# Patient Record
Sex: FEMALE | Race: WHITE | NOT HISPANIC OR LATINO | Employment: FULL TIME | URBAN - METROPOLITAN AREA
[De-identification: names, ages, dates, MRNs, and addresses within clinical notes are randomized per-mention and may not be internally consistent; named-entity substitution may affect disease eponyms.]

---

## 2017-01-13 ENCOUNTER — APPOINTMENT (OUTPATIENT)
Dept: LAB | Facility: HOSPITAL | Age: 52
End: 2017-01-13

## 2017-01-13 ENCOUNTER — CONSULT (OUTPATIENT)
Dept: BARIATRICS/WEIGHT MGMT | Facility: CLINIC | Age: 52
End: 2017-01-13

## 2017-01-13 VITALS
WEIGHT: 263 LBS | SYSTOLIC BLOOD PRESSURE: 132 MMHG | HEIGHT: 65 IN | DIASTOLIC BLOOD PRESSURE: 83 MMHG | TEMPERATURE: 98.3 F | HEART RATE: 69 BPM | RESPIRATION RATE: 16 BRPM | BODY MASS INDEX: 43.82 KG/M2

## 2017-01-13 DIAGNOSIS — E66.01 OBESITY, CLASS III, BMI 40-49.9 (MORBID OBESITY) (HCC): Primary | ICD-10-CM

## 2017-01-13 DIAGNOSIS — G47.33 OSA (OBSTRUCTIVE SLEEP APNEA): ICD-10-CM

## 2017-01-13 DIAGNOSIS — M25.50 MULTIPLE JOINT PAIN: ICD-10-CM

## 2017-01-13 DIAGNOSIS — K21.9 GASTROESOPHAGEAL REFLUX DISEASE, ESOPHAGITIS PRESENCE NOT SPECIFIED: ICD-10-CM

## 2017-01-13 DIAGNOSIS — E78.5 HYPERLIPIDEMIA, UNSPECIFIED HYPERLIPIDEMIA TYPE: ICD-10-CM

## 2017-01-13 DIAGNOSIS — I10 ESSENTIAL HYPERTENSION: ICD-10-CM

## 2017-01-13 DIAGNOSIS — R53.82 CHRONIC FATIGUE: ICD-10-CM

## 2017-01-13 DIAGNOSIS — R60.9 EDEMA, UNSPECIFIED TYPE: ICD-10-CM

## 2017-01-13 DIAGNOSIS — E78.5 DYSLIPIDEMIA: ICD-10-CM

## 2017-01-13 PROBLEM — E66.813 OBESITY, CLASS III, BMI 40-49.9 (MORBID OBESITY): Status: ACTIVE | Noted: 2017-01-13

## 2017-01-13 PROBLEM — M54.9 CHRONIC BACK PAIN: Status: ACTIVE | Noted: 2017-01-13

## 2017-01-13 PROBLEM — J45.909 ASTHMA: Status: ACTIVE | Noted: 2017-01-13

## 2017-01-13 PROBLEM — G89.29 CHRONIC BACK PAIN: Status: ACTIVE | Noted: 2017-01-13

## 2017-01-13 PROBLEM — K58.9 IBS (IRRITABLE BOWEL SYNDROME): Status: ACTIVE | Noted: 2017-01-13

## 2017-01-13 PROBLEM — I50.9 CONGESTIVE HEART FAILURE: Status: ACTIVE | Noted: 2017-01-13

## 2017-01-13 PROBLEM — F32.A DEPRESSION: Status: ACTIVE | Noted: 2017-01-13

## 2017-01-13 PROBLEM — N28.9 RENAL INSUFFICIENCY: Status: ACTIVE | Noted: 2017-01-13

## 2017-01-13 LAB
ALBUMIN SERPL-MCNC: 4.1 G/DL (ref 3.5–5.2)
ALBUMIN/GLOB SERPL: 1.1 G/DL
ALP SERPL-CCNC: 139 U/L (ref 39–117)
ALT SERPL W P-5'-P-CCNC: 27 U/L (ref 1–33)
ANION GAP SERPL CALCULATED.3IONS-SCNC: 13.9 MMOL/L
AST SERPL-CCNC: 20 U/L (ref 1–32)
BASOPHILS # BLD AUTO: 0.03 10*3/MM3 (ref 0–0.2)
BASOPHILS NFR BLD AUTO: 0.3 % (ref 0–1.5)
BILIRUB SERPL-MCNC: 0.4 MG/DL (ref 0.1–1.2)
BUN BLD-MCNC: 12 MG/DL (ref 6–20)
BUN/CREAT SERPL: 10.7 (ref 7–25)
CALCIUM SPEC-SCNC: 9.6 MG/DL (ref 8.6–10.5)
CHLORIDE SERPL-SCNC: 101 MMOL/L (ref 98–107)
CHOLEST SERPL-MCNC: 146 MG/DL (ref 0–200)
CO2 SERPL-SCNC: 28.1 MMOL/L (ref 22–29)
CREAT BLD-MCNC: 1.12 MG/DL (ref 0.57–1)
DEPRECATED RDW RBC AUTO: 50.4 FL (ref 37–54)
EOSINOPHIL # BLD AUTO: 0.3 10*3/MM3 (ref 0–0.7)
EOSINOPHIL NFR BLD AUTO: 3.5 % (ref 0.3–6.2)
ERYTHROCYTE [DISTWIDTH] IN BLOOD BY AUTOMATED COUNT: 16 % (ref 11.7–13)
GFR SERPL CREATININE-BSD FRML MDRD: 51 ML/MIN/1.73
GLOBULIN UR ELPH-MCNC: 3.9 GM/DL
GLUCOSE BLD-MCNC: 114 MG/DL (ref 65–99)
HBA1C MFR BLD: 5.72 % (ref 4.8–5.6)
HCT VFR BLD AUTO: 41.8 % (ref 35.6–45.5)
HDLC SERPL-MCNC: 40 MG/DL (ref 40–60)
HGB BLD-MCNC: 12.9 G/DL (ref 11.9–15.5)
IMM GRANULOCYTES # BLD: 0 10*3/MM3 (ref 0–0.03)
IMM GRANULOCYTES NFR BLD: 0 % (ref 0–0.5)
LDLC SERPL CALC-MCNC: 65 MG/DL (ref 0–100)
LDLC/HDLC SERPL: 1.64 {RATIO}
LYMPHOCYTES # BLD AUTO: 2.37 10*3/MM3 (ref 0.9–4.8)
LYMPHOCYTES NFR BLD AUTO: 27.6 % (ref 19.6–45.3)
MCH RBC QN AUTO: 26.4 PG (ref 26.9–32)
MCHC RBC AUTO-ENTMCNC: 30.9 G/DL (ref 32.4–36.3)
MCV RBC AUTO: 85.7 FL (ref 80.5–98.2)
MONOCYTES # BLD AUTO: 0.37 10*3/MM3 (ref 0.2–1.2)
MONOCYTES NFR BLD AUTO: 4.3 % (ref 5–12)
NEUTROPHILS # BLD AUTO: 5.53 10*3/MM3 (ref 1.9–8.1)
NEUTROPHILS NFR BLD AUTO: 64.3 % (ref 42.7–76)
PLATELET # BLD AUTO: 247 10*3/MM3 (ref 140–500)
PMV BLD AUTO: 10 FL (ref 6–12)
POTASSIUM BLD-SCNC: 3.7 MMOL/L (ref 3.5–5.2)
PROT SERPL-MCNC: 8 G/DL (ref 6–8.5)
RBC # BLD AUTO: 4.88 10*6/MM3 (ref 3.9–5.2)
SODIUM BLD-SCNC: 143 MMOL/L (ref 136–145)
TRIGL SERPL-MCNC: 203 MG/DL (ref 0–150)
TSH SERPL DL<=0.05 MIU/L-ACNC: 2.14 MIU/ML (ref 0.27–4.2)
VLDLC SERPL-MCNC: 40.6 MG/DL (ref 5–40)
WBC NRBC COR # BLD: 8.6 10*3/MM3 (ref 4.5–10.7)

## 2017-01-13 PROCEDURE — 99205 OFFICE O/P NEW HI 60 MIN: CPT | Performed by: NURSE PRACTITIONER

## 2017-01-13 PROCEDURE — 80061 LIPID PANEL: CPT | Performed by: NURSE PRACTITIONER

## 2017-01-13 PROCEDURE — 85025 COMPLETE CBC W/AUTO DIFF WBC: CPT | Performed by: NURSE PRACTITIONER

## 2017-01-13 PROCEDURE — 80053 COMPREHEN METABOLIC PANEL: CPT | Performed by: NURSE PRACTITIONER

## 2017-01-13 PROCEDURE — 84443 ASSAY THYROID STIM HORMONE: CPT | Performed by: NURSE PRACTITIONER

## 2017-01-13 PROCEDURE — 83036 HEMOGLOBIN GLYCOSYLATED A1C: CPT | Performed by: NURSE PRACTITIONER

## 2017-01-13 PROCEDURE — 36415 COLL VENOUS BLD VENIPUNCTURE: CPT | Performed by: NURSE PRACTITIONER

## 2017-01-13 RX ORDER — GABAPENTIN 300 MG/1
300 CAPSULE ORAL 2 TIMES DAILY
COMMUNITY
End: 2019-07-01

## 2017-01-13 NOTE — MR AVS SNAPSHOT
Dangdra Hunt   2017 1:05 PM   Consult    Dept Phone:  [phone]   Encounter #:  [id number]    Provider:  ROSHNI Hanson   Department:  [facility] BARIATRIC SURGERY                Your Full Care Plan              Your Updated Medication List          This list is accurate as of: 17  1:50 PM.  Always use your most recent med list.                albuterol (2.5 MG/3ML) 0.083% nebulizer solution   Commonly known as:  PROVENTIL       atorvastatin 20 MG tablet   Commonly known as:  LIPITOR       estradiol 0.025 MG/24HR patch   Commonly known as:  CLIMARA       FLUoxetine 20 MG capsule   Commonly known as:  PROzac       fluticasone-salmeterol 500-50 MCG/DOSE DISKUS   Commonly known as:  ADVAIR       furosemide 40 MG tablet   Commonly known as:  LASIX       loratadine 10 MG tablet   Commonly known as:  CLARITIN       metoprolol tartrate 25 MG tablet   Commonly known as:  LOPRESSOR       montelukast 10 MG tablet   Commonly known as:  SINGULAIR       omeprazole 40 MG capsule   Commonly known as:  priLOSEC       valsartan 80 MG tablet   Commonly known as:  DIOVAN               Instructions     None    Patient Instructions History      Upcoming Appointments     Visit Type Date Time Department    CONSULT 2017  1:05 PM [facility]    LAB 2017  8:20 AM  [facility]      Fashionchick Signup     [facility] Fashionchick allows you to send messages to your doctor, view your test results, renew your prescriptions, schedule appointments, and more. To sign up, go to [employer] and click on the Sign Up Now link in the New User? box. Enter your Fashionchick Activation Code exactly as it appears below along with the last four digits of your Social Security Number and your Date of Birth () to complete the sign-up process. If you do not sign up before the expiration date, you must request a new code.    Fashionchick Activation Code: [email or web address]  Expires:  "1/27/2017  1:50 PM    If you have questions, you can email [email or web address] or call [phone] to talk to our MyChart staff. Remember, Advanced Patient Carehart is NOT to be used for urgent needs. For medical emergencies, dial 911.               Other Info from Your Visit           Allergies     No Known Allergies      Reason for Visit     Obesity Weight gain; unable to maintain weigh tloss      Vital Signs     Blood Pressure Pulse Temperature Respirations Height Weight    132/83 69 98.3 °F (36.8 °C) (Oral) 16 64.5\" (163.8 cm) 263 lb (119 kg)    Body Mass Index Smoking Status                44.45 kg/m2 Former Smoker            "

## 2017-01-13 NOTE — PATIENT INSTRUCTIONS
Recommended patient be sure to get at least 70 grams of protein per day by eating small, frequent meals all with high lean protein choices. Be sure to limit/cut back on daily carbohydrate intake. Discussed with the patient the recommended amount of water per day to intake. Reviewed vitamin requirements. Be sure to do routine exercise including both cardio and strength training.

## 2017-01-13 NOTE — PROGRESS NOTES
[facility] BARIATRIC SURGERY  [address]  [facility] [address]  [address]  [facility] [address]  Dept: [phone]  1/13/2017      Dang Hunt.  [id number]  [id number]  1965  female      Chief Complaint of weight gain; unable to maintain weight loss    History of Present Illness:   Dang is a 51 y.o. female who presents today for evaluation, education and consultation regarding weight loss surgery. The patient is interested in the sleeve gastrectomy.      Diet History:Dang has been overweight for at least 20 years, has been 35 pounds or more overweight for at least 20 years, has been 100 pounds or more overweight for 10 or more years and started dieting at age 31.  The most weight Dang lost was 30 pounds on fasting and maintained the weight loss for 6 months. Dang describes her eating habits as snacker. Dang Hunt has tried Atkins, Weight Watchers, Fasting, reduced calorie and prescription medications among others with success of losing up to 30 pounds, but in each instance regained the weight.     See dietician documentation for complete history.    Bariatric Surgery Evaluation: The patient is being seen for an initial visit for bariatric surgery evaluation.     Bariatric Co-morbidities:  sleep apnea, hypertension, dyslipidemia, back pain, GERD, asthma, edema and depression    Patient Active Problem List   Diagnosis   • Degeneration of intervertebral disc of lumbar region   • Chronic back pain   • Obesity, Class III, BMI 40-49.9 (morbid obesity)   • MANI (obstructive sleep apnea)   • GERD (gastroesophageal reflux disease)   • Hyperlipidemia   • Essential hypertension   • Chronic fatigue   • Edema   • Asthma   • IBS (irritable bowel syndrome)   • Renal insufficiency   • Multiple joint pain   • Depression   • Congestive heart failure       Past Medical History   Diagnosis Date   • Acute pancreatitis    • Colitis    • Depression     • Diarrhea    • Dysphagia    • Edema    • Fatigue    • GERD (gastroesophageal reflux disease)    • Heartburn    • HTN (hypertension)    • Hyperlipidemia    • Multiple joint pain    • MANI (obstructive sleep apnea)    • RLS (restless legs syndrome)        Past Surgical History   Procedure Laterality Date   • Hysterectomy     • Colonoscopy     • Back surgery     • Lung biopsy     • Laparoscopic cholecystectomy     • Oophorectomy     • Breast biopsy     • Neck surgery     • Bladder suspension         No Known Allergies      Current Outpatient Prescriptions:   •  albuterol (PROVENTIL) (2.5 MG/3ML) 0.083% nebulizer solution, Take 2.5 mg by nebulization Every 4 (Four) Hours As Needed for wheezing., Disp: , Rfl:   •  atorvastatin (LIPITOR) 20 MG tablet, Take 20 mg by mouth Daily., Disp: , Rfl:   •  estradiol (CLIMARA) 0.025 MG/24HR patch, Place 1 patch on the skin 1 (One) Time Per Week., Disp: , Rfl:   •  FLUoxetine (PROzac) 20 MG capsule, Take 20 mg by mouth Daily., Disp: , Rfl:   •  fluticasone-salmeterol (ADVAIR) 500-50 MCG/DOSE DISKUS, Inhale 2 (Two) Times a Day., Disp: , Rfl:   •  furosemide (LASIX) 40 MG tablet, Take 40 mg by mouth 2 (Two) Times a Day., Disp: , Rfl:   •  gabapentin (NEURONTIN) 300 MG capsule, Take 300 mg by mouth 2 (Two) Times a Day., Disp: , Rfl:   •  loratadine (CLARITIN) 10 MG tablet, Take 10 mg by mouth Daily., Disp: , Rfl:   •  metoprolol tartrate (LOPRESSOR) 25 MG tablet, Take 25 mg by mouth 2 (Two) Times a Day., Disp: , Rfl:   •  montelukast (SINGULAIR) 10 MG tablet, Take 10 mg by mouth Every Night., Disp: , Rfl:   •  Omega-3 Fatty Acids (FISH OIL BURP-LESS PO), Take 1 tablet by mouth Daily., Disp: , Rfl:   •  omeprazole (priLOSEC) 40 MG capsule, Take 40 mg by mouth Daily., Disp: , Rfl:   •  valsartan (DIOVAN) 80 MG tablet, Take 80 mg by mouth Daily., Disp: , Rfl:     Social History     Social History   • Marital status:      Spouse name: N/A   • Number of children: 2   • Years of  education: N/A     Occupational History   • Not on file.     Social History Main Topics   • Smoking status: Former Smoker   • Smokeless tobacco: Not on file   • Alcohol use No   • Drug use: No   • Sexual activity: Defer     Other Topics Concern   • Not on file     Social History Narrative       Family History   Problem Relation Age of Onset   • Obesity Mother    • Hypertension Mother    • Obesity Sister    • Hypertension Sister    • Obesity Maternal Grandmother    • Hypertension Maternal Grandmother    • Stroke Maternal Grandmother    • Heart attack Maternal Grandmother          Review of Systems:  Review of Systems   All other systems reviewed and are negative.      Physical Exam:  Vital Signs:  Weight: 263 lb (119 kg)   Body mass index is 44.45 kg/(m^2).  Temp: 98.3 °F (36.8 °C)   Heart Rate: 69   BP: 132/83     Physical Exam   Constitutional: She is oriented to person, place, and time. She appears well-developed and well-nourished.   HENT:   Head: Normocephalic and atraumatic.   Eyes: EOM are normal.   Cardiovascular: Normal rate, regular rhythm and normal heart sounds.    Pulmonary/Chest: Effort normal and breath sounds normal.   Abdominal: Soft. Bowel sounds are normal. She exhibits no distension. There is no tenderness.   Musculoskeletal: Normal range of motion.   Neurological: She is alert and oriented to person, place, and time.   Skin: Skin is warm and dry.   Psychiatric: She has a normal mood and affect. Her behavior is normal. Judgment and thought content normal.   Vitals reviewed.         Assessment:         Dang Hunt is a 51 y.o. year old female with medically complicated severe obesity. Weight: 263 lb (119 kg), Body mass index is 44.45 kg/(m^2). and weight related problems including sleep apnea, hypertension, dyslipidemia, back pain, GERD, asthma, edema and depression.    I explained in detail the procedures that we are performing.  All of those procedures can be performed laparoscopically but there  is a chance to convert to open if any technical challenges or complications do occur.  Bariatric surgery is not cosmetic surgery but rather a tool to help a patient make a life-long commitment lifestyle changes including diet, exercise, behavior changes, and taking supplemental vitamins and minerals.    Due to the patient's BMI and co-morbidities they are at a high risk for surgery and will obtain the following:  The patient has been advised that a letter of medical support and a history and physical must be obtained from her primary care physician. A psychological evaluation will be arranged for this patient. CBC, CMP, FLP, TSH and HgbA1C will be drawn. Dang Hunt will obtain a pre-operative CXR and EKG. Dang Andersonel will obtain clearance from her cardiologist and pulmonologist prior to surgery.     Dang Hunt will be set up for a pre-operative diagnostic esophagogastroduodenoscopy with biopsy for evaluation. The risks and benefits of the procedure were discussed with the patient in detail and all questions were answered.  Possibility of perforation, bleeding, aspiration, anoxic brain injury, respiratory and/or cardiac arrest and death were discussed.   She received handouts regarding, all questions were answered and informed consent was obtained.     The risks, benefits, alternatives, and potential complications of all of the procedures were explained in detail including, but not limited to death, anesthesia and medication adverse effect/DVT, pulmonary embolism, trocar site/incisional hernia, wound infection, abdominal infection, bleeding, failure to lose weight or gain weight and change in body image, metabolic complications with calcium, thiamine, vitamin B12, folate, iron, and anemia.    The patient was advised to start a high protein, low fat and low carbohydrate diet. The patient was given individualized information by our dietician along with general group information and handouts.     If the patient had  the Basal Metabolic Rate test performed in our office today then I reviewed the results with them including changes to help increase metabolism and a recommended daily caloric intake range- see scanned results.    The patient was given information regarding the ANI educational video. ANI is an internet based educational video which explains the surgical procedure and answers basic questions regarding the procedure. The patient was provided with instructions and a password to watch the video.    The patient was encouraged to start routine exercise including but not limited to 150 minutes per week. The patient received a resistance band along with a handout of exercises.     The consultation plan was reviewed with the patient.    The patient understands the surgical procedures and the different surgical options that are available.  She understands the lifestyle changes that would be required after surgery and has agreed to participate in a pre-operative and postoperative weight management program.  She also expressed understanding of possible risks, had several questions answered and desires to proceed.    I think she is a good candidate for this surgery, and is interested in a sleeve gastrectomy.    Plan:    Patient will have evaluations and follow up with bariatric dieticians and a psychologist before undergoing a multidisciplinary review of her candidacy.  We also discussed the weight loss requirement and rationale, and other program requirements.      Radha Douglas, APRN  1/13/2017

## 2017-01-13 NOTE — PROGRESS NOTES
"Bariatric Nutrition Counseling Interview    Patient Name:  Dang Hunt  YOB: 1965  Age:  51 y.o.  Sex:  female  MRN: [id number]  Date:  01/13/17    Procedure Considering:  Sleeve    Last Documented Height:    Ht Readings from Last 1 Encounters:   01/13/17 64.5\" (163.8 cm)     Last Documented Weight:   Wt Readings from Last 1 Encounters:   01/13/17 263 lb (119 kg)      Body mass index is 44.45 kg/(m^2).    Highest Weight:  263 lbs.  Goal Weight: 160 lbs.    History:  Past Medical History   Diagnosis Date   • Depression    • Diarrhea    • Dysphagia    • Edema    • Fatigue    • GERD (gastroesophageal reflux disease)    • Heartburn    • HTN (hypertension)    • Hyperlipidemia    • Multiple joint pain    • MANI (obstructive sleep apnea)    • Pancreatic disease    • RLS (restless legs syndrome)      Past Surgical History   Procedure Laterality Date   • Cholecystectomy     • Hysterectomy     • Colonoscopy     • Back surgery     • Lung biopsy       Family History   Problem Relation Age of Onset   • Obesity Mother    • Hypertension Mother    • Obesity Sister    • Hypertension Sister    • Obesity Maternal Grandmother    • Hypertension Maternal Grandmother    • Stroke Maternal Grandmother    • Heart attack Maternal Grandmother      Social History     Social History   • Marital status:      Spouse name: N/A   • Number of children: N/A   • Years of education: N/A     Social History Main Topics   • Smoking status: Former Smoker   • Smokeless tobacco: Not on file   • Alcohol use No   • Drug use: Not on file   • Sexual activity: Not on file     Other Topics Concern   • Not on file     Social History Narrative   • No narrative on file     Additional Health Issues to Consider:  CHF,COPD,HTN,Migraines,Joint pain    Weight History:  Dang has struggled with weight over the past twenty years. She attributes this to a sedentary lifestyle and poor eating habits. She complains of multiple health issues that have " impacted her weight gain.            Previous Weight Loss Efforts:  Weight Watchers, The Roland diet, Over the counter weight loss aids  Most Successful Weight Loss Effort:  Weight loss efforts have not been helpful in reaching long term weight loss goals    Eating Habits: Eat large portions, snacking  Eat three meals on most days?  Yes  Worst eating habit?  Snacker    How often do you eat fast food? weekly    Do you exercise regularly? (at least 3 times each week)  No    Occupation:  Adult Foster care worker,some  Physical activity required on a regular basis    Personal Goal After Procedure:  To reduce medications and move without Shortness of breath. Dang would like to walk for one one mile regularly.   Personal Support:  Sister, also considering weight loss surgery.       Assessment: We reviewed program requirements for weight loss following surgery.  We discussed personal habits and lifestyle behaviors that may influence weight loss efforts. Program , including a reduced calorie diet,were provided, discussed and recommended as the regimen to follow for pre and post diet planning. Dang demonstrated a good comprehension of diet requirements as well as a commitment to work on personal challenges.  She will make a good candidate for weight loss surgery    Electronically signed by:  Harriett Le RD  01/13/17 1:12 PM

## 2017-03-07 ENCOUNTER — OUTSIDE FACILITY SERVICE (OUTPATIENT)
Dept: BARIATRICS/WEIGHT MGMT | Facility: CLINIC | Age: 52
End: 2017-03-07

## 2017-03-07 PROCEDURE — 43239 EGD BIOPSY SINGLE/MULTIPLE: CPT | Performed by: SURGERY

## 2017-03-08 ENCOUNTER — LAB REQUISITION (OUTPATIENT)
Dept: LAB | Facility: HOSPITAL | Age: 52
End: 2017-03-08

## 2017-03-08 DIAGNOSIS — K21.9 GASTRO-ESOPHAGEAL REFLUX DISEASE WITHOUT ESOPHAGITIS: ICD-10-CM

## 2017-03-08 PROCEDURE — 87081 CULTURE SCREEN ONLY: CPT | Performed by: SURGERY

## 2017-03-23 LAB — UREASE TISS QL: NEGATIVE

## 2017-04-30 ENCOUNTER — PREP FOR SURGERY (OUTPATIENT)
Dept: BARIATRICS/WEIGHT MGMT | Facility: CLINIC | Age: 52
End: 2017-04-30

## 2017-04-30 DIAGNOSIS — E66.01 OBESITY, CLASS III, BMI 40-49.9 (MORBID OBESITY) (HCC): Primary | ICD-10-CM

## 2017-04-30 RX ORDER — SODIUM CHLORIDE 0.9 % (FLUSH) 0.9 %
1-10 SYRINGE (ML) INJECTION AS NEEDED
Status: CANCELLED | OUTPATIENT
Start: 2017-04-30

## 2017-04-30 RX ORDER — CHLORHEXIDINE GLUCONATE 0.12 MG/ML
15 RINSE ORAL SEE ADMIN INSTRUCTIONS
Status: CANCELLED | OUTPATIENT
Start: 2017-04-30

## 2017-04-30 RX ORDER — CEFAZOLIN SODIUM IN 0.9 % NACL 3 G/100 ML
3 INTRAVENOUS SOLUTION, PIGGYBACK (ML) INTRAVENOUS ONCE
Status: CANCELLED | OUTPATIENT
Start: 2017-04-30 | End: 2017-04-30

## 2017-04-30 RX ORDER — PANTOPRAZOLE SODIUM 40 MG/10ML
40 INJECTION, POWDER, LYOPHILIZED, FOR SOLUTION INTRAVENOUS ONCE
Status: CANCELLED | OUTPATIENT
Start: 2017-04-30 | End: 2017-04-30

## 2017-04-30 RX ORDER — SODIUM CHLORIDE, SODIUM LACTATE, POTASSIUM CHLORIDE, CALCIUM CHLORIDE 600; 310; 30; 20 MG/100ML; MG/100ML; MG/100ML; MG/100ML
100 INJECTION, SOLUTION INTRAVENOUS CONTINUOUS
Status: CANCELLED | OUTPATIENT
Start: 2017-04-30

## 2017-04-30 RX ORDER — SCOLOPAMINE TRANSDERMAL SYSTEM 1 MG/1
1 PATCH, EXTENDED RELEASE TRANSDERMAL ONCE
Status: CANCELLED | OUTPATIENT
Start: 2017-04-30 | End: 2017-04-30

## 2017-05-04 ENCOUNTER — CONSULT (OUTPATIENT)
Dept: BARIATRICS/WEIGHT MGMT | Facility: CLINIC | Age: 52
End: 2017-05-04

## 2017-05-04 VITALS
SYSTOLIC BLOOD PRESSURE: 105 MMHG | HEART RATE: 68 BPM | HEIGHT: 65 IN | TEMPERATURE: 98.2 F | WEIGHT: 268.5 LBS | DIASTOLIC BLOOD PRESSURE: 73 MMHG | RESPIRATION RATE: 16 BRPM | BODY MASS INDEX: 44.73 KG/M2

## 2017-05-04 DIAGNOSIS — M25.50 MULTIPLE JOINT PAIN: ICD-10-CM

## 2017-05-04 DIAGNOSIS — E66.01 OBESITY, CLASS III, BMI 40-49.9 (MORBID OBESITY) (HCC): Primary | ICD-10-CM

## 2017-05-04 DIAGNOSIS — G89.29 CHRONIC LOW BACK PAIN, UNSPECIFIED BACK PAIN LATERALITY, WITH SCIATICA PRESENCE UNSPECIFIED: ICD-10-CM

## 2017-05-04 DIAGNOSIS — I50.30 DIASTOLIC CONGESTIVE HEART FAILURE, UNSPECIFIED CONGESTIVE HEART FAILURE CHRONICITY: ICD-10-CM

## 2017-05-04 DIAGNOSIS — K21.9 GASTROESOPHAGEAL REFLUX DISEASE WITHOUT ESOPHAGITIS: ICD-10-CM

## 2017-05-04 DIAGNOSIS — M51.36 DEGENERATION OF INTERVERTEBRAL DISC OF LUMBAR REGION: ICD-10-CM

## 2017-05-04 DIAGNOSIS — I10 ESSENTIAL HYPERTENSION: ICD-10-CM

## 2017-05-04 DIAGNOSIS — R53.82 CHRONIC FATIGUE: ICD-10-CM

## 2017-05-04 DIAGNOSIS — E78.5 HYPERLIPIDEMIA, UNSPECIFIED HYPERLIPIDEMIA TYPE: ICD-10-CM

## 2017-05-04 DIAGNOSIS — J45.20 MILD INTERMITTENT ASTHMA WITHOUT COMPLICATION: ICD-10-CM

## 2017-05-04 DIAGNOSIS — M54.5 CHRONIC LOW BACK PAIN, UNSPECIFIED BACK PAIN LATERALITY, WITH SCIATICA PRESENCE UNSPECIFIED: ICD-10-CM

## 2017-05-04 DIAGNOSIS — N28.9 RENAL INSUFFICIENCY: ICD-10-CM

## 2017-05-04 DIAGNOSIS — G47.33 OSA (OBSTRUCTIVE SLEEP APNEA): ICD-10-CM

## 2017-05-04 PROCEDURE — 99215 OFFICE O/P EST HI 40 MIN: CPT | Performed by: SURGERY

## 2017-05-09 ENCOUNTER — APPOINTMENT (OUTPATIENT)
Dept: PREADMISSION TESTING | Facility: HOSPITAL | Age: 52
End: 2017-05-09

## 2017-05-09 VITALS
TEMPERATURE: 97.4 F | HEART RATE: 76 BPM | SYSTOLIC BLOOD PRESSURE: 105 MMHG | DIASTOLIC BLOOD PRESSURE: 72 MMHG | HEIGHT: 65 IN | RESPIRATION RATE: 20 BRPM | OXYGEN SATURATION: 94 %

## 2017-05-09 DIAGNOSIS — E66.01 OBESITY, CLASS III, BMI 40-49.9 (MORBID OBESITY) (HCC): ICD-10-CM

## 2017-05-09 LAB
ALBUMIN SERPL-MCNC: 4 G/DL (ref 3.5–5.2)
ALBUMIN/GLOB SERPL: 1.1 G/DL
ALP SERPL-CCNC: 111 U/L (ref 39–117)
ALT SERPL W P-5'-P-CCNC: 27 U/L (ref 1–33)
ANION GAP SERPL CALCULATED.3IONS-SCNC: 13.4 MMOL/L
AST SERPL-CCNC: 22 U/L (ref 1–32)
BILIRUB SERPL-MCNC: 0.4 MG/DL (ref 0.1–1.2)
BUN BLD-MCNC: 31 MG/DL (ref 6–20)
BUN/CREAT SERPL: 32.6 (ref 7–25)
CALCIUM SPEC-SCNC: 9.8 MG/DL (ref 8.6–10.5)
CHLORIDE SERPL-SCNC: 100 MMOL/L (ref 98–107)
CO2 SERPL-SCNC: 25.6 MMOL/L (ref 22–29)
CREAT BLD-MCNC: 0.95 MG/DL (ref 0.57–1)
DEPRECATED RDW RBC AUTO: 49.8 FL (ref 37–54)
ERYTHROCYTE [DISTWIDTH] IN BLOOD BY AUTOMATED COUNT: 16 % (ref 11.7–13)
GFR SERPL CREATININE-BSD FRML MDRD: 62 ML/MIN/1.73
GLOBULIN UR ELPH-MCNC: 3.8 GM/DL
GLUCOSE BLD-MCNC: 82 MG/DL (ref 65–99)
HCT VFR BLD AUTO: 41.7 % (ref 35.6–45.5)
HGB BLD-MCNC: 13.1 G/DL (ref 11.9–15.5)
MCH RBC QN AUTO: 27 PG (ref 26.9–32)
MCHC RBC AUTO-ENTMCNC: 31.4 G/DL (ref 32.4–36.3)
MCV RBC AUTO: 85.8 FL (ref 80.5–98.2)
PLATELET # BLD AUTO: 248 10*3/MM3 (ref 140–500)
PMV BLD AUTO: 10.1 FL (ref 6–12)
POTASSIUM BLD-SCNC: 4.2 MMOL/L (ref 3.5–5.2)
PROT SERPL-MCNC: 7.8 G/DL (ref 6–8.5)
RBC # BLD AUTO: 4.86 10*6/MM3 (ref 3.9–5.2)
SODIUM BLD-SCNC: 139 MMOL/L (ref 136–145)
WBC NRBC COR # BLD: 8.73 10*3/MM3 (ref 4.5–10.7)

## 2017-05-09 PROCEDURE — 36415 COLL VENOUS BLD VENIPUNCTURE: CPT

## 2017-05-09 PROCEDURE — 80053 COMPREHEN METABOLIC PANEL: CPT | Performed by: SURGERY

## 2017-05-09 PROCEDURE — 85027 COMPLETE CBC AUTOMATED: CPT | Performed by: SURGERY

## 2017-05-15 ENCOUNTER — HOSPITAL ENCOUNTER (INPATIENT)
Facility: HOSPITAL | Age: 52
LOS: 1 days | Discharge: HOME | End: 2017-05-16
Attending: SURGERY | Admitting: SURGERY

## 2017-05-15 ENCOUNTER — ANESTHESIA EVENT (OUTPATIENT)
Dept: PERIOP | Facility: HOSPITAL | Age: 52
End: 2017-05-15

## 2017-05-15 ENCOUNTER — ANESTHESIA (OUTPATIENT)
Dept: PERIOP | Facility: HOSPITAL | Age: 52
End: 2017-05-15

## 2017-05-15 DIAGNOSIS — E66.01 OBESITY, CLASS III, BMI 40-49.9 (MORBID OBESITY) (HCC): ICD-10-CM

## 2017-05-15 PROCEDURE — 25010000003 CEFAZOLIN IN DEXTROSE 2-4 GM/100ML-% SOLUTION: Performed by: SURGERY

## 2017-05-15 PROCEDURE — 25010000002 ONDANSETRON PER 1 MG: Performed by: NURSE ANESTHETIST, CERTIFIED REGISTERED

## 2017-05-15 PROCEDURE — 94799 UNLISTED PULMONARY SVC/PX: CPT

## 2017-05-15 PROCEDURE — 25010000002 KETOROLAC TROMETHAMINE PER 15 MG: Performed by: SURGERY

## 2017-05-15 PROCEDURE — 25010000002 SUCCINYLCHOLINE PER 20 MG: Performed by: NURSE ANESTHETIST, CERTIFIED REGISTERED

## 2017-05-15 PROCEDURE — 43775 LAP SLEEVE GASTRECTOMY: CPT | Performed by: SURGERY

## 2017-05-15 PROCEDURE — 25010000002 METOCLOPRAMIDE PER 10 MG: Performed by: SURGERY

## 2017-05-15 PROCEDURE — 25010000002 FENTANYL CITRATE (PF) 100 MCG/2ML SOLUTION: Performed by: NURSE ANESTHETIST, CERTIFIED REGISTERED

## 2017-05-15 PROCEDURE — 0DB64Z3 EXCISION OF STOMACH, PERCUTANEOUS ENDOSCOPIC APPROACH, VERTICAL: ICD-10-PCS | Performed by: SURGERY

## 2017-05-15 PROCEDURE — 25010000002 ENOXAPARIN PER 10 MG: Performed by: SURGERY

## 2017-05-15 PROCEDURE — 25010000002 NEOSTIGMINE 10 MG/10ML SOLUTION: Performed by: NURSE ANESTHETIST, CERTIFIED REGISTERED

## 2017-05-15 PROCEDURE — 43775 LAP SLEEVE GASTRECTOMY: CPT | Performed by: NURSE PRACTITIONER

## 2017-05-15 PROCEDURE — 25010000002 KETOROLAC TROMETHAMINE PER 15 MG: Performed by: NURSE ANESTHETIST, CERTIFIED REGISTERED

## 2017-05-15 PROCEDURE — 25010000002 HYDROMORPHONE PER 4 MG: Performed by: SURGERY

## 2017-05-15 PROCEDURE — 25010000002 MIDAZOLAM PER 1 MG: Performed by: ANESTHESIOLOGY

## 2017-05-15 PROCEDURE — 25010000002 HYDROMORPHONE PER 4 MG: Performed by: NURSE ANESTHETIST, CERTIFIED REGISTERED

## 2017-05-15 PROCEDURE — 25010000002 DEXAMETHASONE PER 1 MG: Performed by: NURSE ANESTHETIST, CERTIFIED REGISTERED

## 2017-05-15 PROCEDURE — 25010000002 PROPOFOL 10 MG/ML EMULSION: Performed by: NURSE ANESTHETIST, CERTIFIED REGISTERED

## 2017-05-15 PROCEDURE — C1763 CONN TISS, NON-HUMAN: HCPCS | Performed by: SURGERY

## 2017-05-15 PROCEDURE — 94640 AIRWAY INHALATION TREATMENT: CPT

## 2017-05-15 DEVICE — SEALANT FIBRIN TISSEEL FZ 4ML: Type: IMPLANTABLE DEVICE | Site: STOMACH | Status: FUNCTIONAL

## 2017-05-15 DEVICE — PERI-STRIPS DRY WITH VERITAS COLLAGEN MATRIX (PSD-V) IS PREPARED FROM DEHYDRATED BOVINE PERICARDIUM PROCURED FROM CATTLE UNDER 30 MONTHS OF AGE IN THE UNITED STATES. ONE (1) TUBE OF PSD GEL (GEL) IS PROVIDED FOR EVERY TWO (2) POUCHES OF PSD-V. THE GEL IS USED TO CREATE A TEMPORARY BOND BETWEEN THE PSD-V BUTTRESS AND THE SURGICAL STAPLER JAWS UNTIL THE STAPLER IS POSITIONED AND FIRED.
Type: IMPLANTABLE DEVICE | Site: STOMACH | Status: FUNCTIONAL
Brand: PERI-STRIPS DRY WITH VERITAS COLLAGEN MATRIX

## 2017-05-15 RX ORDER — LABETALOL HYDROCHLORIDE 5 MG/ML
10 INJECTION, SOLUTION INTRAVENOUS
Status: DISCONTINUED | OUTPATIENT
Start: 2017-05-15 | End: 2017-05-16 | Stop reason: HOSPADM

## 2017-05-15 RX ORDER — FENTANYL CITRATE 50 UG/ML
INJECTION, SOLUTION INTRAMUSCULAR; INTRAVENOUS
Status: DISPENSED
Start: 2017-05-15 | End: 2017-05-16

## 2017-05-15 RX ORDER — KETOROLAC TROMETHAMINE 30 MG/ML
30 INJECTION, SOLUTION INTRAMUSCULAR; INTRAVENOUS 4 TIMES DAILY
Status: COMPLETED | OUTPATIENT
Start: 2017-05-15 | End: 2017-05-16

## 2017-05-15 RX ORDER — PANTOPRAZOLE SODIUM 40 MG/10ML
40 INJECTION, POWDER, LYOPHILIZED, FOR SOLUTION INTRAVENOUS
Status: DISCONTINUED | OUTPATIENT
Start: 2017-05-16 | End: 2017-05-16 | Stop reason: HOSPADM

## 2017-05-15 RX ORDER — NEOSTIGMINE METHYLSULFATE 1 MG/ML
INJECTION, SOLUTION INTRAVENOUS AS NEEDED
Status: DISCONTINUED | OUTPATIENT
Start: 2017-05-15 | End: 2017-05-15 | Stop reason: SURG

## 2017-05-15 RX ORDER — FENTANYL CITRATE 50 UG/ML
50 INJECTION, SOLUTION INTRAMUSCULAR; INTRAVENOUS
Status: DISCONTINUED | OUTPATIENT
Start: 2017-05-15 | End: 2017-05-15 | Stop reason: HOSPADM

## 2017-05-15 RX ORDER — PROMETHAZINE HYDROCHLORIDE 25 MG/ML
12.5 INJECTION, SOLUTION INTRAMUSCULAR; INTRAVENOUS ONCE AS NEEDED
Status: DISCONTINUED | OUTPATIENT
Start: 2017-05-15 | End: 2017-05-15 | Stop reason: HOSPADM

## 2017-05-15 RX ORDER — DEXAMETHASONE SODIUM PHOSPHATE 10 MG/ML
INJECTION INTRAMUSCULAR; INTRAVENOUS AS NEEDED
Status: DISCONTINUED | OUTPATIENT
Start: 2017-05-15 | End: 2017-05-15 | Stop reason: SURG

## 2017-05-15 RX ORDER — ONDANSETRON 2 MG/ML
INJECTION INTRAMUSCULAR; INTRAVENOUS
Status: DISPENSED
Start: 2017-05-15 | End: 2017-05-16

## 2017-05-15 RX ORDER — SODIUM CHLORIDE 9 MG/ML
INJECTION, SOLUTION INTRAVENOUS AS NEEDED
Status: DISCONTINUED | OUTPATIENT
Start: 2017-05-15 | End: 2017-05-15 | Stop reason: HOSPADM

## 2017-05-15 RX ORDER — MIDAZOLAM HYDROCHLORIDE 1 MG/ML
2 INJECTION INTRAMUSCULAR; INTRAVENOUS
Status: DISCONTINUED | OUTPATIENT
Start: 2017-05-15 | End: 2017-05-15 | Stop reason: HOSPADM

## 2017-05-15 RX ORDER — SODIUM CHLORIDE 0.9 % (FLUSH) 0.9 %
1-10 SYRINGE (ML) INJECTION AS NEEDED
Status: DISCONTINUED | OUTPATIENT
Start: 2017-05-15 | End: 2017-05-15 | Stop reason: HOSPADM

## 2017-05-15 RX ORDER — ACETAMINOPHEN 160 MG/5ML
650 SOLUTION ORAL EVERY 4 HOURS PRN
Status: DISCONTINUED | OUTPATIENT
Start: 2017-05-15 | End: 2017-05-16 | Stop reason: HOSPADM

## 2017-05-15 RX ORDER — PROMETHAZINE HYDROCHLORIDE 25 MG/1
25 SUPPOSITORY RECTAL ONCE AS NEEDED
Status: DISCONTINUED | OUTPATIENT
Start: 2017-05-15 | End: 2017-05-15 | Stop reason: HOSPADM

## 2017-05-15 RX ORDER — LABETALOL HYDROCHLORIDE 5 MG/ML
5 INJECTION, SOLUTION INTRAVENOUS
Status: DISCONTINUED | OUTPATIENT
Start: 2017-05-15 | End: 2017-05-15 | Stop reason: HOSPADM

## 2017-05-15 RX ORDER — CEFAZOLIN SODIUM IN 0.9 % NACL 3 G/100 ML
3 INTRAVENOUS SOLUTION, PIGGYBACK (ML) INTRAVENOUS ONCE
Status: COMPLETED | OUTPATIENT
Start: 2017-05-15 | End: 2017-05-15

## 2017-05-15 RX ORDER — BUPIVACAINE HYDROCHLORIDE AND EPINEPHRINE 5; 5 MG/ML; UG/ML
INJECTION, SOLUTION PERINEURAL AS NEEDED
Status: DISCONTINUED | OUTPATIENT
Start: 2017-05-15 | End: 2017-05-15 | Stop reason: HOSPADM

## 2017-05-15 RX ORDER — ALBUTEROL SULFATE 2.5 MG/3ML
2.5 SOLUTION RESPIRATORY (INHALATION)
Status: DISCONTINUED | OUTPATIENT
Start: 2017-05-15 | End: 2017-05-16 | Stop reason: HOSPADM

## 2017-05-15 RX ORDER — MORPHINE SULFATE 2 MG/ML
2 INJECTION, SOLUTION INTRAMUSCULAR; INTRAVENOUS
Status: DISCONTINUED | OUTPATIENT
Start: 2017-05-15 | End: 2017-05-16 | Stop reason: HOSPADM

## 2017-05-15 RX ORDER — ACETAMINOPHEN 10 MG/ML
INJECTION, SOLUTION INTRAVENOUS AS NEEDED
Status: DISCONTINUED | OUTPATIENT
Start: 2017-05-15 | End: 2017-05-15 | Stop reason: SURG

## 2017-05-15 RX ORDER — ONDANSETRON 2 MG/ML
INJECTION INTRAMUSCULAR; INTRAVENOUS AS NEEDED
Status: DISCONTINUED | OUTPATIENT
Start: 2017-05-15 | End: 2017-05-15 | Stop reason: SURG

## 2017-05-15 RX ORDER — MAGNESIUM HYDROXIDE 1200 MG/15ML
LIQUID ORAL AS NEEDED
Status: DISCONTINUED | OUTPATIENT
Start: 2017-05-15 | End: 2017-05-15 | Stop reason: HOSPADM

## 2017-05-15 RX ORDER — PANTOPRAZOLE SODIUM 40 MG/10ML
40 INJECTION, POWDER, LYOPHILIZED, FOR SOLUTION INTRAVENOUS ONCE
Status: COMPLETED | OUTPATIENT
Start: 2017-05-15 | End: 2017-05-15

## 2017-05-15 RX ORDER — FENTANYL CITRATE 50 UG/ML
INJECTION, SOLUTION INTRAMUSCULAR; INTRAVENOUS AS NEEDED
Status: DISCONTINUED | OUTPATIENT
Start: 2017-05-15 | End: 2017-05-15 | Stop reason: SURG

## 2017-05-15 RX ORDER — NALOXONE HCL 0.4 MG/ML
0.2 VIAL (ML) INJECTION AS NEEDED
Status: DISCONTINUED | OUTPATIENT
Start: 2017-05-15 | End: 2017-05-15 | Stop reason: HOSPADM

## 2017-05-15 RX ORDER — ONDANSETRON 2 MG/ML
4 INJECTION INTRAMUSCULAR; INTRAVENOUS EVERY 6 HOURS PRN
Status: DISCONTINUED | OUTPATIENT
Start: 2017-05-15 | End: 2017-05-16 | Stop reason: HOSPADM

## 2017-05-15 RX ORDER — OXYCODONE AND ACETAMINOPHEN 7.5; 325 MG/1; MG/1
1 TABLET ORAL ONCE AS NEEDED
Status: DISCONTINUED | OUTPATIENT
Start: 2017-05-15 | End: 2017-05-15 | Stop reason: HOSPADM

## 2017-05-15 RX ORDER — CYANOCOBALAMIN 1000 UG/ML
1000 INJECTION, SOLUTION INTRAMUSCULAR; SUBCUTANEOUS ONCE
Status: COMPLETED | OUTPATIENT
Start: 2017-05-16 | End: 2017-05-16

## 2017-05-15 RX ORDER — ONDANSETRON 2 MG/ML
4 INJECTION INTRAMUSCULAR; INTRAVENOUS ONCE AS NEEDED
Status: COMPLETED | OUTPATIENT
Start: 2017-05-15 | End: 2017-05-15

## 2017-05-15 RX ORDER — PROMETHAZINE HYDROCHLORIDE 25 MG/ML
12.5 INJECTION, SOLUTION INTRAMUSCULAR; INTRAVENOUS EVERY 4 HOURS PRN
Status: DISCONTINUED | OUTPATIENT
Start: 2017-05-15 | End: 2017-05-16 | Stop reason: HOSPADM

## 2017-05-15 RX ORDER — CHLORHEXIDINE GLUCONATE 0.12 MG/ML
15 RINSE ORAL SEE ADMIN INSTRUCTIONS
Status: COMPLETED | OUTPATIENT
Start: 2017-05-15 | End: 2017-05-15

## 2017-05-15 RX ORDER — NALOXONE HCL 0.4 MG/ML
0.1 VIAL (ML) INJECTION
Status: DISCONTINUED | OUTPATIENT
Start: 2017-05-15 | End: 2017-05-16 | Stop reason: HOSPADM

## 2017-05-15 RX ORDER — LORAZEPAM 2 MG/ML
1 INJECTION INTRAMUSCULAR EVERY 12 HOURS PRN
Status: DISCONTINUED | OUTPATIENT
Start: 2017-05-15 | End: 2017-05-16 | Stop reason: HOSPADM

## 2017-05-15 RX ORDER — NALOXONE HCL 0.4 MG/ML
0.4 VIAL (ML) INJECTION
Status: DISCONTINUED | OUTPATIENT
Start: 2017-05-15 | End: 2017-05-16 | Stop reason: HOSPADM

## 2017-05-15 RX ORDER — ONDANSETRON 4 MG/1
4 TABLET, ORALLY DISINTEGRATING ORAL EVERY 6 HOURS PRN
Status: DISCONTINUED | OUTPATIENT
Start: 2017-05-15 | End: 2017-05-16 | Stop reason: HOSPADM

## 2017-05-15 RX ORDER — HYDROMORPHONE HYDROCHLORIDE 1 MG/ML
0.5 INJECTION, SOLUTION INTRAMUSCULAR; INTRAVENOUS; SUBCUTANEOUS
Status: DISCONTINUED | OUTPATIENT
Start: 2017-05-15 | End: 2017-05-16 | Stop reason: HOSPADM

## 2017-05-15 RX ORDER — METOCLOPRAMIDE HYDROCHLORIDE 5 MG/ML
10 INJECTION INTRAMUSCULAR; INTRAVENOUS EVERY 6 HOURS
Status: DISCONTINUED | OUTPATIENT
Start: 2017-05-15 | End: 2017-05-16 | Stop reason: HOSPADM

## 2017-05-15 RX ORDER — GLYCOPYRROLATE 0.2 MG/ML
INJECTION INTRAMUSCULAR; INTRAVENOUS AS NEEDED
Status: DISCONTINUED | OUTPATIENT
Start: 2017-05-15 | End: 2017-05-15 | Stop reason: SURG

## 2017-05-15 RX ORDER — HYDRALAZINE HYDROCHLORIDE 20 MG/ML
5 INJECTION INTRAMUSCULAR; INTRAVENOUS
Status: DISCONTINUED | OUTPATIENT
Start: 2017-05-15 | End: 2017-05-15 | Stop reason: HOSPADM

## 2017-05-15 RX ORDER — FLUMAZENIL 0.1 MG/ML
0.2 INJECTION INTRAVENOUS AS NEEDED
Status: DISCONTINUED | OUTPATIENT
Start: 2017-05-15 | End: 2017-05-15 | Stop reason: HOSPADM

## 2017-05-15 RX ORDER — HYDROMORPHONE HYDROCHLORIDE 2 MG/1
2 TABLET ORAL EVERY 4 HOURS PRN
Status: DISCONTINUED | OUTPATIENT
Start: 2017-05-16 | End: 2017-05-16 | Stop reason: HOSPADM

## 2017-05-15 RX ORDER — HYDROMORPHONE HYDROCHLORIDE 1 MG/ML
0.5 INJECTION, SOLUTION INTRAMUSCULAR; INTRAVENOUS; SUBCUTANEOUS
Status: DISCONTINUED | OUTPATIENT
Start: 2017-05-15 | End: 2017-05-15 | Stop reason: HOSPADM

## 2017-05-15 RX ORDER — SODIUM CHLORIDE, SODIUM LACTATE, POTASSIUM CHLORIDE, CALCIUM CHLORIDE 600; 310; 30; 20 MG/100ML; MG/100ML; MG/100ML; MG/100ML
9 INJECTION, SOLUTION INTRAVENOUS CONTINUOUS
Status: DISCONTINUED | OUTPATIENT
Start: 2017-05-15 | End: 2017-05-15 | Stop reason: HOSPADM

## 2017-05-15 RX ORDER — SUCCINYLCHOLINE CHLORIDE 20 MG/ML
INJECTION INTRAMUSCULAR; INTRAVENOUS AS NEEDED
Status: DISCONTINUED | OUTPATIENT
Start: 2017-05-15 | End: 2017-05-15 | Stop reason: SURG

## 2017-05-15 RX ORDER — SCOLOPAMINE TRANSDERMAL SYSTEM 1 MG/1
1 PATCH, EXTENDED RELEASE TRANSDERMAL ONCE
Status: DISCONTINUED | OUTPATIENT
Start: 2017-05-15 | End: 2017-05-15

## 2017-05-15 RX ORDER — MIDAZOLAM HYDROCHLORIDE 1 MG/ML
1 INJECTION INTRAMUSCULAR; INTRAVENOUS
Status: DISCONTINUED | OUTPATIENT
Start: 2017-05-15 | End: 2017-05-15 | Stop reason: HOSPADM

## 2017-05-15 RX ORDER — PROMETHAZINE HYDROCHLORIDE 25 MG/1
12.5 TABLET ORAL ONCE AS NEEDED
Status: DISCONTINUED | OUTPATIENT
Start: 2017-05-15 | End: 2017-05-15 | Stop reason: HOSPADM

## 2017-05-15 RX ORDER — HYDROCODONE BITARTRATE AND ACETAMINOPHEN 7.5; 325 MG/1; MG/1
1 TABLET ORAL ONCE AS NEEDED
Status: DISCONTINUED | OUTPATIENT
Start: 2017-05-15 | End: 2017-05-15 | Stop reason: HOSPADM

## 2017-05-15 RX ORDER — SODIUM CHLORIDE, SODIUM LACTATE, POTASSIUM CHLORIDE, CALCIUM CHLORIDE 600; 310; 30; 20 MG/100ML; MG/100ML; MG/100ML; MG/100ML
100 INJECTION, SOLUTION INTRAVENOUS CONTINUOUS
Status: DISCONTINUED | OUTPATIENT
Start: 2017-05-15 | End: 2017-05-15 | Stop reason: HOSPADM

## 2017-05-15 RX ORDER — SODIUM CHLORIDE, SODIUM LACTATE, POTASSIUM CHLORIDE, CALCIUM CHLORIDE 600; 310; 30; 20 MG/100ML; MG/100ML; MG/100ML; MG/100ML
150 INJECTION, SOLUTION INTRAVENOUS CONTINUOUS
Status: DISCONTINUED | OUTPATIENT
Start: 2017-05-15 | End: 2017-05-16 | Stop reason: HOSPADM

## 2017-05-15 RX ORDER — ROCURONIUM BROMIDE 10 MG/ML
INJECTION, SOLUTION INTRAVENOUS AS NEEDED
Status: DISCONTINUED | OUTPATIENT
Start: 2017-05-15 | End: 2017-05-15 | Stop reason: SURG

## 2017-05-15 RX ORDER — FAMOTIDINE 10 MG/ML
20 INJECTION, SOLUTION INTRAVENOUS ONCE
Status: COMPLETED | OUTPATIENT
Start: 2017-05-15 | End: 2017-05-15

## 2017-05-15 RX ORDER — DIPHENHYDRAMINE HYDROCHLORIDE 50 MG/ML
25 INJECTION INTRAMUSCULAR; INTRAVENOUS EVERY 4 HOURS PRN
Status: DISCONTINUED | OUTPATIENT
Start: 2017-05-15 | End: 2017-05-16 | Stop reason: HOSPADM

## 2017-05-15 RX ORDER — PROMETHAZINE HYDROCHLORIDE 25 MG/ML
12.5 INJECTION, SOLUTION INTRAMUSCULAR; INTRAVENOUS EVERY 6 HOURS PRN
Status: DISCONTINUED | OUTPATIENT
Start: 2017-05-15 | End: 2017-05-16 | Stop reason: HOSPADM

## 2017-05-15 RX ORDER — PROPOFOL 10 MG/ML
VIAL (ML) INTRAVENOUS AS NEEDED
Status: DISCONTINUED | OUTPATIENT
Start: 2017-05-15 | End: 2017-05-15 | Stop reason: SURG

## 2017-05-15 RX ORDER — KETOROLAC TROMETHAMINE 30 MG/ML
INJECTION, SOLUTION INTRAMUSCULAR; INTRAVENOUS AS NEEDED
Status: DISCONTINUED | OUTPATIENT
Start: 2017-05-15 | End: 2017-05-15 | Stop reason: SURG

## 2017-05-15 RX ORDER — PROMETHAZINE HYDROCHLORIDE 25 MG/1
25 TABLET ORAL ONCE AS NEEDED
Status: DISCONTINUED | OUTPATIENT
Start: 2017-05-15 | End: 2017-05-15 | Stop reason: HOSPADM

## 2017-05-15 RX ORDER — CEFAZOLIN SODIUM 2 G/100ML
2 INJECTION, SOLUTION INTRAVENOUS EVERY 8 HOURS
Status: COMPLETED | OUTPATIENT
Start: 2017-05-15 | End: 2017-05-16

## 2017-05-15 RX ORDER — CLONIDINE HYDROCHLORIDE 0.1 MG/1
0.1 TABLET ORAL EVERY 6 HOURS PRN
Status: DISCONTINUED | OUTPATIENT
Start: 2017-05-15 | End: 2017-05-16 | Stop reason: HOSPADM

## 2017-05-15 RX ORDER — NITROGLYCERIN 0.4 MG/1
0.4 TABLET SUBLINGUAL
Status: DISCONTINUED | OUTPATIENT
Start: 2017-05-15 | End: 2017-05-16 | Stop reason: HOSPADM

## 2017-05-15 RX ORDER — ONDANSETRON 4 MG/1
4 TABLET, FILM COATED ORAL EVERY 6 HOURS PRN
Status: DISCONTINUED | OUTPATIENT
Start: 2017-05-15 | End: 2017-05-16 | Stop reason: HOSPADM

## 2017-05-15 RX ORDER — DIPHENHYDRAMINE HYDROCHLORIDE 50 MG/ML
12.5 INJECTION INTRAMUSCULAR; INTRAVENOUS
Status: DISCONTINUED | OUTPATIENT
Start: 2017-05-15 | End: 2017-05-15 | Stop reason: HOSPADM

## 2017-05-15 RX ADMIN — DEXAMETHASONE SODIUM PHOSPHATE 8 MG: 10 INJECTION INTRAMUSCULAR; INTRAVENOUS at 12:40

## 2017-05-15 RX ADMIN — METOCLOPRAMIDE 10 MG: 5 INJECTION, SOLUTION INTRAMUSCULAR; INTRAVENOUS at 14:10

## 2017-05-15 RX ADMIN — SODIUM CHLORIDE, POTASSIUM CHLORIDE, SODIUM LACTATE AND CALCIUM CHLORIDE 150 ML/HR: 600; 310; 30; 20 INJECTION, SOLUTION INTRAVENOUS at 15:44

## 2017-05-15 RX ADMIN — CHLORHEXIDINE GLUCONATE 15 ML: 1.2 RINSE ORAL at 09:07

## 2017-05-15 RX ADMIN — SODIUM CHLORIDE, POTASSIUM CHLORIDE, SODIUM LACTATE AND CALCIUM CHLORIDE 9 ML/HR: 600; 310; 30; 20 INJECTION, SOLUTION INTRAVENOUS at 11:24

## 2017-05-15 RX ADMIN — ENOXAPARIN SODIUM 40 MG: 40 INJECTION SUBCUTANEOUS at 12:10

## 2017-05-15 RX ADMIN — CHLORHEXIDINE GLUCONATE 15 ML: 1.2 RINSE ORAL at 11:21

## 2017-05-15 RX ADMIN — FENTANYL CITRATE 50 MCG: 50 INJECTION INTRAMUSCULAR; INTRAVENOUS at 14:05

## 2017-05-15 RX ADMIN — FENTANYL CITRATE 50 MCG: 50 INJECTION INTRAMUSCULAR; INTRAVENOUS at 13:52

## 2017-05-15 RX ADMIN — SODIUM CHLORIDE, POTASSIUM CHLORIDE, SODIUM LACTATE AND CALCIUM CHLORIDE 150 ML/HR: 600; 310; 30; 20 INJECTION, SOLUTION INTRAVENOUS at 18:37

## 2017-05-15 RX ADMIN — METOCLOPRAMIDE 10 MG: 5 INJECTION, SOLUTION INTRAMUSCULAR; INTRAVENOUS at 20:45

## 2017-05-15 RX ADMIN — ROCURONIUM BROMIDE 5 MG: 10 INJECTION INTRAVENOUS at 12:23

## 2017-05-15 RX ADMIN — NEOSTIGMINE METHYLSULFATE 5 MG: 1 INJECTION INTRAVENOUS at 13:11

## 2017-05-15 RX ADMIN — HYDROMORPHONE HYDROCHLORIDE 0.5 MG: 1 INJECTION, SOLUTION INTRAMUSCULAR; INTRAVENOUS; SUBCUTANEOUS at 14:13

## 2017-05-15 RX ADMIN — KETOROLAC TROMETHAMINE 30 MG: 30 INJECTION, SOLUTION INTRAMUSCULAR at 20:45

## 2017-05-15 RX ADMIN — ONDANSETRON 4 MG: 2 INJECTION INTRAMUSCULAR; INTRAVENOUS at 13:52

## 2017-05-15 RX ADMIN — HYDROMORPHONE HYDROCHLORIDE 0.5 MG: 1 INJECTION, SOLUTION INTRAMUSCULAR; INTRAVENOUS; SUBCUTANEOUS at 14:23

## 2017-05-15 RX ADMIN — PROPOFOL 200 MG: 10 INJECTION, EMULSION INTRAVENOUS at 12:23

## 2017-05-15 RX ADMIN — CEFAZOLIN 3 G: 1 INJECTION, POWDER, FOR SOLUTION INTRAMUSCULAR; INTRAVENOUS; PARENTERAL at 12:18

## 2017-05-15 RX ADMIN — FENTANYL CITRATE 100 MCG: 50 INJECTION INTRAMUSCULAR; INTRAVENOUS at 12:23

## 2017-05-15 RX ADMIN — FENTANYL CITRATE 50 MCG: 50 INJECTION INTRAMUSCULAR; INTRAVENOUS at 14:24

## 2017-05-15 RX ADMIN — SUCCINYLCHOLINE CHLORIDE 160 MG: 20 INJECTION, SOLUTION INTRAMUSCULAR; INTRAVENOUS; PARENTERAL at 12:23

## 2017-05-15 RX ADMIN — HYOSCYAMINE SULFATE 125 MCG: 0.12 TABLET ORAL at 20:45

## 2017-05-15 RX ADMIN — PANTOPRAZOLE SODIUM 40 MG: 40 INJECTION, POWDER, FOR SOLUTION INTRAVENOUS at 09:07

## 2017-05-15 RX ADMIN — ONDANSETRON 4 MG: 2 INJECTION INTRAMUSCULAR; INTRAVENOUS at 12:40

## 2017-05-15 RX ADMIN — SUGAMMADEX 200 MG: 100 INJECTION, SOLUTION INTRAVENOUS at 13:15

## 2017-05-15 RX ADMIN — HYOSCYAMINE SULFATE 125 MCG: 0.12 TABLET ORAL at 18:51

## 2017-05-15 RX ADMIN — FENTANYL CITRATE 50 MCG: 50 INJECTION INTRAMUSCULAR; INTRAVENOUS at 14:56

## 2017-05-15 RX ADMIN — ACETAMINOPHEN 1000 MG: 10 INJECTION, SOLUTION INTRAVENOUS at 12:40

## 2017-05-15 RX ADMIN — SODIUM CHLORIDE, POTASSIUM CHLORIDE, SODIUM LACTATE AND CALCIUM CHLORIDE 500 ML: 600; 310; 30; 20 INJECTION, SOLUTION INTRAVENOUS at 09:07

## 2017-05-15 RX ADMIN — ALBUTEROL SULFATE 2.5 MG: 2.5 SOLUTION RESPIRATORY (INHALATION) at 19:00

## 2017-05-15 RX ADMIN — SCOPOLAMINE 1 PATCH: 1 PATCH, EXTENDED RELEASE TRANSDERMAL at 09:07

## 2017-05-15 RX ADMIN — KETOROLAC TROMETHAMINE 30 MG: 30 INJECTION, SOLUTION INTRAMUSCULAR; INTRAVENOUS at 12:40

## 2017-05-15 RX ADMIN — SODIUM CHLORIDE, POTASSIUM CHLORIDE, SODIUM LACTATE AND CALCIUM CHLORIDE: 600; 310; 30; 20 INJECTION, SOLUTION INTRAVENOUS at 13:05

## 2017-05-15 RX ADMIN — ROCURONIUM BROMIDE 45 MG: 10 INJECTION INTRAVENOUS at 12:32

## 2017-05-15 RX ADMIN — KETOROLAC TROMETHAMINE 30 MG: 30 INJECTION, SOLUTION INTRAMUSCULAR at 16:44

## 2017-05-15 RX ADMIN — HYDROMORPHONE HYDROCHLORIDE 0.5 MG: 1 INJECTION, SOLUTION INTRAMUSCULAR; INTRAVENOUS; SUBCUTANEOUS at 19:38

## 2017-05-15 RX ADMIN — MIDAZOLAM 2 MG: 1 INJECTION INTRAMUSCULAR; INTRAVENOUS at 11:22

## 2017-05-15 RX ADMIN — CEFAZOLIN SODIUM 2 G: 2 INJECTION, SOLUTION INTRAVENOUS at 20:45

## 2017-05-15 RX ADMIN — FAMOTIDINE 20 MG: 10 INJECTION, SOLUTION INTRAVENOUS at 11:22

## 2017-05-15 RX ADMIN — GLYCOPYRROLATE 0.8 MG: 0.2 INJECTION INTRAMUSCULAR; INTRAVENOUS at 13:11

## 2017-05-16 VITALS
WEIGHT: 279.2 LBS | SYSTOLIC BLOOD PRESSURE: 121 MMHG | HEIGHT: 65 IN | RESPIRATION RATE: 16 BRPM | HEART RATE: 81 BPM | DIASTOLIC BLOOD PRESSURE: 81 MMHG | OXYGEN SATURATION: 95 % | BODY MASS INDEX: 46.52 KG/M2 | TEMPERATURE: 98.1 F

## 2017-05-16 LAB
ALBUMIN SERPL-MCNC: 3.6 G/DL (ref 3.5–5.2)
ALBUMIN/GLOB SERPL: 1 G/DL
ALP SERPL-CCNC: 118 U/L (ref 39–117)
ALT SERPL W P-5'-P-CCNC: 24 U/L (ref 1–33)
ANION GAP SERPL CALCULATED.3IONS-SCNC: 13.6 MMOL/L
AST SERPL-CCNC: 29 U/L (ref 1–32)
BASOPHILS # BLD AUTO: 0.03 10*3/MM3 (ref 0–0.2)
BASOPHILS NFR BLD AUTO: 0.3 % (ref 0–1.5)
BILIRUB SERPL-MCNC: 0.2 MG/DL (ref 0.1–1.2)
BUN BLD-MCNC: 21 MG/DL (ref 6–20)
BUN/CREAT SERPL: 21.4 (ref 7–25)
CALCIUM SPEC-SCNC: 9 MG/DL (ref 8.6–10.5)
CHLORIDE SERPL-SCNC: 102 MMOL/L (ref 98–107)
CO2 SERPL-SCNC: 23.4 MMOL/L (ref 22–29)
CREAT BLD-MCNC: 0.98 MG/DL (ref 0.57–1)
DEPRECATED RDW RBC AUTO: 50.3 FL (ref 37–54)
EOSINOPHIL # BLD AUTO: 0.01 10*3/MM3 (ref 0–0.7)
EOSINOPHIL NFR BLD AUTO: 0.1 % (ref 0.3–6.2)
ERYTHROCYTE [DISTWIDTH] IN BLOOD BY AUTOMATED COUNT: 15.7 % (ref 11.7–13)
GFR SERPL CREATININE-BSD FRML MDRD: 60 ML/MIN/1.73
GLOBULIN UR ELPH-MCNC: 3.5 GM/DL
GLUCOSE BLD-MCNC: 122 MG/DL (ref 65–99)
HCT VFR BLD AUTO: 37.8 % (ref 35.6–45.5)
HGB BLD-MCNC: 11.9 G/DL (ref 11.9–15.5)
IMM GRANULOCYTES # BLD: 0.02 10*3/MM3 (ref 0–0.03)
IMM GRANULOCYTES NFR BLD: 0.2 % (ref 0–0.5)
LYMPHOCYTES # BLD AUTO: 1.43 10*3/MM3 (ref 0.9–4.8)
LYMPHOCYTES NFR BLD AUTO: 12 % (ref 19.6–45.3)
MAGNESIUM SERPL-MCNC: 2.5 MG/DL (ref 1.6–2.6)
MCH RBC QN AUTO: 27.4 PG (ref 26.9–32)
MCHC RBC AUTO-ENTMCNC: 31.5 G/DL (ref 32.4–36.3)
MCV RBC AUTO: 86.9 FL (ref 80.5–98.2)
MONOCYTES # BLD AUTO: 0.59 10*3/MM3 (ref 0.2–1.2)
MONOCYTES NFR BLD AUTO: 5 % (ref 5–12)
NEUTROPHILS # BLD AUTO: 9.8 10*3/MM3 (ref 1.9–8.1)
NEUTROPHILS NFR BLD AUTO: 82.4 % (ref 42.7–76)
PHOSPHATE SERPL-MCNC: 3.7 MG/DL (ref 2.5–4.5)
PLATELET # BLD AUTO: 220 10*3/MM3 (ref 140–500)
PMV BLD AUTO: 10.5 FL (ref 6–12)
POTASSIUM BLD-SCNC: 4.4 MMOL/L (ref 3.5–5.2)
PROT SERPL-MCNC: 7.1 G/DL (ref 6–8.5)
RBC # BLD AUTO: 4.35 10*6/MM3 (ref 3.9–5.2)
SODIUM BLD-SCNC: 139 MMOL/L (ref 136–145)
WBC NRBC COR # BLD: 11.88 10*3/MM3 (ref 4.5–10.7)

## 2017-05-16 PROCEDURE — 80053 COMPREHEN METABOLIC PANEL: CPT | Performed by: SURGERY

## 2017-05-16 PROCEDURE — 25010000002 KETOROLAC TROMETHAMINE PER 15 MG: Performed by: SURGERY

## 2017-05-16 PROCEDURE — 94799 UNLISTED PULMONARY SVC/PX: CPT

## 2017-05-16 PROCEDURE — 25010000002 THIAMINE PER 100 MG: Performed by: SURGERY

## 2017-05-16 PROCEDURE — 85025 COMPLETE CBC W/AUTO DIFF WBC: CPT | Performed by: SURGERY

## 2017-05-16 PROCEDURE — 25010000003 CEFAZOLIN IN DEXTROSE 2-4 GM/100ML-% SOLUTION: Performed by: SURGERY

## 2017-05-16 PROCEDURE — 84100 ASSAY OF PHOSPHORUS: CPT | Performed by: SURGERY

## 2017-05-16 PROCEDURE — 25010000002 HYDROMORPHONE PER 4 MG: Performed by: SURGERY

## 2017-05-16 PROCEDURE — 83735 ASSAY OF MAGNESIUM: CPT | Performed by: SURGERY

## 2017-05-16 PROCEDURE — 25010000002 PYRIDOXINE PER 100 MG: Performed by: SURGERY

## 2017-05-16 PROCEDURE — 25010000002 METOCLOPRAMIDE PER 10 MG: Performed by: SURGERY

## 2017-05-16 PROCEDURE — 25010000002 ENOXAPARIN PER 10 MG: Performed by: SURGERY

## 2017-05-16 PROCEDURE — 25010000002 CYANOCOBALAMIN PER 1000 MCG: Performed by: SURGERY

## 2017-05-16 RX ADMIN — HYOSCYAMINE SULFATE 125 MCG: 0.12 TABLET ORAL at 08:30

## 2017-05-16 RX ADMIN — ENOXAPARIN SODIUM 40 MG: 40 INJECTION SUBCUTANEOUS at 08:29

## 2017-05-16 RX ADMIN — KETOROLAC TROMETHAMINE 30 MG: 30 INJECTION, SOLUTION INTRAMUSCULAR at 12:01

## 2017-05-16 RX ADMIN — ALBUTEROL SULFATE 2.5 MG: 2.5 SOLUTION RESPIRATORY (INHALATION) at 12:07

## 2017-05-16 RX ADMIN — CEFAZOLIN SODIUM 2 G: 2 INJECTION, SOLUTION INTRAVENOUS at 03:54

## 2017-05-16 RX ADMIN — HYDROCODONE BITARTRATE AND ACETAMINOPHEN 15 ML: 7.5; 325 SOLUTION ORAL at 08:30

## 2017-05-16 RX ADMIN — FOLIC ACID 100 ML/HR: 5 INJECTION, SOLUTION INTRAMUSCULAR; INTRAVENOUS; SUBCUTANEOUS at 08:29

## 2017-05-16 RX ADMIN — HYOSCYAMINE SULFATE 125 MCG: 0.12 TABLET ORAL at 12:02

## 2017-05-16 RX ADMIN — METOPROLOL TARTRATE 25 MG: 25 TABLET ORAL at 08:30

## 2017-05-16 RX ADMIN — METOCLOPRAMIDE 10 MG: 5 INJECTION, SOLUTION INTRAMUSCULAR; INTRAVENOUS at 08:35

## 2017-05-16 RX ADMIN — SODIUM CHLORIDE, POTASSIUM CHLORIDE, SODIUM LACTATE AND CALCIUM CHLORIDE 150 ML/HR: 600; 310; 30; 20 INJECTION, SOLUTION INTRAVENOUS at 02:00

## 2017-05-16 RX ADMIN — HYDROMORPHONE HYDROCHLORIDE 0.5 MG: 1 INJECTION, SOLUTION INTRAMUSCULAR; INTRAVENOUS; SUBCUTANEOUS at 00:05

## 2017-05-16 RX ADMIN — HYDROMORPHONE HYDROCHLORIDE 0.5 MG: 1 INJECTION, SOLUTION INTRAMUSCULAR; INTRAVENOUS; SUBCUTANEOUS at 03:48

## 2017-05-16 RX ADMIN — KETOROLAC TROMETHAMINE 30 MG: 30 INJECTION, SOLUTION INTRAMUSCULAR at 08:30

## 2017-05-16 RX ADMIN — ALBUTEROL SULFATE 2.5 MG: 2.5 SOLUTION RESPIRATORY (INHALATION) at 07:21

## 2017-05-16 RX ADMIN — PANTOPRAZOLE SODIUM 40 MG: 40 INJECTION, POWDER, FOR SOLUTION INTRAVENOUS at 06:17

## 2017-05-16 RX ADMIN — CYANOCOBALAMIN 1000 MCG: 1000 INJECTION, SOLUTION INTRAMUSCULAR at 08:29

## 2017-05-18 ENCOUNTER — OFFICE VISIT (OUTPATIENT)
Dept: BARIATRICS/WEIGHT MGMT | Facility: CLINIC | Age: 52
End: 2017-05-18

## 2017-05-18 ENCOUNTER — DOCUMENTATION (OUTPATIENT)
Dept: BARIATRICS/WEIGHT MGMT | Facility: CLINIC | Age: 52
End: 2017-05-18

## 2017-05-18 VITALS
WEIGHT: 263 LBS | SYSTOLIC BLOOD PRESSURE: 134 MMHG | BODY MASS INDEX: 43.82 KG/M2 | HEART RATE: 76 BPM | DIASTOLIC BLOOD PRESSURE: 83 MMHG | TEMPERATURE: 98.9 F | RESPIRATION RATE: 16 BRPM | HEIGHT: 65 IN

## 2017-05-18 DIAGNOSIS — I50.30 DIASTOLIC CONGESTIVE HEART FAILURE, UNSPECIFIED CONGESTIVE HEART FAILURE CHRONICITY: ICD-10-CM

## 2017-05-18 DIAGNOSIS — R53.82 CHRONIC FATIGUE: ICD-10-CM

## 2017-05-18 DIAGNOSIS — E78.5 HYPERLIPIDEMIA, UNSPECIFIED HYPERLIPIDEMIA TYPE: ICD-10-CM

## 2017-05-18 DIAGNOSIS — I10 ESSENTIAL HYPERTENSION: ICD-10-CM

## 2017-05-18 DIAGNOSIS — G47.33 OSA (OBSTRUCTIVE SLEEP APNEA): ICD-10-CM

## 2017-05-18 DIAGNOSIS — E66.01 OBESITY, CLASS III, BMI 40-49.9 (MORBID OBESITY) (HCC): Primary | ICD-10-CM

## 2017-05-18 DIAGNOSIS — K21.9 GASTROESOPHAGEAL REFLUX DISEASE WITHOUT ESOPHAGITIS: ICD-10-CM

## 2017-05-18 PROCEDURE — 99024 POSTOP FOLLOW-UP VISIT: CPT | Performed by: NURSE PRACTITIONER

## 2017-06-19 ENCOUNTER — OFFICE VISIT (OUTPATIENT)
Dept: BARIATRICS/WEIGHT MGMT | Facility: CLINIC | Age: 52
End: 2017-06-19

## 2017-06-19 VITALS
RESPIRATION RATE: 16 BRPM | WEIGHT: 248 LBS | TEMPERATURE: 98.6 F | HEIGHT: 65 IN | DIASTOLIC BLOOD PRESSURE: 84 MMHG | HEART RATE: 75 BPM | SYSTOLIC BLOOD PRESSURE: 154 MMHG | BODY MASS INDEX: 41.32 KG/M2

## 2017-06-19 DIAGNOSIS — I10 ESSENTIAL HYPERTENSION: ICD-10-CM

## 2017-06-19 DIAGNOSIS — E78.5 HYPERLIPIDEMIA, UNSPECIFIED HYPERLIPIDEMIA TYPE: ICD-10-CM

## 2017-06-19 DIAGNOSIS — R53.82 CHRONIC FATIGUE: ICD-10-CM

## 2017-06-19 DIAGNOSIS — E66.01 OBESITY, CLASS III, BMI 40-49.9 (MORBID OBESITY) (HCC): Primary | ICD-10-CM

## 2017-06-19 DIAGNOSIS — G47.33 OSA (OBSTRUCTIVE SLEEP APNEA): ICD-10-CM

## 2017-06-19 PROCEDURE — 99024 POSTOP FOLLOW-UP VISIT: CPT | Performed by: NURSE PRACTITIONER

## 2017-06-19 NOTE — PROGRESS NOTES
[facility] BARIATRIC SURGERY  [address]  [facility] [address] [phone]  [address]  [facility] [address] [phone]  Dept: [phone]  6/19/2017      Rosetta Hunt.  [id number]  [id number]  1965  female      Chief Complaint   Patient presents with   • Follow-up     s/p gastric sleeve c/o constipation       BH Post-Op Bariatric Surgery:    Rosetta Hunt is status post laparopscopic Sleeve procedure, performed on 5/15/17.    HPI:   Today's weight is 248 lb (112 kg) pounds, today's BMI is Body mass index is 41.91 kg/(m^2)., she has a  loss of 15 pounds since the last visit and her weight loss since surgery is 20 pounds. The patient reports a decreased portion size and loss of appetite.      Rosetta Hunt denies nausea, vomiting, dysphagia, abdominal pain or heartburn.     Diet and Exercise: Diet history reviewed and discussed with the patient. Weight loss/gains to date discussed with the patient. The patient states they are eating 50-60 grams of protein per day. She reports eating 2 meals per day, a typical portion size of 1 cup, eating 2 snacks per day, drinking 4 or more 8-oz. glasses of water per day, no carbonated beverage consumption and exercising regularly.     Reports that she has been able to tolerate potatoes, eggs, pizza. Reports that she was able to tolerate a salad yesterday.     Supplements: bariatric advantage MTV with iron    Review of Systems   Constitutional: Negative for fatigue and unexpected weight change.   HENT: Negative.    Eyes: Negative.    Respiratory: Negative.    Cardiovascular: Negative.  Negative for leg swelling.   Gastrointestinal: Negative for abdominal distention, abdominal pain, constipation, diarrhea, nausea and vomiting.   Genitourinary: Negative for difficulty urinating, frequency and urgency.   Musculoskeletal: Negative for back pain.   Skin: Negative.    Psychiatric/Behavioral: Negative.    All  other systems reviewed and are negative.      Patient Active Problem List   Diagnosis   • Degeneration of intervertebral disc of lumbar region   • Chronic back pain   • Obesity, Class III, BMI 40-49.9 (morbid obesity)   • MANI (obstructive sleep apnea)   • GERD (gastroesophageal reflux disease)   • Hyperlipidemia   • Essential hypertension   • Chronic fatigue   • Edema   • Asthma   • IBS (irritable bowel syndrome)   • Renal insufficiency   • Multiple joint pain   • Depression   • Congestive heart failure   • BMI 40.0-44.9, adult       The following portions of the patient's history were reviewed and updated as appropriate: allergies, current medications, past family history, past medical history, past social history, past surgical history and problem list.    Vitals:    06/19/17 1007   BP: 154/84   Pulse: 75   Resp: 16   Temp: 98.6 °F (37 °C)       Physical Exam   Cardiovascular: Normal rate, regular rhythm, normal heart sounds and intact distal pulses.    Pulmonary/Chest: Effort normal and breath sounds normal. No respiratory distress. She has no wheezes.   Abdominal: Soft. Bowel sounds are normal. She exhibits no distension. There is no tenderness.   Skin: Skin is warm, dry and intact. No rash noted.         Assessment:   Post-op, the patient is doing well.     Plan:     Encouraged patient to be sure to get plenty of lean protein per day through small frequent meals all with a protein source.   Activity restrictions: none.   Recommended patient be sure to get at least 70 grams of protein per day by eating small, frequent meals all with high lean protein choices. Be sure to limit/cut back on daily carbohydrate intake. Discussed with the patient the recommended amount of water per day to intake- half of body weight in ounces. Reviewed vitamin requirements. Be sure to do routine exercise, 150 minutes per week minimum, including both cardio and strength training.     Instructions / Recommendations: dietary counseling  recommended, recommended a daily protein intake of  grams, vitamin supplement(s) recommended, recommended exercising at least 150 minutes per week, behavior modifications recommended and instructed to call the office for concerns, questions, or problems.       Patient encouraged to keep advancing diet in accordance with bariatric surgical binder provided by our office as tolerated. Encouraged to continue walking and using IS at home. Recommended using metamucil and starting a probiotic to aid with regulating bowel regimen and adding Mirilax if discomfort or constipation is not relieved in a few days time. Advised to call if experiencing worsening abdominal pain or high grade fever. Recommended taking tylenol or prescribed pain medication to manage incisional pain and intermittently icing incisions as needed for discomfort.   The patient was instructed to follow up in 2months .     The patient was counseled regarding dietary progression, exercise, fluid intake, bowel habits. Total time spent face to face was 25 minutes and more than half the time was spent counseling.

## 2017-08-16 RX ORDER — POTASSIUM CHLORIDE 750 MG/1
10 TABLET, FILM COATED, EXTENDED RELEASE ORAL 2 TIMES DAILY
Qty: 15 TABLET | Refills: 0 | Status: SHIPPED | OUTPATIENT
Start: 2017-08-16 | End: 2022-01-11 | Stop reason: SDUPTHER

## 2017-08-18 ENCOUNTER — TELEPHONE (OUTPATIENT)
Dept: BARIATRICS/WEIGHT MGMT | Facility: CLINIC | Age: 52
End: 2017-08-18

## 2017-08-18 NOTE — TELEPHONE ENCOUNTER
LVM for pt regarding lab results. Pt to take 5000 iu otc vitamin d and a prescription for potassium was called in to pharmacy.

## 2017-09-07 ENCOUNTER — OFFICE VISIT (OUTPATIENT)
Dept: BARIATRICS/WEIGHT MGMT | Facility: CLINIC | Age: 52
End: 2017-09-07

## 2017-09-07 VITALS
RESPIRATION RATE: 16 BRPM | TEMPERATURE: 97.9 F | HEIGHT: 65 IN | WEIGHT: 230 LBS | DIASTOLIC BLOOD PRESSURE: 83 MMHG | HEART RATE: 85 BPM | SYSTOLIC BLOOD PRESSURE: 137 MMHG | BODY MASS INDEX: 38.32 KG/M2

## 2017-09-07 DIAGNOSIS — K21.9 GASTROESOPHAGEAL REFLUX DISEASE WITHOUT ESOPHAGITIS: ICD-10-CM

## 2017-09-07 DIAGNOSIS — E55.9 VITAMIN D DEFICIENCY: ICD-10-CM

## 2017-09-07 DIAGNOSIS — E66.9 OBESITY, CLASS II, BMI 35-39.9: Primary | ICD-10-CM

## 2017-09-07 DIAGNOSIS — I10 ESSENTIAL HYPERTENSION: ICD-10-CM

## 2017-09-07 DIAGNOSIS — R53.82 CHRONIC FATIGUE: ICD-10-CM

## 2017-09-07 DIAGNOSIS — E78.5 HYPERLIPIDEMIA, UNSPECIFIED HYPERLIPIDEMIA TYPE: ICD-10-CM

## 2017-09-07 DIAGNOSIS — G47.33 OSA (OBSTRUCTIVE SLEEP APNEA): ICD-10-CM

## 2017-09-07 PROBLEM — R79.89 ABNORMAL CBC: Status: ACTIVE | Noted: 2017-09-07

## 2017-09-07 PROBLEM — E66.812 OBESITY, CLASS II, BMI 35-39.9: Status: ACTIVE | Noted: 2017-01-13

## 2017-09-07 PROCEDURE — 99213 OFFICE O/P EST LOW 20 MIN: CPT | Performed by: NURSE PRACTITIONER

## 2017-09-07 NOTE — PROGRESS NOTES
[facility] BARIATRIC SURGERY  [address]  [facility] [address] [phone]  [address]  [facility] [address] [phone]  Dept: [phone]  9/7/2017      Rosetta Hunt.  [id number]  [id number]  1965  female      Chief Complaint   Patient presents with   • Follow-up     3 month f/u [facility] Post-Op Bariatric Surgery:   Rosetta Hunt is status post Laparoscopic Sleeve procedure, performed on 5/15/17     HPI:   Today's weight is 230 lb (104 kg) pounds, today's BMI is Body mass index is 38.87 kg/(m^2)., she has a  loss of 18 pounds since the last visit and her weight loss since surgery is 38 pounds. The patient reports a decreased portion size and loss of appetite.      Rosetta Hunt denies nausea, vomiting, dysphagia, abdominal pain or heartburn.     Diet and Exercise: Diet history reviewed and discussed with the patient. Weight loss/gains to date discussed with the patient. The patient states they are eating 40-50 grams of protein per day. She reports eating 2 meals per day, a typical portion size of 1 cup, eating 2 snacks per day, drinking 6 or more 8-oz. glasses of water per day, no carbonated beverage consumption. Denies regular exercise.    Reports that she had a urinary stent and has been in a lot of pain.     Supplements: OTC 1 a day vitamin.     Review of Systems   Constitutional: Positive for appetite change. Negative for fatigue and unexpected weight change.   HENT: Negative.    Eyes: Negative.    Respiratory: Negative.    Cardiovascular: Negative.  Negative for leg swelling.   Gastrointestinal: Negative for abdominal distention, abdominal pain, constipation, diarrhea, nausea and vomiting.   Genitourinary: Negative for difficulty urinating, frequency and urgency.   Musculoskeletal: Negative for back pain.   Skin: Negative.    Psychiatric/Behavioral: Negative.    All other systems reviewed and are negative.      Patient Active  Problem List   Diagnosis   • Degeneration of intervertebral disc of lumbar region   • Chronic back pain   • Obesity, Class II, BMI 35-39.9   • MANI (obstructive sleep apnea)   • GERD (gastroesophageal reflux disease)   • Hyperlipidemia   • Essential hypertension   • Chronic fatigue   • Edema   • Asthma   • IBS (irritable bowel syndrome)   • Renal insufficiency   • Multiple joint pain   • Depression   • Congestive heart failure   • Vitamin D deficiency   • Abnormal CBC       Past Medical History:   Diagnosis Date   • Arthritis    • Asthma    • CHF (congestive heart failure)    • Colitis    • COPD (chronic obstructive pulmonary disease)    • Depression    • Dysphagia    • Edema    • Fatigue    • GERD (gastroesophageal reflux disease)    • History of acute pancreatitis    • History of histoplasmosis    • HTN (hypertension)    • Hyperlipidemia    • Light headed    • Migraines    • Multiple joint pain    • On home oxygen therapy     2 L AT NIGHT   • MANI (obstructive sleep apnea)     WEARS CPAP   • RLS (restless legs syndrome)        The following portions of the patient's history were reviewed and updated as appropriate: allergies, current medications, past family history, past medical history, past social history, past surgical history and problem list.    Vitals:    09/07/17 1143   BP: 137/83   Pulse: 85   Resp: 16   Temp: 97.9 °F (36.6 °C)       Physical Exam   Constitutional: She appears well-developed and well-nourished.   Neck: No thyromegaly present.   Cardiovascular: Normal rate, regular rhythm and normal heart sounds.    Pulmonary/Chest: Effort normal and breath sounds normal. No respiratory distress. She has no wheezes.   Abdominal: Soft. Bowel sounds are normal. She exhibits no distension. There is no tenderness. There is no guarding. No hernia.   Musculoskeletal: She exhibits no edema or tenderness.   Neurological: She is alert.   Skin: Skin is warm and dry. No rash noted. No erythema.   Psychiatric: She has a  normal mood and affect. Her behavior is normal.   Nursing note and vitals reviewed.        Assessment:   Post-op, the patient is doing well.     Encounter Diagnoses   Name Primary?   • Obesity, Class II, BMI 35-39.9 Yes   • MANI (obstructive sleep apnea)    • Hyperlipidemia, unspecified hyperlipidemia type    • Essential hypertension    • Gastroesophageal reflux disease without esophagitis    • Chronic fatigue        Plan:     Encouraged patient to be sure to get plenty of lean protein per day through small frequent meals all with a protein source.   Activity restrictions: none.   Recommended patient be sure to get at least 70 grams of protein per day by eating small, frequent meals all with high lean protein choices. Be sure to limit/cut back on daily carbohydrate intake. Discussed with the patient the recommended amount of water per day to intake- half of body weight in ounces. Reviewed vitamin requirements. Be sure to do routine exercise, 150 minutes per week minimum, including both cardio and strength training.     Patient counseled about the importance of a bariatric vitamin to help correct deficiencies on her 1 month lab work. Follow up lab work written today.     Instructions / Recommendations: dietary counseling recommended, recommended a daily protein intake of  grams, vitamin supplement(s) recommended, recommended exercising at least 150 minutes per week, behavior modifications recommended and instructed to call the office for concerns, questions, or problems.     The patient was instructed to follow up in 3 months .     The patient was counseled regarding protein intake, e. Total time spent face to face was 20 minutes and 15 minutes was spent counseling.

## 2018-01-18 ENCOUNTER — TELEPHONE (OUTPATIENT)
Dept: BARIATRICS/WEIGHT MGMT | Facility: CLINIC | Age: 53
End: 2018-01-18

## 2018-01-18 DIAGNOSIS — E66.9 OBESITY, CLASS II, BMI 35-39.9: ICD-10-CM

## 2018-01-18 DIAGNOSIS — I10 ESSENTIAL HYPERTENSION: ICD-10-CM

## 2018-01-18 DIAGNOSIS — R53.82 CHRONIC FATIGUE: ICD-10-CM

## 2018-01-18 DIAGNOSIS — G47.33 OSA (OBSTRUCTIVE SLEEP APNEA): ICD-10-CM

## 2018-01-18 DIAGNOSIS — K21.9 GASTROESOPHAGEAL REFLUX DISEASE WITHOUT ESOPHAGITIS: ICD-10-CM

## 2018-01-18 DIAGNOSIS — E78.5 HYPERLIPIDEMIA, UNSPECIFIED HYPERLIPIDEMIA TYPE: ICD-10-CM

## 2018-01-18 RX ORDER — POTASSIUM CHLORIDE 20 MEQ/1
20 TABLET, EXTENDED RELEASE ORAL 2 TIMES DAILY
Qty: 15 TABLET | Refills: 0 | Status: ON HOLD | OUTPATIENT
Start: 2018-01-18 | End: 2021-08-06

## 2018-01-19 ENCOUNTER — TELEPHONE (OUTPATIENT)
Dept: BARIATRICS/WEIGHT MGMT | Facility: CLINIC | Age: 53
End: 2018-01-19

## 2018-01-19 DIAGNOSIS — E87.6 HYPOPOTASSEMIA: Primary | ICD-10-CM

## 2018-01-19 NOTE — TELEPHONE ENCOUNTER
Spoke with the patient.  She will begin potassium supplementation and have potassium level redrawn at [facility]

## 2018-01-19 NOTE — TELEPHONE ENCOUNTER
Spoke with the patient.  She will supplement potassium and then have her lab redrawn.  Order sent to [facility].

## 2018-01-19 NOTE — TELEPHONE ENCOUNTER
----- Message from ROSHNI Watson sent at 1/18/2018  3:42 PM EST -----  This patient's potassium was low. I am writing Kdur to be taken twice a day for 5 days then once per day for 5 days, so 10 total days. Then she should have her potassium rechecked.   ----- Message -----     From: Jason Jay MA     Sent: 1/18/2018   3:34 PM       To: ROSHNI Watson

## 2018-08-22 ENCOUNTER — OFFICE VISIT CONVERTED (OUTPATIENT)
Dept: PULMONOLOGY | Facility: CLINIC | Age: 53
End: 2018-08-22
Attending: INTERNAL MEDICINE

## 2019-04-23 ENCOUNTER — HOSPITAL ENCOUNTER (OUTPATIENT)
Dept: MAMMOGRAPHY | Facility: HOSPITAL | Age: 54
Discharge: HOME | End: 2019-04-23
Attending: NURSE PRACTITIONER

## 2019-07-01 ENCOUNTER — OFFICE VISIT (OUTPATIENT)
Dept: BARIATRICS/WEIGHT MGMT | Facility: CLINIC | Age: 54
End: 2019-07-01

## 2019-07-01 VITALS
WEIGHT: 257 LBS | HEIGHT: 64 IN | TEMPERATURE: 97.2 F | BODY MASS INDEX: 43.87 KG/M2 | SYSTOLIC BLOOD PRESSURE: 138 MMHG | HEART RATE: 71 BPM | DIASTOLIC BLOOD PRESSURE: 79 MMHG | RESPIRATION RATE: 18 BRPM

## 2019-07-01 DIAGNOSIS — R63.5 WEIGHT GAIN: ICD-10-CM

## 2019-07-01 DIAGNOSIS — E66.01 OBESITY, CLASS III, BMI 40-49.9 (MORBID OBESITY) (HCC): Primary | ICD-10-CM

## 2019-07-01 DIAGNOSIS — I10 ESSENTIAL HYPERTENSION: ICD-10-CM

## 2019-07-01 DIAGNOSIS — E55.9 VITAMIN D DEFICIENCY: ICD-10-CM

## 2019-07-01 DIAGNOSIS — E78.5 HYPERLIPIDEMIA, UNSPECIFIED HYPERLIPIDEMIA TYPE: ICD-10-CM

## 2019-07-01 DIAGNOSIS — K21.9 GASTROESOPHAGEAL REFLUX DISEASE WITHOUT ESOPHAGITIS: ICD-10-CM

## 2019-07-01 PROCEDURE — 99214 OFFICE O/P EST MOD 30 MIN: CPT | Performed by: NURSE PRACTITIONER

## 2019-07-01 RX ORDER — VENLAFAXINE 75 MG/1
225 TABLET ORAL DAILY
Status: ON HOLD | COMMUNITY
End: 2021-08-07

## 2019-07-01 NOTE — PROGRESS NOTES
"[facility] BARIATRIC SURGERY  [address] [facility] [address]  [facility] [id number] [phone]  [address] [address] [phone]  Dept: [phone]  7/1/2019      Rosetta Hunt.  [id number]  [id number]  1965  female      Chief Complaint   Patient presents with   • Follow-up     sleeve       [facility] Post-Op Bariatric Surgery:   Rosetta Hunt is status post Laparoscopic Sleeve procedure, performed on 5/15/17     HPI:   Today's weight is 117 kg (257 lb) pounds, today's BMI is Body mass index is 43.45 kg/m²., she has a  gain of 27 pounds since the last visit and her weight loss since surgery is 11 pounds. The patient reports a decreased portion size and loss of appetite.      Rosetta Hunt denies GERD, reflux or vomiting.  Patient does report constipation and related abdominal pain intermittent diarrhea and fatigue.  Does also report intermittent nausea.  Patient does report eating sweets drinking sodas as well as drinking liquid with meals as well as craving chocolate.      Diet and Exercise: Diet history reviewed and discussed with the patient. Weight loss/gains to date discussed with the patient. The patient states they are eating 30-60 grams of protein per day. She reports eating 3 meals per day, a typical portion size of 1-3 cup, eating 2 snacks per day, drinking 3 or more 8-oz. glasses of water per day, no carbonated beverage consumption.  Denies regular exercise    Patient reports despite his satisfaction with the results from her surgery.  She reports that because she can only eat small portions at a time and still has good restriction that she snacks all day and feels that she is hungry more often.  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  She reports that she does not like produce or \"diet foods\" which she defines as vegetables.    She gets very little fiber " throughout the day and eats a lot of processed and snacking foods.  She did ask about a revisional surgery for weight loss at this time as well as weight loss medication to help her    Supplements: OTC vitamin D and calcium.     Review of Systems   Constitutional: Positive for appetite change. Negative for fatigue and unexpected weight change.   HENT: Negative.    Eyes: Negative.    Respiratory: Negative.    Cardiovascular: Negative.  Negative for leg swelling.   Gastrointestinal: Positive for abdominal pain (r/t constipation), constipation, diarrhea and nausea. Negative for abdominal distention and vomiting.   Genitourinary: Negative for difficulty urinating, frequency and urgency.   Musculoskeletal: Negative for back pain.   Skin: Negative.    Psychiatric/Behavioral: Negative.    All other systems reviewed and are negative.      Patient Active Problem List   Diagnosis   • Degeneration of intervertebral disc of lumbar region   • Chronic back pain   • Obesity, Class III, BMI 40-49.9 (morbid obesity) (CMS/HCC)   • MANI (obstructive sleep apnea)   • GERD (gastroesophageal reflux disease)   • Hyperlipidemia   • Essential hypertension   • Chronic fatigue   • Edema   • Asthma   • IBS (irritable bowel syndrome)   • Renal insufficiency   • Multiple joint pain   • Depression   • Congestive heart failure (CMS/HCC)   • Vitamin D deficiency   • Abnormal CBC   • Weight gain       Past Medical History:   Diagnosis Date   • Arthritis    • Asthma    • CHF (congestive heart failure) (CMS/HCC)    • Colitis    • COPD (chronic obstructive pulmonary disease) (CMS/HCC)    • Depression    • Dysphagia    • Edema    • Fatigue    • GERD (gastroesophageal reflux disease)    • History of acute pancreatitis    • History of histoplasmosis    • HTN (hypertension)    • Hyperlipidemia    • Light headed    • Migraines    • Multiple joint pain    • On home oxygen therapy     2 L AT NIGHT   • MANI (obstructive sleep apnea)     WEARS CPAP   • RLS  (restless legs syndrome)        The following portions of the patient's history were reviewed and updated as appropriate: allergies, current medications, past family history, past medical history, past social history, past surgical history and problem list.    Vitals:    07/01/19 0944   BP: 138/79   Pulse: 71   Resp: 18   Temp: 97.2 °F (36.2 °C)       Physical Exam   Constitutional: She appears well-developed and well-nourished.   Neck: No thyromegaly present.   Cardiovascular: Normal rate, regular rhythm and normal heart sounds.   Pulmonary/Chest: Effort normal and breath sounds normal. No respiratory distress. She has no wheezes.   Abdominal: Soft. Bowel sounds are normal. She exhibits no distension. There is no tenderness. There is no guarding. No hernia.   Musculoskeletal: She exhibits no edema or tenderness.   Neurological: She is alert.   Skin: Skin is warm and dry. No rash noted. No erythema.   Psychiatric: She has a normal mood and affect. Her behavior is normal.   Nursing note and vitals reviewed.      Assessment:   Post-op, the patient is struggling with weight gain, constipation, pain syndrome, poor food choices related to nutrient density.     Encounter Diagnoses   Name Primary?   • Obesity, Class III, BMI 40-49.9 (morbid obesity) (CMS/HCC) Yes   • Essential hypertension    • Hyperlipidemia, unspecified hyperlipidemia type    • Gastroesophageal reflux disease without esophagitis    • Vitamin D deficiency    • Weight gain        Plan:   We discussed getting back to basics.  Patient will focus on getting her protein in first and getting 60 to 70 g of protein.  We plan to cut out all snacks and just focus on 3 solid meals per day.  We discussed quality food choices getting away from processed foods and simple carbohydrates.  Plan to cook more at home and limit herself on portion sizes to 8 ounces of meat and up to half a cup of produce at mealtime.  We will do a full panel on lab work today to rule out any  "vitamin deficiencies that she may be having.  We discussed how \"slider foods\" or highly processed foods and high fat high carb foods to move more quickly through the stomach and can contribute to diarrhea and loose stools.     She was encouraged to add 2 doses of unsweetened powdered Metamucil to her regimen to help add fiber to her diet and prevent constipation.  He is encouraged to follow-up with her dietitian for more prescriptive diet plan.  We also discussed daily therapeutic fasting.  We did discuss different options for weight loss medication unfortunately she is not a candidate for any of the medications that increase her serotonin levels due to her effexor.  We will cautiously consider starting patient on phentermine at the 15 mg dose at this time due to her blood pressure.  We will get an EKG to check on her cardiac status before prescribing.    We did discuss that what would be considered successful on this medication would be a loss of the summer between 11 and 12 pounds after 3 months of taking the maintenance dose of this medication.  It was reiterated to this patient that weight loss medication will not be enough to lose a large amount of weight by itself.  I stressed to her the importance of cognitive behavioral therapy if she is struggling with snacking eating and feeling resentful towards herself after eating throughout the day.  She does feel that she does not have control over her snacking and eating behaviors at this time and we discussed the possibility of being evaluated for binge eating disorder.    We did discuss the possibility of revision to a bypass.  But I did reiterate to this patient that not all patients lose a large percentage of weight following a revisional procedure and until she is able to regularly get exercise and make dietary choices that are nutritionally dense she will not successfully be able to maintain weight loss    Encouraged patient to be sure to get plenty of lean " protein per day through small frequent meals all with a protein source.   Activity restrictions: none.   Recommended patient be sure to get at least 70 grams of protein per day by eating small, frequent meals all with high lean protein choices. Be sure to limit/cut back on daily carbohydrate intake. Discussed with the patient the recommended amount of water per day to intake- half of body weight in ounces. Reviewed vitamin requirements. Be sure to do routine exercise, 150 minutes per week minimum, including both cardio and strength training.     Instructions / Recommendations: dietary counseling recommended, recommended a daily protein intake of  grams, vitamin supplement(s) recommended, recommended exercising at least 150 minutes per week, behavior modifications recommended and instructed to call the office for concerns, questions, or problems.     The patient was instructed to follow up in 1 month .     Total time spent face to face was 35 minutes and 25 minutes was spent counseling.

## 2019-09-13 ENCOUNTER — OFFICE VISIT CONVERTED (OUTPATIENT)
Dept: PULMONOLOGY | Facility: CLINIC | Age: 54
End: 2019-09-13
Attending: INTERNAL MEDICINE

## 2020-01-15 ENCOUNTER — HOSPITAL ENCOUNTER (OUTPATIENT)
Dept: URGENT CARE | Facility: CLINIC | Age: 55
Discharge: HOME | End: 2020-01-15
Attending: FAMILY MEDICINE

## 2020-01-15 LAB — GLUCOSE BLD-MCNC: 90 MG/DL (ref 65–99)

## 2020-02-11 ENCOUNTER — HOSPITAL ENCOUNTER (OUTPATIENT)
Dept: GENERAL RADIOLOGY | Facility: HOSPITAL | Age: 55
Discharge: HOME | End: 2020-02-11
Attending: NURSE PRACTITIONER

## 2020-09-14 ENCOUNTER — TELEPHONE CONVERTED (OUTPATIENT)
Dept: GASTROENTEROLOGY | Facility: CLINIC | Age: 55
End: 2020-09-14
Attending: NURSE PRACTITIONER

## 2020-10-08 ENCOUNTER — OFFICE VISIT CONVERTED (OUTPATIENT)
Dept: PULMONOLOGY | Facility: CLINIC | Age: 55
End: 2020-10-08
Attending: NURSE PRACTITIONER

## 2020-10-09 ENCOUNTER — HOSPITAL ENCOUNTER (OUTPATIENT)
Dept: GENERAL RADIOLOGY | Facility: HOSPITAL | Age: 55
Discharge: HOME | End: 2020-10-09
Attending: NURSE PRACTITIONER

## 2020-10-09 LAB
CREAT BLD-MCNC: 0.9 MG/DL (ref 0.6–1.4)
GFR SERPLBLD BASED ON 1.73 SQ M-ARVRAT: >60 ML/MIN/{1.73_M2}

## 2020-10-21 ENCOUNTER — HOSPITAL ENCOUNTER (OUTPATIENT)
Dept: PREADMISSION TESTING | Facility: HOSPITAL | Age: 55
Discharge: HOME | End: 2020-10-21
Attending: INTERNAL MEDICINE

## 2020-10-22 LAB — SARS-COV-2 RNA SPEC QL NAA+PROBE: NOT DETECTED

## 2020-10-23 ENCOUNTER — HOSPITAL ENCOUNTER (OUTPATIENT)
Dept: GENERAL RADIOLOGY | Facility: HOSPITAL | Age: 55
Discharge: HOME | End: 2020-10-23
Attending: NURSE PRACTITIONER

## 2020-10-23 LAB
ALBUMIN SERPL-MCNC: 4.1 G/DL (ref 3.5–5)
ALBUMIN/GLOB SERPL: 1.2 {RATIO} (ref 1.4–2.6)
ALP SERPL-CCNC: 156 U/L (ref 53–141)
ALT SERPL-CCNC: 20 U/L (ref 10–40)
AMYLASE SERPL-CCNC: 58 U/L (ref 30–110)
ANION GAP SERPL CALC-SCNC: 18 MMOL/L (ref 8–19)
AST SERPL-CCNC: 20 U/L (ref 15–50)
BASOPHILS # BLD AUTO: 0.1 10*3/UL (ref 0–0.2)
BASOPHILS NFR BLD AUTO: 1 % (ref 0–3)
BILIRUB SERPL-MCNC: 0.22 MG/DL (ref 0.2–1.3)
BUN SERPL-MCNC: 14 MG/DL (ref 5–25)
BUN/CREAT SERPL: 15 {RATIO} (ref 6–20)
CALCIUM SERPL-MCNC: 9.5 MG/DL (ref 8.7–10.4)
CHLORIDE SERPL-SCNC: 103 MMOL/L (ref 99–111)
CONV ABS IMM GRAN: 0.03 10*3/UL (ref 0–0.2)
CONV CO2: 24 MMOL/L (ref 22–32)
CONV IMMATURE GRAN: 0.3 % (ref 0–1.8)
CONV TOTAL PROTEIN: 7.6 G/DL (ref 6.3–8.2)
CREAT UR-MCNC: 0.91 MG/DL (ref 0.5–0.9)
DEPRECATED RDW RBC AUTO: 46.6 FL (ref 36.4–46.3)
EOSINOPHIL # BLD AUTO: 0.32 10*3/UL (ref 0–0.7)
EOSINOPHIL # BLD AUTO: 3.3 % (ref 0–7)
ERYTHROCYTE [DISTWIDTH] IN BLOOD BY AUTOMATED COUNT: 14.5 % (ref 11.7–14.4)
GFR SERPLBLD BASED ON 1.73 SQ M-ARVRAT: >60 ML/MIN/{1.73_M2}
GLOBULIN UR ELPH-MCNC: 3.5 G/DL (ref 2–3.5)
GLUCOSE SERPL-MCNC: 80 MG/DL (ref 65–99)
HCT VFR BLD AUTO: 44.4 % (ref 37–47)
HGB BLD-MCNC: 14 G/DL (ref 12–16)
LIPASE SERPL-CCNC: 54 U/L (ref 5–51)
LYMPHOCYTES # BLD AUTO: 3.11 10*3/UL (ref 1–5)
LYMPHOCYTES NFR BLD AUTO: 32.1 % (ref 20–45)
MCH RBC QN AUTO: 27.7 PG (ref 27–31)
MCHC RBC AUTO-ENTMCNC: 31.5 G/DL (ref 33–37)
MCV RBC AUTO: 87.7 FL (ref 81–99)
MONOCYTES # BLD AUTO: 0.52 10*3/UL (ref 0.2–1.2)
MONOCYTES NFR BLD AUTO: 5.4 % (ref 3–10)
NEUTROPHILS # BLD AUTO: 5.61 10*3/UL (ref 2–8)
NEUTROPHILS NFR BLD AUTO: 57.9 % (ref 30–85)
NRBC CBCN: 0 % (ref 0–0.7)
OSMOLALITY SERPL CALC.SUM OF ELEC: 291 MOSM/KG (ref 273–304)
PLATELET # BLD AUTO: 285 10*3/UL (ref 130–400)
PMV BLD AUTO: 10.4 FL (ref 9.4–12.3)
POTASSIUM SERPL-SCNC: 3.9 MMOL/L (ref 3.5–5.3)
RBC # BLD AUTO: 5.06 10*6/UL (ref 4.2–5.4)
SODIUM SERPL-SCNC: 141 MMOL/L (ref 135–147)
TRIGL SERPL-MCNC: 318 MG/DL (ref 40–150)
WBC # BLD AUTO: 9.69 10*3/UL (ref 4.8–10.8)

## 2020-10-24 LAB — CONV CALCIUM IONIZED: 5.2 MG/DL (ref 4.5–5.6)

## 2020-10-25 LAB
CONV IGG SUBCLASS 2: 569 MG/DL (ref 130–555)
CONV IGG SUBCLASS 3: 48 MG/DL (ref 15–102)
CONV IGG SUBCLASS 4: 45 MG/DL (ref 2–96)
CONV IMMUNOGLOBULIN G (IGG): 1478 MG/DL (ref 586–1602)
IGG SUBCLASS 1: 729 MG/DL (ref 248–810)

## 2020-10-26 ENCOUNTER — HOSPITAL ENCOUNTER (OUTPATIENT)
Dept: GASTROENTEROLOGY | Facility: HOSPITAL | Age: 55
Setting detail: HOSPITAL OUTPATIENT SURGERY
Discharge: HOME | End: 2020-10-26
Attending: INTERNAL MEDICINE

## 2020-10-28 ENCOUNTER — HOSPITAL ENCOUNTER (OUTPATIENT)
Dept: GENERAL RADIOLOGY | Facility: HOSPITAL | Age: 55
Discharge: HOME | End: 2020-10-28
Attending: NURSE PRACTITIONER

## 2020-10-28 LAB — GGT SERPL-CCNC: 37 U/L (ref 7–70)

## 2020-10-29 LAB
CONV HEPATITIS B SURFACE AG W CONFIRMATION RE: NEGATIVE
DEPRECATED MITOCHONDRIA M2 IGG SER-ACNC: <20 UNITS (ref 0–20)
HAV IGM SERPL QL IA: NEGATIVE
HBV CORE IGM SERPL QL IA: NEGATIVE
HCV AB SER DONR QL: 0.1 S/CO RATIO (ref 0–0.9)

## 2021-05-10 NOTE — H&P
History and Physical      Patient Name: Dang Hunt   Patient ID: [id number]   Sex: Female   YOB: 1965    Primary Care Provider: Maria Elena Cannon   Referring Provider: Maria Elena Cannon    Visit Date: September 14, 2020    Provider: ROSHNI Valdivia   Location: [facility] [address]   Location Address: [address] [place] [address]  [id number]   Location Phone: [phone]          History Of Present Illness  TELEHEALTH TELEPHONE VISIT  Dang Hunt is a 55 year old /White female who is presenting for evaluation via telehealth telephone visit. Verbal consent obtained before beginning visit.   Provider spent 12 minutes with the patient during the telehealth visit.   The following staff were present during this visit: AMY Dupree; Gregorio Lopez MA   Past Medical History/ Overview of Patient Symptoms     Pt has not been seen in office before. Dr Esteves did a colonoscopy in 2009 states d/t a recent episode of colitis, scope negative however random bx showed chronic colitis; EGD in 2012 no op note available - reflux esophagitis on path.    Pt states she is having more constipation, more difficult to have BM, + cramping, has seen blood in stool a few times. Stool is thinner. Pt states she hears a lot of noises in stomach. Laxatives OTC help some, states it takes longer than before for them to work.   Pt denies unint wt loss. No issues w HB or indigestion. No vomiting, intermittent nausea.     Pt reports a hx of pancreatitis, no issues since 2012. We could not find any records of this today. No ETOH.    PMH sig for CHF, she has seen Dr Rodriguez states has not needed him recently; sees Dr Solis for asthma, sleeps w 02, +sleep apnea.       Past Medical History  Anxiety; Colitis; Colon polyp; Congestive heart failure; Depression; High blood pressure; High cholesterol; Pancreatitis; Asthma         Past Surgical History  Back surgery; Gallbladder; Hysterectomy; Lung Biopsy(s);  Neck Surgery; Tubal ligation         Medication List  Name Date Started Instructions   Advair Diskus 100-50 mcg/dose inhalation blister with device  inhale 1 puff by inhalation route 2 times per day in the morning and evening approximately 12 hours apart   atorvastatin 40 mg oral tablet  take 1 tablet (40 mg) by oral route once daily   furosemide 40 mg oral tablet  take 1 tablet (40 mg) by oral route once daily   metoprolol tartrate 50 mg oral tablet  take 1 tablet (50 mg) by oral route 2 times per day with meals   montelukast 10 mg oral tablet  take 1 tablet (10 mg) by oral route once daily in the evening   venlafaxine 150 mg oral capsule,extended release 24hr  take 1 capsule (150 mg) by oral route once daily   venlafaxine 75 mg oral capsule,extended release 24hr  take 1 capsule (75 mg) by oral route once daily         Allergy List  NO KNOWN DRUG ALLERGIES         Family Medical History  Family history of colon cancer         Social History  Tobacco (Current every day)             Assessment  · Abdominal pain     789.00/R10.9  · Change in bowel habits     787.99/R19.4  · Blood in stool     578.1/K92.1      Plan  · Orders  o [facility] Pre-Op Covid-19 Screening (19363) - - 09/14/2020  o CT Abdomen and Pelvis with IV Contrast [facility]; suggest Oral Prep (86205) - - 09/14/2020  · Medications  o TriLyte With Flavor Packets 420 gram oral recon soln   SIG: Follow the instructions provided by the ordering providers office   DISP: (1) 4000 ml bottle with 0 refills  Prescribed on 09/14/2020     o Medications have been Reconciled  o Transition of Care or Provider Policy  · Instructions  o Plan Of Care:   o Call the office with any concerns or questions.  o Colonoscopy r/b d/w pt for change in bowel pattern, lower abd pain, anesthesia per IV protocol; pulm clearance DR cabezas            Electronically Signed by: ROSHNI Valdivia -Author on September 14, 2020 04:01:22 PM

## 2021-05-28 VITALS
TEMPERATURE: 98 F | DIASTOLIC BLOOD PRESSURE: 80 MMHG | OXYGEN SATURATION: 93 % | BODY MASS INDEX: 43.59 KG/M2 | SYSTOLIC BLOOD PRESSURE: 104 MMHG | DIASTOLIC BLOOD PRESSURE: 70 MMHG | WEIGHT: 249.19 LBS | HEART RATE: 75 BPM | SYSTOLIC BLOOD PRESSURE: 126 MMHG | TEMPERATURE: 98.2 F | WEIGHT: 255.31 LBS | HEIGHT: 65 IN | HEART RATE: 83 BPM | RESPIRATION RATE: 16 BRPM | BODY MASS INDEX: 41.52 KG/M2 | OXYGEN SATURATION: 98 % | RESPIRATION RATE: 16 BRPM | HEIGHT: 64 IN

## 2021-05-28 VITALS
TEMPERATURE: 98.2 F | HEIGHT: 65 IN | SYSTOLIC BLOOD PRESSURE: 144 MMHG | DIASTOLIC BLOOD PRESSURE: 90 MMHG | RESPIRATION RATE: 14 BRPM | OXYGEN SATURATION: 96 % | WEIGHT: 256 LBS | HEART RATE: 72 BPM | BODY MASS INDEX: 42.65 KG/M2

## 2021-05-28 NOTE — PROGRESS NOTES
Patient: MIKEL RAINES     Acct: [id number]     Report: #[id number]  UNIT #: [id number]     : 1965    Encounter Date:2019  PRIMARY CARE: REBEL ELLIS  ***Signed***  --------------------------------------------------------------------------------------------------------------------  Chief Complaint      Encounter Date      Sep 13, 2019            Primary Care Provider      ROMÁN GILLIAM            Referring Provider      SELF,REFERRED            Patient Complaint      Patient is complaining of      soa follow up            VITALS      Height 5 ft 4 in / 162.56 cm      Weight 255 lbs 5 oz / 115.09396 kg      BSA 2.17 m2      BMI 43.8 kg/m2      Temperature 98.2 F / 36.78 C - Oral      Pulse 83      Respirations 16      Blood Pressure 104/80 Sitting, Right Arm      Pulse Oximetry 93%, room air      Initial Exhaled Nitrous Oxide      Exhaled Nitrous Oxide Results:  32 (17 (16))            HPI      The patient is a 54 year old female with morbid obesity, sleep apnea, diastolic     heart failure and asthma chronic obstructive pulmonary disease overlap syndrome.     She is here for follow up today.             Since her last office visit we tried to stop spiriva and decrease the dose of     advair to 250/50 twice daily but her symptoms are worsening and she had gone up     to 500/50 and is also using spiriva intermittently. She recently was having     worsening, cough sore throat and shortness of breath and has been on prednisone,    cipro and levofloxacin and already had 2 rounds of antibiotics. She is feeling     some better but still has hoarseness of voice and sore throat and shortness of     breath. She has fever or chills, no nausea or vomiting but some chest tightness     and overall does not feel back to normal. She has wheezing intermittently now.     She has not lost any weight since her last office visit. She is trying to eat     healthy but overall she is still having some problems.             ROS      Constitutional:  Complains of: Fatigue; Denies: Fever, Weight gain, Weight loss,    Chills, Insomnia, Other      Respiratory/Breathing:  Complains of: Shortness of air, Wheezing; Denies: Cough,    Hemoptysis, Pleuritic pain, Other      Endocrine:  Denies: Polydipsia, Polyuria, Heat/cold intolerance, Abnorml     menstrual pattern, Diabetes, Other      Eyes:  Denies: Blurred vision, Vision Changes, Other      Ears, nose, mouth, throat:  Complains of: Throat pain, Post Nasal Drip, Nasal     congestion; Denies: Mouth lesions, Thrush, Hoarseness, Allergies/Hay Fever,     Headaches, Recent Head Injury, Nose Bleeding, Neck Stiffness, Thyroid Mass,     Hearing Loss, Ear Fullness, Dry Mouth, Nasal or Sinus Pain, Dry Lips, Nasal     discharge, Other      Cardiovascular:  Denies: Palpitations, Syncope, Claudication, Chest Pain, Wake     up Gasping for air, Leg Swelling, Irregular Heart Rate, Cyanosis, Dyspnea on Ex    ertion, Other      Gastrointestinal:  Denies: Nausea, Constipation, Diarrhea, Abdominal pain,     Vomiting, Difficulty Swallowing, Reflux/Heartburn, Dysphagia, Jaundice,     Bloating, Melena, Bloody stools, Other      Genitourinary:  Denies: Urinary frequency, Incontinence, Hematuria, Urgency,     Nocturia, Dysuria, Testicular problems, Other      Musculoskeletal:  Denies: Joint Pain, Joint Stiffness, Joint Swelling, Myalgias,    Other      Hematologic/lymphatic:  DENIES: Lymphadenopathy, Bruising, Bleeding tendencies,     Other      Neurological:  Denies: Headache, Numbness, Weakness, Seizures, Other      Psychiatric:  Denies: Anxiety, Appropriate Effect, Depression, Other      Sleep:  No: Excessive daytime sleep, Morning Headache?, Snoring, Insomnia?, Stop    breathing at sleep?, Other      Integumentary:  Denies: Rash, Dry skin, Skin Warm to Touch, Other      Immunologic/Allergic:  Complains of: Seasonal allergies; Denies: Latex allergy,     Asthma, Urticaria, Eczema, Other      Immunization  status:  No: Up to date            FAMILY/SOCIAL/MEDICAL HX      Current History      Social History      Patient smoked approximately one pack a day for 25 years      Has no birds in the house      Does not use hot tubs      Is unemployed      Denies illicit drug use      Denies alcohol abuse      Lives with family            Family History      Father with COPD      Mother with chronic bronchitis      Surgical History:  Yes: Bladder Surgery (SCOPE  bladder sling procedure), Bowel     Surgery (COLONOSCOPY), Cholecystectomy (LAP), Orthopedic Surgery (CERVICAL     FUSION 09/08 LUMBAR DISCECTOMY 02/09), Vascular Surgery (CARPAL TUNNEL ), Other     Surgeries; No: AAA Repair, Abdominal Surgery, Angioplasty, Appendectomy, Back     Surgery, Breast Surgery, CABG, Carotid Stenosis, Ear Surgery, Eye Surgery, Head     Surgery, Hernia Surgery, Kidney Surgery, Nose Surgery, Oral Surgery,     Prostatectomy, Rectal Surgery, Spinal Surgery, Testicular Surgery, Throat     Surgery, Valve Replacement      Heart - Family Hx:  Grandparent      Other Family Medical History:  Mother (CHRONIC BRONCHITIS), Father (COPD)      Is Father Still Living?:  Yes      Is Mother Still Living?:  Yes       Family History:  Yes      Social History:  No Tobacco Use, No Alcohol Use, No Recreational Drug use      Smoking status:  Former smoker (1 PPD X 20 Y QUIT 2012)      Anticoagulation Therapy:  No      Antibiotic Prophylaxis:  No      Medical History:  Yes: Arthritis (SPINE), Asthma (INHALER USE), Chronic     Bronchitis/COPD, Congestive Heart Failu, Hemorrhoids/Rectal Prob (ACID REFLUX     ;COLITIS AND PANCREATITIS IN 2009), High Blood Pressure (MED CONTROLS MOST OF     THE TIME), Shortness Of Breath (MEDIASTINIAL LYMPHADENOPATHY, LUNG MASS),     Miscellaneous Medical/oth (HISTOPLASMOSIS); No: Alcoholism, Allergies, Anemia,     Blood Disease, Broken Bones, Cataracts, Chemical Dependency,     Chemotherapy/Cancer, Emphysema, Chronic Liver Disease, Colon  Trouble, Colitis,     Diverticulitis, Deafness or Ringing Ears, Convulsions, Depression, Anxiety,     Bipolar Disorder, Diabetes, Epilepsy, Seizures, Forgetfullness, Glaucoma, Gall     Stones, Gout, Head Injury, Heart Attack, Heart Murmur, Hepatitis, Hiatal Hernia,    High Cholesterol, HIV (Do not ask - volu, Jaundice, Kidney or Bladder Disease,     Kidney Stones, Migrane Headaches, Mitral Valve Prolapse, Night sweats, Phle    bitis, Psychiatric Care, Reflux Disease, Rheumatic Fever, Sexually Transmitted     Dis, Sinus Trouble, Skin Disease/Psoriais/Ecz, Stroke, Thyroid Problem,     Tuberculosis or Pos TB Te      Psychiatric History      none            PREVENTION      Date Influenza Vaccine Given:  Sep 1, 2017      Influenza Vaccine Declined:  No      2 or More Falls Past Year?:  No      Fall Past Year with Injury?:  No      Encouraged to follow-up with:  PCP regarding preventative exams.      Chart initiated by      cash madrigal ma            ALLERGIES/MEDICATIONS      Allergies:        Coded Allergies:             NO KNOWN DRUG ALLERGIES (Verified  Allergy, Unknown, 9/13/19)      Medications    Last Reconciled on 9/13/19 12:33 by GADIEL SOLIS MD      P-Ephed HCl/Fexofenadine HCl 120/60 MG (Allegra-D 12 Hour) 1 Each Tab.er.12h      1 TAB PO BID, #60 TAB.SR.12H 0 Refills         Prov: Gadiel Solis         9/13/19       Furosemide (Furosemide) 40 Mg Tablet      40 MG PO QDAY, #30 TAB 2 Refills         Prov: Gadiel Solis         9/13/19       predniSONE* (Deltasone*) 10 Mg Tablet      10 MG PO ASDIR, #45 TAB 0 Refills         Prov: Gadiel Solis         9/13/19       MDI-Albuterol (Ventolin HFA) 8 Gm Hfa.aer.ad      2 PUFFS INH Q4-6H PRN for DYSPNEA, #1 INH 6 Refills         Prov: Gadiel Solis         9/13/19       MDI-Advair 500/50 (Advair 500/50 Diskus) 1 Each Blst.w.dev      1 PUFF INH RTBID, #1 INH 9 Refills         Prov: Gadiel Solis         9/13/19       Loratadine (Loratadine) 5 Mg/5 Ml Solution      5 MG PO  QDAY, ML         Reported         9/13/19       MDI-Advair 500/50 (Advair 500/50 Diskus) 1 Each Blst.w.dev      1 PUFF INH RTBID, #3 INH 3 Refills         Prov: IrmaGadiel         8/19/19       DULoxetine (Cymbalta) Unknown Strength Capsule.dr [place] QDAY, #30 CAP 0 Refills         Reported         11/27/18       MDI-Albuterol (Ventolin HFA) 8 Gm Hfa.aer.ad      2 PUFFS INH Q4-6H PRN for DYSPNEA, #1 INH 6 Refills         Prov: Gadiel Solis         1/29/18       Cetirizine Hcl (ZyrTEC) 5 Mg Tablet      10 MG PO HS for 30 Days, #90 TAB 3 Refills         Prov: MarloGadiel plunkett         12/5/17       Montelukast Sodium (Singulair*) 10 Mg Tab      10 MG PO HS, #90 TAB 3 Refills         Prov: Gadiel Solis         12/5/17       Tiotropium Bromide (Spiriva Respimat 2.5 mcg/Puff) 4 Gm Mist.inhal      2 PUFFS INH RTQDAY, #3 MDI 3 Refills         Prov: Gadiel Solis         12/5/17       NEB-Albuterol Sulf (Albuterol) 2.5 Mg/3 Ml Vial.neb      2.5 MG INH Q4-6H PRN for SHORTNESS OF BREATH for 30 Days, #120 NEB 3 Refills         Prov: MarloGadiel plunkett         4/10/17       Tiotropium Bromide (Spiriva Respimat 2.5 mcg/Puff) 4 Gm Mist.inhal      2 PUFFS INH RTQDAY, #1 MDI 9 Refills         Prov: Gadiel Solis         3/8/17       Furosemide* (Lasix*) 40 Mg Tablet      40 MG PO BID, #60 TAB 0 Refills         Prov: Gadiel Solis         2/2/17       Atorvastatin Calcium (Lipitor*) 20 Mg Tablet      20 MG PO HS, #30 TAB 0 Refills         Reported         3/18/16      Current Medications      Current Medications Reviewed 9/13/19            EXAM      CONSTITUTIONAL: Pleasant female, obese, in no acute distress, normal conversant.    She has lost some weight.        EYES : Pink conjunctive, no ptosis, PERRL.       ENMT : Nose and ears appear normal, normal dentition, posterior pharyngeal wall     erythema present. Mallampati classification III, some sinus tenderness.       Neck: Nontender, no masses, no thyromegaly, no nodules.      Resp : Bilateral  diminished breath sounds, resonant to percussion bilaterally,     expiratory wheezing heard, no crackles or rhonchi.      CVS  : No carotid bruits, s1s2 nl, RRR, no murmur, rubs or gallop, no peripheral    edema       Chest wall: Normal rise with inspiration, nontender on palpation. Increased     chest girth.        GI   : Abdomen soft, with no masses, no hepatosplenomegaly, no hernias, BS+      MSK  : Normal gait and station, no digital cyanosis or clubbing       Skin : No rashes, ulcerations or lesions, normal turgor and temperature      Neuro: CN II - XII intact, no sensory deficits, DTRs intact and symmetrical, no     motor weakness      Psych: Appropriate affect, A   Vtials      Vitals:             Height 5 ft 4 in / 162.56 cm           Weight 255 lbs 5 oz / 115.62919 kg           BSA 2.17 m2           BMI 43.8 kg/m2           Temperature 98.2 F / 36.78 C - Oral           Pulse 83           Respirations 16           Blood Pressure 104/80 Sitting, Right Arm           Pulse Oximetry 93%, room air            REVIEW      Results Reviewed      PCCS Results Reviewed?:  Yes Prev Lab Results, Yes Prev Radiology Results, Yes     Previous Mecial Records      Radiographic Results                     [facility] Diagnostic Img                PACS RADIOLOGY REPORT            Patient: MIKEL RAINES   Acct: #[id number]   Report: #[id number]            UNIT #: [id number]    DOS: 17 1434      INSURANCE:[facility]   ORDER #:[id number]      LOCATION:[facility]     : 1965            PROVIDERS      ADMITTING:     FAMILY:  NONE,MD         ORDERING:  Gadiel Solis   OTHER:       DICTATING:  Freedom Stoner MD            REQ #:[id number]    CPT CODE:75557   EXAM:CXR2 - CHEST 2V AP PA LAT      REASON FOR EXAM:        REASON FOR VISIT:  ASTHMA/SLEEP APNEA            *******Signed******               PROCEDURE:   CHEST AP/PA AND LATERAL             COMPARISON:   [facility],  CT, CHEST W/ CONTRAST, 3/17/2016, 23:01.     [facility], CR, CHEST PA/AP          INDICATIONS:   WHEEZING, SLEEP APNEA.             FINDINGS:         Postoperative changes lower cervical spine. The heart is prominent in size.     There is no pleural       effusion. There are some degenerative changes of the shoulders. There is density    at the left lung       base that likely represents pericardial fat. The exam is slightly     underpenetrated.             CONCLUSION:         1. No acute disease or significant change compared to previous.              SANDIP LIMON MD             Electronically Signed and Approved By: SANDIP LIMON MD on 3/08/2017 at     15:12                        Until signed, this is an unconfirmed preliminary report that may contain      errors and is subject to change.                                              SCHJO:      D:03/08/17 1512      PFT Results      [id number]  [id number]                                       [facility]                          [employer]                            [place], [place]  [address]               __________________________________________________________________________             Patient Name:                   Attending Physician:      Dang Hunt M.D.             Patient Visit # MR #            Admit Date  Disch Date     Location      [id number]    [id number]      07/27/2016                 [facility] -             Date of Birth      1965      __________________________________________________________________________      0821 - DIAGNOSTIC REPORT             PULMONARY FUNCTION TEST             SPIROMETRY:      Spirometry is normal.      FEV1/FVC is 72%.      FEV1 is 2.29 L, 79% of predicted.      FVC is 3.19 L, 87% of predicted.             BRONCHODILATOR RESPONSE:      There is no significant response to bronchodilator  administration.             LUNG VOLUMES:      Lung volumes show air trapping.      Total lung capacity is 6.09 L, 117% of predicted.      Residual volume is 2.84 L, 154% of predicted.             DIFFUSION:      Diffusion capacity is mildly decreased, 67% of predicted.             FLOW VOLUME LOOP:      Flow volume loop is normal.             CONCLUSION:      Normal spirometry with normal total lung capacity and low diffusion capacity      with air trapping is suggestive of early obstructive airway disease.  Please      correlate clinically.             To be electronically signed in [employer]      [id number] GADIEL SOLIS M.D.             NK:rt      D:  07/31/2016 13:52      T:  08/01/2016 09:37      #[id number]             Until signed, this is an unconfirmed preliminary report that may contain      errors and is subject to change.                   08/09/16 1930  <Electronically signed by Gadiel Solis MD>            Assessment      Notes      New Medications      * Loratadine 5 MG/5 ML SOLUTION: 5 MG PO QDAY      * MDI-Advair 500/50 (Advair 500/50 Diskus) 1 EACH BLST.W.DEV: 1 PUFF INH RTBID       #1      * MDI-Albuterol (Ventolin HFA) 8 GM HFA.AER.AD: 2 PUFFS INH Q4-6H PRN DYSPNEA #1      * predniSONE* (Deltasone*) 10 MG TABLET: 10 MG PO ASDIR #45         Instructions: [id number],30ygc1w,51ttw8l,79jav9r      * Furosemide 40 MG TABLET: 40 MG PO QDAY #30      * P-Ephed HCl/Fexofenadine HCl 120/60 MG (Allegra-D 12 Hour) 1 EACH TAB.ER.12H:       1 TAB PO BID #60      Discontinued Medications      * Ciprofloxacin HCl 500 MG TABLET: 500 MG PO BID #20         Instructions: Take until all gone.      * predniSONE* 20 MG TABLET: 40 MG PO QDAY #10      * Levofloxacin (Levaquin*) 500 MG TABLET: 500 MG PO QDAY 7 Days #7      PLAN:       The patient is a 54 year old female with morbid obesity, sleep apnea, diastolic     heart failure and chronic obstructive pulmonary disease/asthma overlap.             1. Chronic obstructive  pulmonary disease/asthma overlap. Continue advair but     increase dose to 500/50. Her symptoms are worse in the winter and overall her sy    mptoms are not well controlled now. She might need to go back on Spiriva     Respimat. Use albuterol as needed.       2. Obstructive sleep apnea. Continue CPAP at current settings. Use oxygen with     bleed into CPAP machine.       3. Persistent bronchitis and postnasal drip. I will give her a prednisone taper     dose for now for 15 days. I will also give her allegra D to use and I advised     her to hold claritin for now. Continue with Singulair.       4. I have written for Lasix 40 mg once daily for 1 month with 1 refill.       5. I advised her to get flu vaccine when her symptoms are resolved from this     acute event.       6. Follow up with her in 3-4 months, earlier if needed.            Patient Education      Education resources provided:  Yes      Patient Education Provided:  Acute Bronchitis                 Disclaimer: Converted document may not contain table formatting or lab diagrams. Please see [employer] for the authenticated document.

## 2021-05-28 NOTE — PROGRESS NOTES
Patient: MIKEL RAINES     Acct: [id number]     Report: #[id number]  UNIT #: [id number]     : 1965    Encounter Date:2018  PRIMARY CARE: REB[facility] ELL[facility]  ***Signed***  --------------------------------------------------------------------------------------------------------------------  Chief Complaint      Encounter Date      Aug 22, 2018            Primary Care Provider      ROMÁN GILLIAM            Referring Provider      SELF,REFERRED PATIENT            Patient Complaint      Patient is complaining of      f/u copd            VITALS      Height 5 ft 5 in / 165.1 cm      Weight 249 lbs 3 oz / 113.294314 kg      BSA 2.34 m2      BMI 41.5 kg/m2      Temperature 98.0 F / 36.67 C - Oral      Pulse 75      Respirations 16      Blood Pressure 126/70 Sitting, Right Arm      Pulse Oximetry 98%, room air      Exhaled Nitrous Oxide Testin (17 (16))            HPI      The patient is a 52 year old  female with morbid obesity, sleep apnea,     diastolic heart failure and COPD/asthma overlap.  She is here for follow up. She    recently had a UTI two weeks ago and was seen in urgent care.  She was treated     with Bactrim.  She had urine culture sent at that time.  She finished her     Bactrim course a few days ago and continues to have some burning when she pee's,    has chills, has shortness of breath, feels tired all of the time and has lack of    energy. We reviewed the urine culture which shows significant resistance to     Bactrim.  She had bariatric surgery a few months ago and since then she has lost    about 50 pounds.  She continues to use her CPAP and feels like it does help.      She uses nasal mask.  We reviewed the CPAP usage data.  She has been very     compliant with it and uses it for about 7 hours and 50 minutes.  Average     apnea/hypopnea index on it was 4.2 daily with a CPAP pressure of 11 cm of water.     The patient has been on Advair 500/50, but she has been using  it just once a     day.  She also was suppose to be on Spiriva, but has not been using it.  She has    just needed one rescue inhaler over the last month.  She continues to oxygen     with sleep.            ROS      Constitutional:  Complains of: Fatigue; Denies: Fever, Weight gain, Weight loss,    Chills, Insomnia, Other      Respiratory/Breathing:  Complains of: Shortness of air; Denies: Wheezing, Cough,    Hemoptysis, Pleuritic pain, Other      Endocrine:  Denies: Polydipsia, Polyuria, Heat/cold intolerance, Abnorml     menstrual pattern, Diabetes, Other      Eyes:  Denies: Blurred vision, Vision Changes, Other      Ears, nose, mouth, throat:  Denies: Congestion, Dysphagia, Hearing Changes, Nose    Bleeding, Nasal Discharge, Throat pain, Tinnitus, Other      Cardiovascular:  Denies: Chest Pain, Exertional dyspnea, Peripheral Edema,     Palpitations, Syncope, Wake up Gasping for air, Orthopnea, Tachycardia, Other      Gastrointestinal:  Denies: Abdominal pain/cramping, Bloody stools, Constipation,    Diarrhea, Melena, Nausea, Vomiting, Other      Genitourinary:  Denies: Dysuria, Urinary frequency, Incontinence, Hematuria,     Urgency, Other      Musculoskeletal:  Denies: Joint Pain, Joint Stiffness, Joint Swelling, Myalgias,    Other      Hematologic/lymphatic:  DENIES: Lymphadenopathy, Bruising, Bleeding tendencies,     Other      Neurologic:  Denies: Headache, Numbness, Weakness, Seizures, Other      Psychiatric:  Denies: Anxiety, Appropriate Effect, Depression, Other      Sleep:  No: Excessive daytime sleep, Morning Headache?, Snoring, Insomnia?, Stop    breathing at sleep?, Other      Integumentary:  Denies: Rash, Dry skin, Skin Warm to Touch, Other            FAMILY/SOCIAL/MEDICAL HX      Current History      Social History      Patient smoked approximately one pack a day for 25 years      Has no birds in the house      Does not use hot tubs      Is unemployed      Denies illicit drug use      Denies alcohol  abuse      Lives with family            Family History      Father with COPD      Mother with chronic bronchitis      Surgical History:  Yes: Bladder Surgery (SCOPE  bladder sling procedure), Bowel     Surgery (COLONOSCOPY), Cholecystectomy (LAP), Orthopedic Surgery (CERVICAL     FUSION 09/08 LUMBAR DISCECTOMY 02/09), Vascular Surgery (CARPAL TUNNEL ), Other     Surgeries; No: AAA Repair, Abdominal Surgery, Angioplasty, Appendectomy, Back     Surgery, Breast Surgery, CABG, Carotid Stenosis, Ear Surgery, Eye Surgery, Head     Surgery, Hernia Surgery, Kidney Surgery, Nose Surgery, Oral Surgery,     Prostatectomy, Rectal Surgery, Spinal Surgery, Testicular Surgery, Throat     Surgery, Valve Replacement      Heart - Family Hx:  Grandparent      Other Family Medical History:  Mother (CHRONIC BRONCHITIS), Father (COPD)      Is Father Still Living?:  Yes      Is Mother Still Living?:  Yes       Family History:  Yes      Social History:  No Tobacco Use, No Alcohol Use, No Recreational Drug use      Smoking status:  Former smoker (1 PPD X 20 Y QUIT 2012)      Anticoagulation Therapy:  No      Antibiotic Prophylaxis:  No      Medical History:  Yes: Arthritis (SPINE), Asthma (INHALER USE), Chronic     Bronchitis/COPD, Congestive Heart Failu, Hemorrhoids/Rectal Prob (ACID REFLUX     ;COLITIS AND PANCREATITIS IN 2009), High Blood Pressure (MED CONTROLS MOST OF     THE TIME), Shortness Of Breath (MEDIASTINIAL LYMPHADENOPATHY, LUNG MASS),     Miscellaneous Medical/oth (HISTOPLASMOSIS); No: Alcoholism, Allergies, Anemia,     Blood Disease, Broken Bones, Cataracts, Chemical Dependency, Chemot    herapy/Cancer, Emphysema, Chronic Liver Disease, Colon Trouble, Colitis,     Diverticulitis, Deafness or Ringing Ears, Convulsions, Depression, Anxiety,     Bipolar Disorder, Diabetes, Epilepsy, Seizures, Forgetfullness, Glaucoma, Gall     Stones, Gout, Head Injury, Heart Attack, Heart Murmur, Hepatitis, Hiatal Hernia,    High Cholesterol,  HIV (Do not ask - volu, Jaundice, Kidney or Bladder Disease,     Kidney Stones, Migrane Headaches, Mitral Valve Prolapse, Night sweats,     Phlebitis, Psychiatric Care, Reflux Disease, Rheumatic Fever, Sexually     Transmitted Dis, Sinus Trouble, Skin Disease/Psoriais/Ecz, Stroke, Thyroid     Problem, Tuberculosis or Pos TB Te      Psychiatric History      none            PREVENTION      Hx Influenza Vaccination:  Yes      Date Influenza Vaccine Given:  Sep 1, 2017      Influenza Vaccine Declined:  No      2 or More Falls Past Year?:  No      Fall Past Year with Injury?:  No      Hx Pneumococcal Vaccination:  Yes      Encouraged to follow-up with:  PCP regarding preventative exams.      Chart initiated by      cash madrigal/ ma            ALLERGIES/MEDICATIONS      Allergies:        Coded Allergies:             NO KNOWN DRUG ALLERGIES (Verified  Allergy, Unknown, 8/22/18)      Medications    Last Reconciled on 8/22/18 16:59 by PIOTR SOLIS MD MDI-Advair 250/50 (Advair 250/50 Diskus) 1 Each Blst.w.dev      1 PUFF INH RTBID, #1 INH 9 Refills         Prov: Piotr Solis         8/22/18       Ciprofloxacin HCl (Cipro) 500 Mg Tablet      500 MG PO BID, #20 TAB 0 Refills         Prov: Piotr Solis         8/22/18       Phenazopyridine HCl (Pyridium*) 100 Mg Tablet      100 MG PO TID for BURNING, #6 TAB         Prov: Maria De Jesus Abdi wes         8/8/18       Naproxen (Naproxen) 500 Mg Tablet      500 MG PO BID, #20 TAB 0 Refills         Prov: YVONNE SPARROW cfe         6/1/18       MDI-Albuterol (Ventolin HFA*) 8 Gm Hfa.aer.ad      2 PUFFS INH Q4-6H PRN for DYSPNEA, #1 INH 6 Refills         Prov: Piotr Solis         1/29/18       Cetirizine Hcl (ZyrTEC*) 5 Mg Tablet      10 MG PO HS for 30 Days, #90 TAB 3 Refills         Prov: Piotr Solis         12/5/17       Montelukast Sodium (Singulair*) 10 Mg Tab      10 MG PO HS, #90 TAB 3 Refills         Prov: Piotr Solis         12/5/17       Tiotropium Bromide (Spiriva Respimat  2.5 mcg/Puff) 4 Gm Mist.inhal      2 PUFFS INH RTQDAY, #3 MDI 3 Refills         Prov: Gadiel Solis         12/5/17       MDI-Advair 500/50 (Advair 500/50 Diskus) 1 Each Blst.w.dev      1 PUFF INH RTBID, #3 INH 3 Refills         Prov: Gadiel Solis         12/5/17       NEB-Albuterol Sulf (Albuterol) 2.5 Mg/3 Ml Vial.neb      2.5 MG INH Q4-6H PRN for SHORTNESS OF BREATH for 30 Days, #120 NEB 3 Refills         Prov: Gadiel Solis         4/10/17       Tiotropium Bromide (Spiriva Respimat 2.5 mcg/Puff) 4 Gm Mist.inhal      2 PUFFS INH RTQDAY, #1 MDI 9 Refills         Prov: Gadiel Solis         3/8/17       Cetirizine Hcl (ZyrTEC*) 10 Mg Tablet      10 MG PO QDAY, #30 TAB 0 Refills         Reported         3/8/17       Furosemide* (Lasix*) 40 Mg Tablet      40 MG PO BID, #60 TAB 0 Refills         Prov: Gadiel Solis         2/2/17       Montelukast Sodium (Singulair*) 10 Mg Tablet      10 MG PO HS, #30 TAB 0 Refills         Reported         3/18/16       Atorvastatin Calcium (Lipitor*) 20 Mg Tablet      20 MG PO HS, #30 TAB 0 Refills         Reported         3/18/16       FLUoxetine HCl (PROzac) 20 Mg Capsule      20 MG PO QDAY         Reported         3/19/13      Current Medications      Current Medications Reviewed 8/22/18            EXAM      CONSTITUTIONAL: Pleasant female, obese, in no acute distress, normal conversant.    She has lost some weight.        EYES : Pink conjunctive, no ptosis, PERRL.       ENMT : Nose and ears appear normal, normal dentition, posterior pharyngeal wall     erythema present. Mallampati classification III, no sinus tenderness.       Neck: Nontender, no masses, no thyromegaly, no nodules.      Resp : Bilateral diminished breath sounds, resonant to percussion bilaterally,     no wheezing, crackles or rhonchi.      CVS  : No carotid bruits, s1s2 nl, RRR, no murmur, rubs or gallop, no peripheral    edema       Chest wall: Normal rise with inspiration, nontender on palpation. Increased     chest  girth.        GI   : Abdomen soft, with no masses, no hepatosplenomegaly, no hernias, BS+      MSK  : Normal gait and station, no digital cyanosis or clubbing       Skin : No rashes, ulcerations or lesions, normal turgor and temperature      Neuro: CN II - XII intact, no sensory deficits, DTRs intact and symmetrical, no     motor weakness      Psych: Appropriate affect, A   Vitals      Vitals:             Height 5 ft 5 in / 165.1 cm           Weight 249 lbs 3 oz / 113.693919 kg           BSA 2.34 m2           BMI 41.5 kg/m2           Temperature 98.0 F / 36.67 C - Oral           Pulse 75           Respirations 16           Blood Pressure 126/70 Sitting, Right Arm           Pulse Oximetry 98%, room air            REVIEW      Results Reviewed      P[facility]S Results Reviewed?:  Yes Prev Lab Results, Yes Prev Radiology Results, Yes     Previous [facility] Records      Lab Results      The patient's CPAP usage data from 05/24/2018 to 08/21/2018 was reviewed.  On an    average she used it for 7 hours and 45 minutes for total days with a CPAP     pressure of 11 cm of water, apnea/hypopnea index of 4.2 on this.  Her CBC from     01/10/2018 was reviewed which showed a normal CBC and normal renal profile.            Assessment      Notes      New Medications      * Ciprofloxacin HCl (Cipro) 500 MG TABLET: 500 MG PO BID #20      * MDI-Advair 250/50 (Advair 250/50 Diskus) 1 EACH BLST.W.DEV: 1 PUFF INH RTBID       #1      PLAN:       The patient is a 53 year old female with morbid obesity, sleep apnea, diastolic     heart failure and chronic obstructive pulmonary disease/asthma overlap.             1. Chronic obstructive pulmonary disease/asthma. Given that her symptoms are     stable and she is not using Spiriva, I will stop Spiriva now.  I will also     decrease the dose of Advair to 250/50 twice a day.  Continue albuterol as     needed. She was using Advair just once daily. She does not smoke anymore.              2. Obstructive sleep  apnea. Continue CPAP at current settings. Weight loss     counseling was done.  CPAP usage data was reviewed. She has been very compliant     and adherent to it.              3. Since bariatric surgery she has lost 50 pounds, I advised her to continue exe    rcising and trying to lose weight.            4. Chronic hypoxic respiratory failure. Continue oxygen with CPAP.              5. UTI.  Given the resistance of E. Coli to Bactrim, I will start her on Cipro     for 5-10 days.              6. Follow up with her in 4-6 months, earlier if needed.            Patient Education      Education resources provided:  Yes      Patient Education Provided:  Acute Asthma, Acute Bronchitis                 Disclaimer: Converted document may not contain table formatting or lab diagrams. Please see [employer] [facility] for the authenticated document.

## 2021-05-28 NOTE — PROGRESS NOTES
Patient: MIKEL RAINES     Acct: [id number]     Report: #[id number]  UNIT #: [id number]     : 1965    Encounter Date:10/08/2020  PRIMARY CARE: REBEL ELLIS  ***Signed***  --------------------------------------------------------------------------------------------------------------------  Chief Complaint      Encounter Date      Oct 8, 2020            Primary Care Provider      ROMÁN GILLIAM            Patient Complaint      Patient is complaining of      PT here today for F/U, needs surgery clearance for Colonoscopy, COPD            VITALS      Height 5 ft 5 in / 165.1 cm      Weight 256 lbs  / 116.916819 kg      BSA 2.20 m2      BMI 42.6 kg/m2      Temperature 98.2 F / 36.78 C - Temporal      Pulse 72      Respirations 14      Blood Pressure 144/90 Sitting, Left Arm      Pulse Oximetry 96%, room air            HPI      The patient is a 55 year old female patient of Dr. Solis's with a history of     morbid obesity, sleep apnea, diastolic heart failure and asthma/COPD overlap     syndrome who presents for follow up visit today.  The patient states she is     scheduled to have colonoscopy by Dr. Esteves and is needing surgical clearance to    have the colonoscopy completed.  The patient states that since last visit her     breathing is doing well.  The patient states she has taken Advair and Spiriva     everyday as prescribed and only has to use her albuterol inhaler about once a     month. The patient states she is also wearing a CPAP machine everynight. The     patient states that her CPAP machine helps her to be able to sleep at night and     no longer has morning headaches and excessive daytime sleepiness has     significantly improved since starting CPAP. The patient states the only surgery     she had since last visit was left knee surgery in [place] for a meniscus     tear about three months ago.  The patient currently denies any increase in     cough, dyspnea or wheezing. The patient also  denies any fever, chills, night     sweats, hemoptysis, purulent sputum production, swollen glands in the head and     neck,  chest pain, chest tightness, abdominal pain, nausea, vomiting, diarrhea.     The patient denies any headaches, myalgias, sore throat, changes in sense of     taste and/or smell or any other coronavirus or flu-like symptoms.  The patient     states she does have a history of congestive heart failure and is under the care    of Dr. Rodriguez and is needing an updated referral to go back and seen him.  The     patient states she does have intermittent swelling at times and is taking Lasix     that helps with her leg swelling.  The patient denies any paroxysmal nocturnal     dyspnea, orthopnea or nocturnal awakenings. The patient states she is able to     perform all ADLs without difficulty. The patient states that she smokes about     one pack per week of cigarettes and she will work on quitting on her own.              I have personally reviewed the review of systems, past family, social, surgical     and medical histories and I agree with those as entered in the chart.      Copies To:   Gadiel Solis      Constitutional:  Denies: Fatigue, Fever, Weight gain, Weight loss, Chills,     Insomnia, Other      Respiratory/Breathing:  Complains of: Shortness of air; Denies: Wheezing, Cough,    Hemoptysis, Pleuritic pain, Other      Endocrine:  Denies: Polydipsia, Polyuria, Heat/cold intolerance, Abnorml     menstrual pattern, Diabetes, Other      Eyes:  Denies: Blurred vision, Vision Changes, Other      Ears, nose, mouth, throat:  Denies: Congestion, Dysphagia, Hearing Changes, Nose    Bleeding, Nasal Discharge, Throat pain, Tinnitus, Other      Cardiovascular:  Denies: Chest Pain, Exertional dyspnea, Peripheral Edema,     Palpitations, Syncope, Wake up Gasping for air, Orthopnea, Tachycardia, Other      Gastrointestinal:  Denies: Abdominal pain/cramping, Bloody stools, Constipation,     Diarrhea, Melena, Nausea, Vomiting, Other      Genitourinary:  Denies: Dysuria, Urinary frequency, Incontinence, Hematuria,     Urgency, Other      Musculoskeletal:  Denies: Joint Pain, Joint Stiffness, Joint Swelling, Myalgias,    Other      Hematologic/lymphatic:  DENIES: Lymphadenopathy, Bruising, Bleeding tendencies,     Other      Neurologic:  Denies: Headache, Numbness, Weakness, Seizures, Other      Psychiatric:  Denies: Anxiety, Appropriate Effect, Depression, Other      Sleep:  No: Excessive daytime sleep, Morning Headache?, Snoring, Insomnia?, Stop    breathing at sleep?, Other      Integumentary:  Denies: Rash, Dry skin, Skin Warm to Touch, Other            FAMILY/SOCIAL/MEDICAL HX      Surgical History:  Yes: Bladder Surgery (SCOPE  bladder sling procedure), Bowel     Surgery (COLONOSCOPY), Cholecystectomy (LAP), Orthopedic Surgery (CERVICAL     FUSION 09/08 LUMBAR DISCECTOMY 02/09), Vascular Surgery (CARPAL TUNNEL ), Other     Surgeries; No: AAA Repair, Abdominal Surgery, Angioplasty, Appendectomy, Back     Surgery, Breast Surgery, CABG, Carotid Stenosis, Ear Surgery, Eye Surgery, Head     Surgery, Hernia Surgery, Kidney Surgery, Nose Surgery, Oral Surgery,     Prostatectomy, Rectal Surgery, Spinal Surgery, Testicular Surgery, Throat     Surgery, Valve Replacement      Heart - Family Hx:  Grandparent      Other Family Medical History:  Mother, Father      Smoking status:  Former smoker ((1 PPD X 20 Y QUIT 2012))      Anticoagulation Therapy:  No      Antibiotic Prophylaxis:  No      Medical History:  Yes: Arthritis (SPINE), Asthma (INHALER USE), Chronic     Bronchitis/COPD, Congestive Heart Failu, Hemorrhoids/Rectal Prob (ACID REFLUX     ;COLITIS AND PANCREATITIS IN 2009), High Blood Pressure (MED CONTROLS MOST OF     THE TIME), Shortness Of Breath (MEDIASTINIAL LYMPHADENOPATHY, LUNG MASS),     Miscellaneous Medical/oth (HISTOPLASMOSIS); No: Anemia, Blood Disease, Broken     Bones, Cataracts, Chemical  Dependency, Chemotherapy/Cancer, Emphysema, Chronic     Liver Disease, Colon Trouble, Colitis, Diverticulitis, Deafness or Ringing Ears,    Depression, Anxiety, Bipolar Disorder, Diabetes, Epilepsy, Seizures, Glaucoma,     Gall Stones, Gout, Head Injury, Heart Attack, Heart Murmur, Hepatitis, Hiatal     Hernia, HIV (Do not ask - volu, Kidney or Bladder Disease, Kidney Stones,     Migrane Headaches, Mitral Valve Prolapse, Psychiatric Care, Reflux Disease,     Rheumatic Fever, Sexually Transmitted Dis, Sinus Trouble, Skin Dise    ase/Psoriais/Ecz, Stroke, Thyroid Problem, Tuberculosis or Pos TB Te      Psychiatric History      none            PREVENTION      Hx Influenza Vaccination:  Yes      Date Influenza Vaccine Given:  Oct 1, 2020      Influenza Vaccine Declined:  No      2 or More Falls in Past Year?:  No      Fall Past Year with Injury?:  No      Hx Pneumococcal Vaccination:  Yes      Encouraged to follow-up with:  PCP regarding preventative exams.      Chart initiated by      Shanti Boyce CMA            ALLERGIES/MEDICATIONS      Allergies:        Coded Allergies:             NO KNOWN DRUG ALLERGIES (Verified  Allergy, Unknown, 10/8/20)      Medications    Last Reconciled on 10/8/20 09:38 by SANDRA HALL       Furosemide* (Lasix*) 40 Mg Tablet      40 MG PO once daily, #60 TAB 0 Refills         Prov: SANDRA HALL [facility]         10/8/20       MDI-Advair 500/50 (Advair 500/50 Diskus) 1 Each Blst.w.dev      1 PUFF INH RTBID, #1 INH 9 Refills         Prov: SANDRA HALL [facility]         10/8/20       MDI-Albuterol (Ventolin HFA) 8 Gm Hfa.aer.ad      2 PUFFS INH Q4-6H PRN for DYSPNEA, #1 INH 6 Refills         Prov: SANDRA HALL [facility]         10/8/20       Montelukast Sodium (Singulair*) 10 Mg Tab      10 MG PO HS, #90 TAB 3 Refills         Prov: SANDRA HALL [facility]         10/8/20       NEB-Albuterol Sulf (Albuterol) 2.5 Mg/3 Ml Vial.neb      2.5 MG INH Q4-6H PRN for SHORTNESS OF BREATH for 30 Days,  #120 NEB 3 Refills         Prov: SANDRA HALL [facility]         10/8/20       Tiotropium Bromide (Spiriva Respimat 2.5 mcg/Puff) 4 Gm Mist.inhal      2 PUFFS INH RTQDAY, #1 MDI 9 Refills         Prov: SANDRA HALL [facility]         10/8/20       Metoprolol Succinate (Metoprolol Succinate) 50 Mg Tab.er.24h      50 MG PO QDAY, #30 TAB.SR.24H 0 Refills         Reported         10/8/20       Tiotropium Bromide (Spiriva Respimat 2.5 mcg/Puff) 4 Gm Mist.inhal      2 PUFFS INH RTQDAY, #3 MDI 3 Refills         Prov: Gadiel Solis         12/5/17       Atorvastatin Calcium (Lipitor*) 20 Mg Tablet      20 MG PO HS, #30 TAB 0 Refills         Reported         3/18/16      Current Medications      Current Medications Reviewed 10/8/20            EXAM      CONSTITUTIONAL: Pleasant  normal conversant.       EYES : Pink conjunctive, no ptosis, PERRL.       ENMT : Nose and ears appear normal, normal dentition, posterior pharyngeal wall     is without erythema, no sinus tenderness. Mallampati classification 3.        Neck: Nontender, no masses, no thyromegaly, no nodules.      Resp : Mildly decreased breath sounds throughout, no wheezes, rales or rhonchi     appreciated, normal work of breathing noted.  Patient is able to speak full     sentences without difficulty.        CVS  : No carotid bruits, s1s2 nl, RRR, no murmur, rubs or gallop, no peripheral    edema       Chest wall: Normal rise with inspiration, nontender on palpation.      GI   : Abdomen soft, with no masses, no hepatosplenomegaly, no hernias, BS+      MSK  : Normal gait and station, no digital cyanosis or clubbing       Skin : No rashes, ulcerations or lesions, normal turgor and temperature      Neuro: CN II - XII intact, no sensory deficits, DTRs intact and symmetrical, no     motor weakness      Psych: Appropriate affect, A   Vitals      Vitals:             Height 5 ft 5 in / 165.1 cm           Weight 256 lbs  / 116.844321 kg           BSA 2.20 m2           BMI 42.6 kg/m2            Temperature 98.2 F / 36.78 C - Temporal           Pulse 72           Respirations 14           Blood Pressure 144/90 Sitting, Left Arm           Pulse Oximetry 96%, room air            REVIEW      Results Reviewed      [facility] Results Reviewed?:  Yes Prev Lab Results, Yes Prev Radiology Results, Yes     Previous [facility] Records      Lab Results      I reviewed Dr. Solis's last office visit note.            Assessment      CHF (congestive heart failure) - I50.9            Notes      New Medications      * Metoprolol Succinate 50 MG TAB.ER.24H: 50 MG PO QDAY #30      Renewed Medications      * TIOTROPIUM BROMIDE (Spiriva Respimat 2.5 mcg/Puff) 4 GM MIST.INHAL: 2 PUFFS       INH RTQDAY #1      * NEB-Albuterol Sulf (Albuterol) 2.5 MG/3 ML VIAL.NEB: 2.5 MG INH Q4-6H PRN       SHORTNESS OF BREATH 30 Days #120         Instructions: DIAGNOSIS CODE REQUIRED PRIOR TO PRESCRIBING.      * Montelukast Sodium (Singulair*) 10 MG TAB: 10 MG PO HS #90      * MDI-Albuterol (Ventolin HFA) 8 GM HFA.AER.AD: 2 PUFFS INH Q4-6H PRN DYSPNEA #1      * MDI-Advair 500/50 (Advair 500/50 Diskus) 1 EACH BLST.W.DEV: 1 PUFF INH RTBID       #1      Changed Medications      * Furosemide* (Lasix*) 40 MG TABLET:         From: 40 MG PO BID #60         To: 40 MG PO once daily #60      Discontinued Medications      * CETIRIZINE HCL (zyrTEC) 5 MG TABLET: 10 MG PO HS 30 Days #90      * MDI-Advair 500/50 (Advair 500/50 Diskus) 1 EACH BLST.W.DEV: 1 PUFF INH RTBID       #3      New Office Procedures      * Flu Vacc Fluarix Quadrivalent, Routine         Flu Vacc (6Mos Up)/Pf (Fluarix Quadrivalent Syringe) 60 MCG/0.5 ML SYRINGE:        60 MICROGRAM INTRAMUSCULARLY Qty 1 SYRINGE      New Referrals      * Cardiology, Routine         Dx: CHF (congestive heart failure) - I50.9      IMPRESSION:      1.  Obstructive sleep apnea, patient on nightly CPAP.      2. Diastolic heart failure, patient is under the care of Dr. Rodriguez.      3. COPD with asthma overlap  syndrome.      4. Tobacco abuse with cigarettes.               PLAN:      1.  The patient to continue Advair and Spiriva everyday as prescribed and rinse     her mouth out after each use of Advair.      2.  The patient to continue albuterol inhaler and nebulizer treatments as     needed.      3. For obstructive sleep apnea, I will request a copy of patient's CPAP     compliance report. The patient is advised to continue her CPAP at current     settings and to clean mask and tubing daily.  I advised patient that if I need     to change her settings, I will notify her.      4. I spent three minutes today counseling the patient on smoking cessation.  I     counseled the patient on the risks of continued smoking including the risk of     lung cancer, head and neck cancer, renal cancer, heart disease, stroke, and     early death. The patient states that she will try to quit on her own.  The     patient is advised to decrease the number of cigarettes she is smoking up to the    point where she can quit.      5. The patient is to continue oxygen at night.      6. Flu vaccine given to the patient in the office today.      7.  Patient is advised to call the office, 911 or go to the ER with any new or     worsening symptoms.      8.  The patient has morbid obesity, obstructive sleep apnea and chronic     obstructive pulmonary disease. The patient is at moderate risk for perioperative    pulmonary complications, but overall she is functionally active, so should not     have issues with colonoscopy.  Preoperative bronchodilators via nebulizer,      incentive spirometry and early ambulation, etc would help.  The patient     essentially cleared to have colonoscopy.      9. For congestive heart failure, the patient is needing updated referral to go     back to see Dr. Rodriguez, referral placed today. The patient is to continue Lasix 40    mg daily and check daily weight.      10. The patient to follow up in 4-6 months, sooner if  needed.            Patient Education      Tobacco Cessation Counseling:  for 3 - 10 minutes      Patient Education Provided:  Acute Asthma, COPD, Smoking Cessation      Time Spent:  > 50% /Coord Care            Electronically signed by SANDRA HALL [facility]  10/09/2020 11:55       Disclaimer: Converted document may not contain table formatting or lab diagrams. Please see [facility] for the authenticated document.

## 2021-08-05 ENCOUNTER — HOSPITAL ENCOUNTER (INPATIENT)
Facility: HOSPITAL | Age: 56
LOS: 7 days | Discharge: HOME | End: 2021-08-13
Attending: EMERGENCY MEDICINE | Admitting: HOSPITALIST

## 2021-08-05 ENCOUNTER — APPOINTMENT (OUTPATIENT)
Dept: GENERAL RADIOLOGY | Facility: HOSPITAL | Age: 56
End: 2021-08-05

## 2021-08-05 DIAGNOSIS — U07.1 PNEUMONIA DUE TO COVID-19 VIRUS: ICD-10-CM

## 2021-08-05 DIAGNOSIS — J12.82 PNEUMONIA DUE TO COVID-19 VIRUS: ICD-10-CM

## 2021-08-05 DIAGNOSIS — J96.01 ACUTE RESPIRATORY FAILURE WITH HYPOXIA (HCC): Primary | ICD-10-CM

## 2021-08-05 LAB
ALBUMIN SERPL-MCNC: 3.4 G/DL (ref 3.5–5.2)
ALBUMIN/GLOB SERPL: 0.9 G/DL
ALP SERPL-CCNC: 119 U/L (ref 39–117)
ALT SERPL W P-5'-P-CCNC: 25 U/L (ref 1–33)
ANION GAP SERPL CALCULATED.3IONS-SCNC: 14.2 MMOL/L (ref 5–15)
AST SERPL-CCNC: 42 U/L (ref 1–32)
BASOPHILS # BLD AUTO: 0.01 10*3/MM3 (ref 0–0.2)
BASOPHILS NFR BLD AUTO: 0.3 % (ref 0–1.5)
BILIRUB SERPL-MCNC: 0.3 MG/DL (ref 0–1.2)
BUN SERPL-MCNC: 13 MG/DL (ref 6–20)
BUN/CREAT SERPL: 12.9 (ref 7–25)
CALCIUM SPEC-SCNC: 8.2 MG/DL (ref 8.6–10.5)
CHLORIDE SERPL-SCNC: 98 MMOL/L (ref 98–107)
CO2 SERPL-SCNC: 19.8 MMOL/L (ref 22–29)
CREAT SERPL-MCNC: 1.01 MG/DL (ref 0.57–1)
D-LACTATE SERPL-SCNC: 1.4 MMOL/L (ref 0.5–2)
DEPRECATED RDW RBC AUTO: 45 FL (ref 37–54)
EOSINOPHIL # BLD AUTO: 0 10*3/MM3 (ref 0–0.4)
EOSINOPHIL NFR BLD AUTO: 0 % (ref 0.3–6.2)
ERYTHROCYTE [DISTWIDTH] IN BLOOD BY AUTOMATED COUNT: 14.9 % (ref 12.3–15.4)
GFR SERPL CREATININE-BSD FRML MDRD: 57 ML/MIN/1.73
GLOBULIN UR ELPH-MCNC: 3.7 GM/DL
GLUCOSE SERPL-MCNC: 121 MG/DL (ref 65–99)
HCT VFR BLD AUTO: 39.1 % (ref 34–46.6)
HGB BLD-MCNC: 13.2 G/DL (ref 12–15.9)
HOLD SPECIMEN: NORMAL
HOLD SPECIMEN: NORMAL
IMM GRANULOCYTES # BLD AUTO: 0.01 10*3/MM3 (ref 0–0.05)
IMM GRANULOCYTES NFR BLD AUTO: 0.3 % (ref 0–0.5)
LYMPHOCYTES # BLD AUTO: 0.75 10*3/MM3 (ref 0.7–3.1)
LYMPHOCYTES NFR BLD AUTO: 21.4 % (ref 19.6–45.3)
MCH RBC QN AUTO: 28.1 PG (ref 26.6–33)
MCHC RBC AUTO-ENTMCNC: 33.8 G/DL (ref 31.5–35.7)
MCV RBC AUTO: 83.4 FL (ref 79–97)
MONOCYTES # BLD AUTO: 0.11 10*3/MM3 (ref 0.1–0.9)
MONOCYTES NFR BLD AUTO: 3.1 % (ref 5–12)
NEUTROPHILS NFR BLD AUTO: 2.63 10*3/MM3 (ref 1.7–7)
NEUTROPHILS NFR BLD AUTO: 74.9 % (ref 42.7–76)
NRBC BLD AUTO-RTO: 0 /100 WBC (ref 0–0.2)
PLATELET # BLD AUTO: 119 10*3/MM3 (ref 140–450)
PMV BLD AUTO: 10.1 FL (ref 6–12)
POTASSIUM SERPL-SCNC: 3.5 MMOL/L (ref 3.5–5.2)
PROT SERPL-MCNC: 7.1 G/DL (ref 6–8.5)
RBC # BLD AUTO: 4.69 10*6/MM3 (ref 3.77–5.28)
SODIUM SERPL-SCNC: 132 MMOL/L (ref 136–145)
WBC # BLD AUTO: 3.51 10*3/MM3 (ref 3.4–10.8)
WHOLE BLOOD HOLD SPECIMEN: NORMAL

## 2021-08-05 PROCEDURE — 83605 ASSAY OF LACTIC ACID: CPT

## 2021-08-05 PROCEDURE — 99285 EMERGENCY DEPT VISIT HI MDM: CPT

## 2021-08-05 PROCEDURE — 85025 COMPLETE CBC W/AUTO DIFF WBC: CPT

## 2021-08-05 PROCEDURE — 87040 BLOOD CULTURE FOR BACTERIA: CPT

## 2021-08-05 PROCEDURE — 99223 1ST HOSP IP/OBS HIGH 75: CPT | Performed by: GENERAL PRACTICE

## 2021-08-05 PROCEDURE — 71045 X-RAY EXAM CHEST 1 VIEW: CPT

## 2021-08-05 PROCEDURE — 25010000002 ONDANSETRON PER 1 MG: Performed by: EMERGENCY MEDICINE

## 2021-08-05 PROCEDURE — 80053 COMPREHEN METABOLIC PANEL: CPT

## 2021-08-05 RX ORDER — ONDANSETRON 2 MG/ML
4 INJECTION INTRAMUSCULAR; INTRAVENOUS ONCE
Status: COMPLETED | OUTPATIENT
Start: 2021-08-05 | End: 2021-08-05

## 2021-08-05 RX ORDER — ACETAMINOPHEN 325 MG/1
975 TABLET ORAL ONCE
Status: COMPLETED | OUTPATIENT
Start: 2021-08-05 | End: 2021-08-05

## 2021-08-05 RX ORDER — DEXAMETHASONE SODIUM PHOSPHATE 10 MG/ML
10 INJECTION INTRAMUSCULAR; INTRAVENOUS ONCE
Status: COMPLETED | OUTPATIENT
Start: 2021-08-05 | End: 2021-08-06

## 2021-08-05 RX ORDER — SODIUM CHLORIDE 0.9 % (FLUSH) 0.9 %
10 SYRINGE (ML) INJECTION AS NEEDED
Status: DISCONTINUED | OUTPATIENT
Start: 2021-08-05 | End: 2021-08-13 | Stop reason: HOSPADM

## 2021-08-05 RX ADMIN — ACETAMINOPHEN 975 MG: 325 TABLET ORAL at 22:39

## 2021-08-05 RX ADMIN — ONDANSETRON 4 MG: 2 INJECTION INTRAMUSCULAR; INTRAVENOUS at 22:40

## 2021-08-06 PROBLEM — J96.01 ACUTE RESPIRATORY FAILURE WITH HYPOXIA: Status: ACTIVE | Noted: 2021-08-06

## 2021-08-06 LAB
CRP SERPL-MCNC: 7.57 MG/DL (ref 0–0.5)
D DIMER PPP FEU-MCNC: 0.72 MG/L (FEU) (ref 0–0.59)
FERRITIN SERPL-MCNC: 360.9 NG/ML (ref 13–150)
GLUCOSE BLDC GLUCOMTR-MCNC: 158 MG/DL (ref 70–99)
GLUCOSE BLDC GLUCOMTR-MCNC: 218 MG/DL (ref 70–99)
HBA1C MFR BLD: 5.88 % (ref 4.8–5.6)
L PNEUMO1 AG UR QL IA: NEGATIVE
MRSA DNA SPEC QL NAA+PROBE: NORMAL
PROCALCITONIN SERPL-MCNC: 0.09 NG/ML (ref 0–0.25)
S PNEUM AG SPEC QL LA: NEGATIVE
SARS-COV-2 RNA RESP QL NAA+PROBE: DETECTED

## 2021-08-06 PROCEDURE — 87899 AGENT NOS ASSAY W/OPTIC: CPT | Performed by: PHYSICIAN ASSISTANT

## 2021-08-06 PROCEDURE — 87040 BLOOD CULTURE FOR BACTERIA: CPT

## 2021-08-06 PROCEDURE — 87641 MR-STAPH DNA AMP PROBE: CPT | Performed by: INTERNAL MEDICINE

## 2021-08-06 PROCEDURE — 94799 UNLISTED PULMONARY SVC/PX: CPT

## 2021-08-06 PROCEDURE — 83036 HEMOGLOBIN GLYCOSYLATED A1C: CPT | Performed by: INTERNAL MEDICINE

## 2021-08-06 PROCEDURE — 36415 COLL VENOUS BLD VENIPUNCTURE: CPT | Performed by: PHYSICIAN ASSISTANT

## 2021-08-06 PROCEDURE — 25010000002 AZITHROMYCIN PER 500 MG: Performed by: PHYSICIAN ASSISTANT

## 2021-08-06 PROCEDURE — 94640 AIRWAY INHALATION TREATMENT: CPT

## 2021-08-06 PROCEDURE — XW033E5 INTRODUCTION OF REMDESIVIR ANTI-INFECTIVE INTO PERIPHERAL VEIN, PERCUTANEOUS APPROACH, NEW TECHNOLOGY GROUP 5: ICD-10-PCS | Performed by: PHYSICIAN ASSISTANT

## 2021-08-06 PROCEDURE — 25010000002 FUROSEMIDE PER 20 MG: Performed by: INTERNAL MEDICINE

## 2021-08-06 PROCEDURE — 63710000001 DEXAMETHASONE PER 0.25 MG: Performed by: PHYSICIAN ASSISTANT

## 2021-08-06 PROCEDURE — 94760 N-INVAS EAR/PLS OXIMETRY 1: CPT

## 2021-08-06 PROCEDURE — U0003 INFECTIOUS AGENT DETECTION BY NUCLEIC ACID (DNA OR RNA); SEVERE ACUTE RESPIRATORY SYNDROME CORONAVIRUS 2 (SARS-COV-2) (CORONAVIRUS DISEASE [COVID-19]), AMPLIFIED PROBE TECHNIQUE, MAKING USE OF HIGH THROUGHPUT TECHNOLOGIES AS DESCRIBED BY CMS-2020-01-R: HCPCS | Performed by: EMERGENCY MEDICINE

## 2021-08-06 PROCEDURE — 25010000002 DEXAMETHASONE PER 1 MG: Performed by: EMERGENCY MEDICINE

## 2021-08-06 PROCEDURE — 86140 C-REACTIVE PROTEIN: CPT | Performed by: PHYSICIAN ASSISTANT

## 2021-08-06 PROCEDURE — 85379 FIBRIN DEGRADATION QUANT: CPT | Performed by: PHYSICIAN ASSISTANT

## 2021-08-06 PROCEDURE — 82962 GLUCOSE BLOOD TEST: CPT

## 2021-08-06 PROCEDURE — 84145 PROCALCITONIN (PCT): CPT | Performed by: PHYSICIAN ASSISTANT

## 2021-08-06 PROCEDURE — 25010000002 CEFTRIAXONE PER 250 MG: Performed by: PHYSICIAN ASSISTANT

## 2021-08-06 PROCEDURE — 25010000002 ENOXAPARIN PER 10 MG: Performed by: INTERNAL MEDICINE

## 2021-08-06 PROCEDURE — 25010000002 AZITHROMYCIN PER 500 MG: Performed by: EMERGENCY MEDICINE

## 2021-08-06 PROCEDURE — 63710000001 INSULIN LISPRO (HUMAN) PER 5 UNITS: Performed by: INTERNAL MEDICINE

## 2021-08-06 PROCEDURE — 82728 ASSAY OF FERRITIN: CPT | Performed by: PHYSICIAN ASSISTANT

## 2021-08-06 PROCEDURE — 25010000002 CEFTRIAXONE PER 250 MG: Performed by: EMERGENCY MEDICINE

## 2021-08-06 RX ORDER — ARFORMOTEROL TARTRATE 15 UG/2ML
15 SOLUTION RESPIRATORY (INHALATION)
Status: DISCONTINUED | OUTPATIENT
Start: 2021-08-06 | End: 2021-08-13

## 2021-08-06 RX ORDER — POTASSIUM CHLORIDE 750 MG/1
40 CAPSULE, EXTENDED RELEASE ORAL DAILY
Status: DISCONTINUED | OUTPATIENT
Start: 2021-08-06 | End: 2021-08-13 | Stop reason: HOSPADM

## 2021-08-06 RX ORDER — BUDESONIDE AND FORMOTEROL FUMARATE DIHYDRATE 80; 4.5 UG/1; UG/1
2 AEROSOL RESPIRATORY (INHALATION)
Status: DISCONTINUED | OUTPATIENT
Start: 2021-08-06 | End: 2021-08-06

## 2021-08-06 RX ORDER — NICOTINE POLACRILEX 4 MG
15 LOZENGE BUCCAL
Status: DISCONTINUED | OUTPATIENT
Start: 2021-08-06 | End: 2021-08-08

## 2021-08-06 RX ORDER — MONTELUKAST SODIUM 10 MG/1
10 TABLET ORAL EVERY MORNING
Status: DISCONTINUED | OUTPATIENT
Start: 2021-08-06 | End: 2021-08-13 | Stop reason: HOSPADM

## 2021-08-06 RX ORDER — BUDESONIDE 0.5 MG/2ML
0.5 INHALANT ORAL
Status: DISCONTINUED | OUTPATIENT
Start: 2021-08-06 | End: 2021-08-13

## 2021-08-06 RX ORDER — ATORVASTATIN CALCIUM 20 MG/1
20 TABLET, FILM COATED ORAL NIGHTLY
Status: DISCONTINUED | OUTPATIENT
Start: 2021-08-06 | End: 2021-08-13 | Stop reason: HOSPADM

## 2021-08-06 RX ORDER — FUROSEMIDE 10 MG/ML
20 INJECTION INTRAMUSCULAR; INTRAVENOUS DAILY
Status: DISCONTINUED | OUTPATIENT
Start: 2021-08-06 | End: 2021-08-09

## 2021-08-06 RX ORDER — DEXAMETHASONE SODIUM PHOSPHATE 10 MG/ML
10 INJECTION INTRAMUSCULAR; INTRAVENOUS DAILY
Status: DISCONTINUED | OUTPATIENT
Start: 2021-08-06 | End: 2021-08-09

## 2021-08-06 RX ORDER — IPRATROPIUM BROMIDE AND ALBUTEROL SULFATE 2.5; .5 MG/3ML; MG/3ML
3 SOLUTION RESPIRATORY (INHALATION)
Status: DISCONTINUED | OUTPATIENT
Start: 2021-08-06 | End: 2021-08-09

## 2021-08-06 RX ORDER — DEXAMETHASONE SODIUM PHOSPHATE 10 MG/ML
6 INJECTION INTRAMUSCULAR; INTRAVENOUS DAILY
Status: DISCONTINUED | OUTPATIENT
Start: 2021-08-06 | End: 2021-08-06

## 2021-08-06 RX ORDER — GUAIFENESIN/DEXTROMETHORPHAN 100-10MG/5
5 SYRUP ORAL EVERY 4 HOURS PRN
Status: DISCONTINUED | OUTPATIENT
Start: 2021-08-06 | End: 2021-08-13 | Stop reason: HOSPADM

## 2021-08-06 RX ORDER — FAMOTIDINE 20 MG/1
20 TABLET, FILM COATED ORAL
Status: DISCONTINUED | OUTPATIENT
Start: 2021-08-06 | End: 2021-08-13 | Stop reason: HOSPADM

## 2021-08-06 RX ORDER — VENLAFAXINE 25 MG/1
50 TABLET ORAL 2 TIMES DAILY WITH MEALS
Status: DISCONTINUED | OUTPATIENT
Start: 2021-08-06 | End: 2021-08-07

## 2021-08-06 RX ORDER — DEXTROSE MONOHYDRATE 100 MG/ML
25 INJECTION, SOLUTION INTRAVENOUS
Status: DISCONTINUED | OUTPATIENT
Start: 2021-08-06 | End: 2021-08-08

## 2021-08-06 RX ADMIN — ATORVASTATIN CALCIUM 20 MG: 20 TABLET, FILM COATED ORAL at 20:56

## 2021-08-06 RX ADMIN — INSULIN LISPRO 2 UNITS: 100 INJECTION, SOLUTION INTRAVENOUS; SUBCUTANEOUS at 17:24

## 2021-08-06 RX ADMIN — HYDROCODONE POLISTIREX AND CHLORPHENIRAMINE POLISTIREX 5 ML: 10; 8 SUSPENSION, EXTENDED RELEASE ORAL at 20:56

## 2021-08-06 RX ADMIN — VENLAFAXINE 50 MG: 25 TABLET ORAL at 17:24

## 2021-08-06 RX ADMIN — MONTELUKAST SODIUM 10 MG: 10 TABLET, FILM COATED ORAL at 06:30

## 2021-08-06 RX ADMIN — INSULIN LISPRO 2 UNITS: 100 INJECTION, SOLUTION INTRAVENOUS; SUBCUTANEOUS at 12:31

## 2021-08-06 RX ADMIN — BUDESONIDE 0.5 MG: 0.5 INHALANT ORAL at 20:44

## 2021-08-06 RX ADMIN — ARFORMOTEROL TARTRATE 15 MCG: 15 SOLUTION RESPIRATORY (INHALATION) at 09:13

## 2021-08-06 RX ADMIN — FAMOTIDINE 20 MG: 20 TABLET ORAL at 16:47

## 2021-08-06 RX ADMIN — BUDESONIDE 0.5 MG: 0.5 INHALANT ORAL at 09:13

## 2021-08-06 RX ADMIN — SODIUM CHLORIDE 1000 ML: 9 INJECTION, SOLUTION INTRAVENOUS at 00:11

## 2021-08-06 RX ADMIN — POTASSIUM CHLORIDE 40 MEQ: 10 CAPSULE, COATED, EXTENDED RELEASE ORAL at 12:31

## 2021-08-06 RX ADMIN — AZITHROMYCIN 500 MG: 500 INJECTION, POWDER, LYOPHILIZED, FOR SOLUTION INTRAVENOUS at 00:11

## 2021-08-06 RX ADMIN — ENOXAPARIN SODIUM 40 MG: 40 INJECTION SUBCUTANEOUS at 16:47

## 2021-08-06 RX ADMIN — ARFORMOTEROL TARTRATE 15 MCG: 15 SOLUTION RESPIRATORY (INHALATION) at 20:44

## 2021-08-06 RX ADMIN — DEXAMETHASONE SODIUM PHOSPHATE 10 MG: 10 INJECTION INTRAMUSCULAR; INTRAVENOUS at 00:11

## 2021-08-06 RX ADMIN — IPRATROPIUM BROMIDE AND ALBUTEROL SULFATE 3 ML: 2.5; .5 SOLUTION RESPIRATORY (INHALATION) at 15:09

## 2021-08-06 RX ADMIN — AZITHROMYCIN 500 MG: 500 INJECTION, POWDER, LYOPHILIZED, FOR SOLUTION INTRAVENOUS at 09:54

## 2021-08-06 RX ADMIN — CEFTRIAXONE 1 G: 10 INJECTION, POWDER, FOR SOLUTION INTRAVENOUS at 09:08

## 2021-08-06 RX ADMIN — IPRATROPIUM BROMIDE AND ALBUTEROL SULFATE 3 ML: 2.5; .5 SOLUTION RESPIRATORY (INHALATION) at 20:45

## 2021-08-06 RX ADMIN — CEFTRIAXONE SODIUM 1 G: 1 INJECTION, POWDER, FOR SOLUTION INTRAMUSCULAR; INTRAVENOUS at 00:11

## 2021-08-06 RX ADMIN — FUROSEMIDE 20 MG: 10 INJECTION, SOLUTION INTRAMUSCULAR; INTRAVENOUS at 12:30

## 2021-08-06 RX ADMIN — REMDESIVIR 200 MG: 100 INJECTION, POWDER, LYOPHILIZED, FOR SOLUTION INTRAVENOUS at 13:37

## 2021-08-06 RX ADMIN — DEXAMETHASONE 6 MG: 0.5 TABLET ORAL at 09:08

## 2021-08-06 NOTE — ED PROVIDER NOTES
Time: 10:47 PM EDT  Arrived by: private car  Chief Complaint: Fever    History of Present Illness:  Patient is a 56 y.o. year old female that presents to the emergency department with a fever for the past 10 days. Today the temperature measured 103. She has also had nausea, lower abdominal pain, dyspnea, generalized weakness, cough, and diarrhea. She tested positive for COVID-19 on July 31, 2021.     She presents to the emergency department today because her symptoms are not improving.       History provided by:  Patient  Fever  Max temp prior to arrival:  103  Temp source:  Oral  Severity:  Moderate  Onset quality:  Gradual  Duration:  10 days  Associated symptoms: cough, diarrhea and nausea    Associated symptoms: no chest pain, no chills, no congestion, no ear pain, no headaches, no sore throat and no vomiting          Patient Care Team  Primary Care Provider: Gadiel Solis MD    Past Medical History:     No Known Allergies  Past Medical History:   Diagnosis Date   • Arthritis    • Asthma    • CHF (congestive heart failure) (CMS/[facility])    • Colitis    • COPD (chronic obstructive pulmonary disease) (CMS/[facility])    • Depression    • Dysphagia    • Edema    • Fatigue    • GERD (gastroesophageal reflux disease)    • History of acute pancreatitis    • History of histoplasmosis    • HTN (hypertension)    • Hyperlipidemia    • Light headed    • Migraines    • Multiple joint pain    • On home oxygen therapy     2 L AT NIGHT   • MANI (obstructive sleep apnea)     WEARS CPAP   • RLS (restless legs syndrome)      Past Surgical History:   Procedure Laterality Date   • BACK SURGERY     • BLADDER SUSPENSION     • BREAST BIOPSY Left    • COLONOSCOPY     • GASTRIC SLEEVE LAPAROSCOPIC N/A 5/15/2017    Procedure: GASTRIC SLEEVE LAPAROSCOPIC;  Surgeon: Edenilson Hannon Jr., MD;  Location: [facility] OR [facility] [place];  Service:    • HYSTERECTOMY     • LAPAROSCOPIC CHOLECYSTECTOMY     • LUNG BIOPSY     • NECK SURGERY     • OOPHORECTOMY       Family  History   Problem Relation Age of Onset   • Obesity Mother    • Hypertension Mother    • Obesity Sister    • Hypertension Sister    • Obesity Maternal Grandmother    • Hypertension Maternal Grandmother    • Stroke Maternal Grandmother    • Heart attack Maternal Grandmother        Home Medications:  Prior to Admission medications    Medication Sig Start Date End Date Taking? Authorizing Provider   albuterol (PROVENTIL) (2.5 MG/3ML) 0.083% nebulizer solution Take 2.5 mg by nebulization Every 4 (Four) Hours As Needed for wheezing.    Vladislav Gao MD   atorvastatin (LIPITOR) 20 MG tablet Take 20 mg by mouth Daily.    Vladislav Gao MD   fluticasone-salmeterol (ADVAIR) 500-50 MCG/DOSE DISKUS Inhale 1 puff 2 (Two) Times a Day.    Vladislav Gao MD   furosemide (LASIX) 40 MG tablet Take 40 mg by mouth Daily As Needed. 1-2 tablets as needed    Vladislav Gao MD   loratadine (CLARITIN) 10 MG tablet Take 10 mg by mouth Daily.    Vladislav Gao MD   metoprolol tartrate (LOPRESSOR) 25 MG tablet Take 50 mg by mouth 2 (Two) Times a Day.    Vladislav Gao MD   montelukast (SINGULAIR) 10 MG tablet Take 10 mg by mouth Every Morning.    Vladislav Gao MD   potassium chloride (K-DUR) 10 MEQ CR tablet Take 1 tablet by mouth 2 (Two) Times a Day. 17   Deborah Milton APRN   potassium chloride (K-DUR,KLOR-CON) 20 MEQ CR tablet Take 1 tablet by mouth 2 (Two) Times a Day. Take one tablet twice per day for 5 days then one tablet per day for 5 days. 18   Deborah Milton APRN   venlafaxine (EFFEXOR) 50 MG tablet Take 50 mg by mouth 2 (Two) Times a Day.    ProviderVladislav MD        Social History:   Social History     Tobacco Use   • Smoking status: Former Smoker     Packs/day: 1.00     Years: 25.00     Pack years: 25.00     Types: Cigarettes     Quit date:      Years since quittin.6   • Smokeless tobacco: Never Used   Vaping Use   • Vaping Use: Never used  "  Substance Use Topics   • Alcohol use: No   • Drug use: No       Record Review:  I have reviewed the patient's records in [facility].     Review of Systems:  Review of Systems   Constitutional: Positive for fever. Negative for chills.   HENT: Negative for congestion, ear pain and sore throat.    Eyes: Negative for pain.   Respiratory: Positive for cough and shortness of breath. Negative for chest tightness.    Cardiovascular: Negative for chest pain.   Gastrointestinal: Positive for abdominal pain (lower), diarrhea and nausea. Negative for vomiting.   Genitourinary: Negative for flank pain and hematuria.   Musculoskeletal: Negative for joint swelling.   Skin: Negative for pallor.   Neurological: Positive for weakness (generalized). Negative for seizures and headaches.   All other systems reviewed and are negative.       Physical Exam:  /42 (BP Location: Right arm, Patient Position: Lying)   Pulse 75   Temp 98.4 °F (36.9 °C)   Resp 20   Ht 165.1 cm (65\")   Wt 111 kg (244 lb 7.8 oz)   SpO2 95%   Breastfeeding No   BMI 40.69 kg/m²     Physical Exam  Vitals and nursing note reviewed.   Constitutional:       General: She is in acute distress (mild respiratory).      Appearance: She is ill-appearing. She is not toxic-appearing.   HENT:      Head: Normocephalic and atraumatic.      Mouth/Throat:      Mouth: Mucous membranes are moist.   Eyes:      Extraocular Movements: Extraocular movements intact.      Pupils: Pupils are equal, round, and reactive to light.   Cardiovascular:      Rate and Rhythm: Normal rate and regular rhythm.      Pulses: Normal pulses.      Heart sounds: Normal heart sounds.   Pulmonary:      Effort: Respiratory distress (mild) present.      Breath sounds: Rhonchi (mild bilateral) present.   Abdominal:      General: Abdomen is flat.      Palpations: Abdomen is soft.      Tenderness: There is no abdominal tenderness.   Musculoskeletal:         General: Normal range of motion.      Cervical " back: Normal range of motion and neck supple.   Skin:     General: Skin is warm and dry.   Neurological:      Mental Status: She is alert and oriented to person, place, and time. Mental status is at baseline.                Medications in the Emergency Department:  Medications   sodium chloride 0.9 % flush 10 mL (has no administration in time range)   atorvastatin (LIPITOR) tablet 20 mg (has no administration in time range)   montelukast (SINGULAIR) tablet 10 mg (10 mg Oral Given 8/6/21 0630)   venlafaxine (EFFEXOR) tablet 50 mg (has no administration in time range)   dexamethasone (DECADRON) tablet 6 mg (has no administration in time range)     Or   dexamethasone (DECADRON) injection 6 mg (has no administration in time range)   Pharmacy Consult - Remdesivir (has no administration in time range)   cefTRIAXone (ROCEPHIN) 1 g/50 mL 0.9% NS (has no administration in time range)   azithromycin (ZITHROMAX) 500 mg/250 mL NS (has no administration in time range)   enoxaparin (LOVENOX) syringe 40 mg (has no administration in time range)   budesonide (PULMICORT) nebulizer solution 0.5 mg (has no administration in time range)   arformoterol (BROVANA) nebulizer solution 15 mcg (has no administration in time range)   acetaminophen (TYLENOL) tablet 975 mg (975 mg Oral Given 8/5/21 2239)   ondansetron (ZOFRAN) injection 4 mg (4 mg Intravenous Given 8/5/21 2240)   cefTRIAXone (ROCEPHIN) in NS 1 gram/10ml IV PUSH syringe (1 g Intravenous Given 8/6/21 0011)   azithromycin (ZITHROMAX) 500 mg/250 mL NS (0 mg Intravenous Stopped 8/6/21 0223)   dexamethasone (DECADRON) injection 10 mg (10 mg Intravenous Given 8/6/21 0011)   sodium chloride 0.9 % bolus 1,000 mL (1,000 mL Intravenous New Bag 8/6/21 0011)        Labs  Lab Results (last 24 hours)     Procedure Component Value Units Date/Time    Comprehensive Metabolic Panel [[id number]]  (Abnormal) Collected: 08/05/21 2250    Specimen: Blood Updated: 08/05/21 2346     Glucose 121 mg/dL       BUN 13 mg/dL      Creatinine 1.01 mg/dL      Sodium 132 mmol/L      Potassium 3.5 mmol/L      Comment: Slight hemolysis detected by analyzer. Results may be affected.        Chloride 98 mmol/L      CO2 19.8 mmol/L      Calcium 8.2 mg/dL      Total Protein 7.1 g/dL      Albumin 3.40 g/dL      ALT (SGPT) 25 U/L      AST (SGOT) 42 U/L      Comment: Slight hemolysis detected by analyzer. Results may be affected.        Alkaline Phosphatase 119 U/L      Total Bilirubin 0.3 mg/dL      eGFR Non African Amer 57 mL/min/1.73      Globulin 3.7 gm/dL      A/G Ratio 0.9 g/dL      BUN/Creatinine Ratio 12.9     Anion Gap 14.2 mmol/L     Narrative:      GFR Normal >60  Chronic Kidney Disease <60  Kidney Failure <15      Lactic Acid, Plasma [[id number]]  (Normal) Collected: 08/05/21 2250    Specimen: Blood Updated: 08/05/21 2323     Lactate 1.4 mmol/L     CBC & Differential [[id number]]  (Abnormal) Collected: 08/05/21 2251    Specimen: Blood Updated: 08/05/21 2255    Narrative:      The following orders were created for panel order CBC & Differential.  Procedure                               Abnormality         Status                     ---------                               -----------         ------                     CBC Auto Differential[[id number]]        Abnormal            Final result                 Please view results for these tests on the individual orders.    Blood Culture - Blood, Arm, Left [[id number]] Collected: 08/05/21 2251    Specimen: Blood from Arm, Left Updated: 08/05/21 2251    CBC Auto Differential [[id number]]  (Abnormal) Collected: 08/05/21 2251    Specimen: Blood Updated: 08/05/21 2255     WBC 3.51 10*3/mm3      RBC 4.69 10*6/mm3      Hemoglobin 13.2 g/dL      Hematocrit 39.1 %      MCV 83.4 fL      MCH 28.1 pg      MCHC 33.8 g/dL      RDW 14.9 %      RDW-SD 45.0 fl      MPV 10.1 fL      Platelets 119 10*3/mm3      Neutrophil % 74.9 %      Lymphocyte % 21.4 %      Monocyte % 3.1 %      Eosinophil % 0.0 %       Basophil % 0.3 %      Immature Grans % 0.3 %      Neutrophils, Absolute 2.63 10*3/mm3      Lymphocytes, Absolute 0.75 10*3/mm3      Monocytes, Absolute 0.11 10*3/mm3      Eosinophils, Absolute 0.00 10*3/mm3      Basophils, Absolute 0.01 10*3/mm3      Immature Grans, Absolute 0.01 10*3/mm3      nRBC 0.0 /100 WBC     Blood Culture - Blood, Arm, Left [[id number]] Collected: 08/06/21 0010    Specimen: Blood from Arm, Left Updated: 08/06/21 0033    COVID PRE-OP / PRE-PROCEDURE SCREENING ORDER (NO ISOLATION) - Swab, Nasopharynx [[id number]] Collected: 08/06/21 0221    Specimen: Swab from Nasopharynx Updated: 08/06/21 0259    Narrative:      The following orders were created for panel order COVID PRE-OP / PRE-PROCEDURE SCREENING ORDER (NO ISOLATION) - Swab, Nasopharynx.  Procedure                               Abnormality         Status                     ---------                               -----------         ------                     COVID-19,CEPHEID,COR/NADINE...[[id number]]                      In process                   Please view results for these tests on the individual orders.    COVID-19,CEPHEID,COR/NADINE/PAD/MEHRAN IN-HOUSE(OR EMERGENT/ADD-ON),NP SWAB IN TRANSPORT MEDIA 3-4 HR TAT, RT-PCR - Swab, Nasopharynx [[id number]] Collected: 08/06/21 0221    Specimen: Swab from Nasopharynx Updated: 08/06/21 0259    Legionella Antigen, Urine - Urine, Urine, Clean Catch [[id number]]  (Normal) Collected: 08/06/21 0541    Specimen: Urine, Clean Catch Updated: 08/06/21 0718     LEGIONELLA ANTIGEN, URINE Negative    S. Pneumo Ag Urine or CSF - Urine, Urine, Clean Catch [[id number]]  (Normal) Collected: 08/06/21 0541    Specimen: Urine, Clean Catch Updated: 08/06/21 0718     Strep Pneumo Ag Negative    Ferritin [[id number]]  (Abnormal) Collected: 08/06/21 0553    Specimen: Blood Updated: 08/06/21 0630     Ferritin 360.90 ng/mL     Narrative:      <12 ng/mL usually associated with Iron Deficiency Anemia. Above normal range levels may  "be due to Hepatic and/or Chronic Inflammatory Disease.  Results may be falsely decreased if patient taking Biotin.      D-dimer, Quantitative [[id number]]  (Abnormal) Collected: 08/06/21 0553    Specimen: Blood Updated: 08/06/21 0619     D-Dimer, Quantitative 0.72 mg/L (FEU)     Narrative:      Effective 6/30/09 new normal reference range: <= 0.59 mg/L FEU  Increases in D-dimer concentration observed with  thromboembolic events can be variable due to localization,  size and age of the thrombus. Therefore, a thromboembolic  event cannot be diagnosed with certainty on the basis of the  reference range.  D-dimers may also be elevated for a variety of disorders   including: advanced age, pregnancy, coronary disease, cancer,  liver disease, infection, inflammation, hematoma, DIC, trauma,  post surgery, diabetes, thrombolytic therapy, stress, and  general hospitalization. D-dimer levels may be decreased in  patients on anticoagulant therapy.    Procalcitonin [[id number]]  (Normal) Collected: 08/06/21 0553    Specimen: Blood Updated: 08/06/21 0630     Procalcitonin 0.09 ng/mL     Narrative:      As a Marker for Sepsis (Non-Neonates):     1. <0.5 ng/mL represents a low risk of severe sepsis and/or septic shock.  2. >2 ng/mL represents a high risk of severe sepsis and/or septic shock.    As a Marker for Lower Respiratory Tract Infections that require antibiotic therapy:  PCT on Admission     Antibiotic Therapy             6-12 Hrs later  >0.5                          Strongly Recommended            >0.25 - <0.5             Recommended  0.1 - 0.25                  Discouraged                       Remeasure/reassess PCT  <0.1                         Strongly Discouraged         Remeasure/reassess PCT      As 28 day mortality risk marker: \"Change in Procalcitonin Result\" (>80% or <=80%) if Day 0 (or Day 1) and Day 4 values are available. Refer to http://www.[employer]-pct-calculator.com    Change in PCT <=80%   A decrease of PCT " levels below or equal to 80% defines a positive change in PCT test result representing a higher risk for 28-day all-cause mortality of patients diagnosed with severe sepsis or septic shock.    Change in PCT >80%   A decrease of PCT levels of more than 80% defines a negative change in PCT result representing a lower risk for 28-day all-cause mortality of patients diagnosed with severe sepsis or septic shock.    This test is Prognostic not Diagnostic, if elevated correlate with clinical findings before administering antibiotic treatment.      Results may be falsely decreased if patient taking Biotin.     C-reactive Protein [[id number]]  (Abnormal) Collected: 08/06/21 0553    Specimen: Blood Updated: 08/06/21 0636     C-Reactive Protein 7.57 mg/dL            Imaging:  XR Chest 1 View    Result Date: 8/6/2021  PROCEDURE: XR CHEST 1 VW  COMPARISON: [facility], , CHEST AP/PA 1 VIEW, 1/15/2020, 16:09.  INDICATIONS: hypoxia, covid-19+  FINDINGS: A single AP upright portable chest radiograph was performed.  Bilateral infiltrates are seen, which may represent infectious multifocal pneumonia.  No cardiac enlargement.  There are postoperative changes of the cervical spine.  No pneumothorax or pneumomediastinum is appreciated.  External artifacts obscure detail.  The thoracic aorta is atherosclerotic.  CONCLUSION: Bilateral infiltrates are seen.  The findings are consistent with the given history of infectious multifocal pneumonia due to COVID-19 infection.       JEANNE CHANG JR, MD       Electronically Signed and Approved By: JEANNE CHANG JR, MD on 8/06/2021 at 0:25               Procedures:  Procedures    Progress  ED Course as of Aug 06 0745   Fri Aug 06, 2021   0047 Spoke with Dr. Almeida, who agrees to admit the patient.     [LG]      ED Course User Index  [LG] Jo-Ann Arroyo                            Medical Decision Making:  [facility] [place]   Chest x-ray reveals bilateral multifocal infiltrates patient requiring  supplemental oxygen to maintain oxygen saturation greater than 90.  Patient treated with Decadron ceftriaxone azithromycin in the emergency department.  Cultures pending.  Patient discussed with hospitalist for admission.  The patient's airway is intact, vital signs, and respiratory status are safe for admission at this time.    Final diagnoses:   Acute respiratory failure with hypoxia (CMS/HCC)   Pneumonia due to COVID-19 virus        Disposition:  ED Disposition     ED Disposition Condition Comment    Decision to Admit  Level of Care: Med/Surg [1]   Diagnosis: Acute respiratory failure with hypoxia (CMS/HCC) [884386]   Admitting Physician: QUAN MUNSON [[id number]]   Attending Physician: QUAN MUNSON [[id number]]   Isolate for COVID?: Yes [1]   Certification: I Certify That Inpatient Hospital Services Are Medically Necessary For Greater Than 2 Midnights            Documentation assistance provided by Jo-Ann Arroyo acting as scribe for Freedom Reece MD. Information recorded by the scribe was done at my direction and has been verified and validated by me.        Jo-Ann Arroyo  08/05/21 [id number]       Freedom Reece MD  08/06/21 4224

## 2021-08-06 NOTE — PLAN OF CARE
Goal Outcome Evaluation:   STARTED ON LASIX AND MICRO. DECADRON INCREASED. PT HAVING NP FREQ COUGH. MRSA NEG. Cheyenne Li RN

## 2021-08-06 NOTE — H&P
[facility]  [facility]   Patient Name: Dang Hunt  : 1965  MRN: [id number]  Primary Care Physician:  Gadiel Solis MD  Date of admission: 2021    Subjective SOB   Subjective     Chief Complaint:     HPI: Dang Hunt is a 56 y.o. female resents with complaints of shortness of breath.  She states she has been sick for approximately 10 days has had a temperature of 103.  Patient has had nausea lower abdominal pain dyspnea and diarrhea.  She tested positive for Covid on .  She presents to the ED today and was found to be hypoxic with O2 sats between 89 to 96%.  Personal History     PMH  Past Medical History:   Diagnosis Date   • Arthritis    • Asthma    • CHF (congestive heart failure) (CMS/[facility])    • Colitis    • COPD (chronic obstructive pulmonary disease) (CMS/[facility])    • Depression    • Dysphagia    • Edema    • Fatigue    • GERD (gastroesophageal reflux disease)    • History of acute pancreatitis    • History of histoplasmosis    • HTN (hypertension)    • Hyperlipidemia    • Light headed    • Multiple joint pain          PSH  Past Surgical History:   Procedure Laterality Date   • BACK SURGERY     • BLADDER SUSPENSION     • BREAST BIOPSY Left    • COLONOSCOPY     • GASTRIC SLEEVE LAPAROSCOPIC N/A 5/15/2017    Procedure: GASTRIC SLEEVE LAPAROSCOPIC;  Surgeon: Edenilson Hannon Jr., MD;  [place] [facility] [place] [facility] [place]  Service:    • HYSTERECTOMY     • LAPAROSCOPIC CHOLECYSTECTOMY     • LUNG BIOPSY     • NECK SURGERY     • OOPHORECTOMY         FH  Problem Relation Age of Onset   • Obesity Mother    • Hypertension Mother    • Obesity Maternal Grandmother    • Hypertension Maternal Grandmother    • Stroke Maternal Grandmother    • Heart attack Maternal Grandmother        SOCIAL HISTORY   reports that she quit smoking about 9 years ago. Her smoking use included cigarettes. She has a 25.00 pack-year smoking history. She has never used smokeless tobacco. She  reports that she does not drink alcohol and does not use drugs.     ALLERGIES   No Known Allergies    HOME MEDICINES  Prior to Admission Medications   Prescriptions Last Dose Informant Patient Reported? Taking?   albuterol (PROVENTIL) (2.5 MG/3ML) 0.083% nebulizer solution   Yes No   Sig: Take 2.5 mg by nebulization Every 4 (Four) Hours As Needed for wheezing.   atorvastatin (LIPITOR) 20 MG tablet   Yes No   Sig: Take 20 mg by mouth Daily.   fluticasone-salmeterol (ADVAIR) 500-50 MCG/DOSE DISKUS   Yes No   Sig: Inhale 1 puff 2 (Two) Times a Day.   furosemide (LASIX) 40 MG tablet   Yes No   Sig: Take 40 mg by mouth Daily As Needed. 1-2 tablets as needed   loratadine (CLARITIN) 10 MG tablet   Yes No   Sig: Take 10 mg by mouth Daily.   metoprolol tartrate (LOPRESSOR) 25 MG tablet   Yes No   Sig: Take 50 mg by mouth 2 (Two) Times a Day.   montelukast (SINGULAIR) 10 MG tablet   Yes No   Sig: Take 10 mg by mouth Every Morning.   potassium chloride (K-DUR) 10 MEQ CR tablet   No No   Sig: Take 1 tablet by mouth 2 (Two) Times a Day.   potassium chloride (K-DUR,KLOR-CON) 20 MEQ CR tablet   No No   Sig: Take 1 tablet by mouth 2 (Two) Times a Day. Take one tablet twice per day for 5 days then one tablet per day for 5 days.   venlafaxine (EFFEXOR) 50 MG tablet   Yes No   Sig: Take 50 mg by mouth 2 (Two) Times a Day.      Facility-Administered Medications: None           REVIEW OF SYSTEMS  No fatigue, no fever or chills  Denies dysphagia or hearing changes  Denies cough dyspnea sputum and changes , denies hemoptysis or shortness of breath on exertion  Denies chest pain ,palpitations, orthopnea  Denies abdominal pain, nausea ,vomiting ,diarrhea ,blood  per rectum   Denies dysuria ,frequency  Denies joint effusion or joint tenderness  Denies bleeding or bruising  Denies headache , weakness, or fainting         Objective   Objective     Vitals:   Temp:  [103 °F (39.4 °C)] 103 °F (39.4 °C)  Heart Rate:  [84-94] 84  Resp:  [15-28]  28  BP: (125-152)/(73-79) 125/75    Physical Exam   Constitutional: Awake, alert, no acute distress  Eyes: Pupils equal, sclerae anicteric, no conjunctival injection  HENT: NCAT, mucous membranes moist  Neck: Supple, no thyromegaly, no lymphadenopathy, trachea midline  Respiratory: Clear to auscultation bilaterally, nonlabored respirations   Cardiovascular: RRR, no murmurs, rubs, or gallops, palpable pedal pulses bilaterally  Gastrointestinal: Positive bowel sounds, soft, nontender, nondistended  Musculoskeletal: No bilateral ankle edema, no clubbing or cyanosis to extremities  Psychiatric: Appropriate affect, cooperative  Neurologic: Oriented x 3, strength symmetric in all extremities, Cranial Nerves grossly intact to confrontation, speech clear  Skin: No rashes         Assessment / Plan     Assessment/Plan:     Bilateral infiltrates are seen  COVID-19  Hypoxic respiratory failure  Diabetes mellitus  Mild acute renal failure  Mild elevation of AST   -Was started on Decadron remdesivir.      History of COPD  -Continue home inhalers including Symbicort Singulair    Hyperlipidemia  -Continue statin    Hypertension  Restart patient's home medicine of  Mild acute renal failure  -We will hold Lasix today and repeat BUN/creatinine in the morning.  DVT prophylaxis:  No DVT prophylaxis order currently exists.    CODE STATUS:       Result Review:    I have personally reviewed the results from the time of this admission to 6/11/2021 06:16 EDT and agree with these findings:  [x]  Laboratory  [x]  Microbiology  [x]  Radiology  [x]  EKG/Telemetry   [x]  Cardiology/Vascular   []  Pathology  []  Old records  []  Other:    Admission Status:  I believe this patient meets inpatient status.    Electronically signed by Melissa Almeida MD, 08/06/21, 1:16 AM EDT.

## 2021-08-06 NOTE — CASE MANAGEMENT/SOCIAL WORK
Discharge Planning Assessment   Carlos     Patient Name: Dang Hunt  MRN: [id number]  Today's Date: 8/6/2021    Admit Date: 8/5/2021    Discharge Needs Assessment     Row Name 08/06/21 1017       Living Environment    Lives With  spouse    Current Living Arrangements  home/apartment/condo    Primary Care Provided by  self    Quality of Family Relationships  helpful;involved;supportive    Able to Return to Prior Arrangements  yes       Resource/Environmental Concerns    Resource/Environmental Concerns  none       Transition Planning    Patient/Family Anticipates Transition to  home    Patient/Family Anticipated Services at Transition  durable medical equipment Possible continuous O2 and neb needed.    Transportation Anticipated  family or friend will provide       Discharge Needs Assessment    Readmission Within the Last 30 Days  no previous admission in last 30 days    Equipment Currently Used at Home  cpap;oxygen O2 prn and cpap through [facility].    Equipment Needed After Discharge  other (see comments) Continuous O2 order if unable to wean.        Discharge Plan     Row Name 08/06/21 1027       Plan    Plan  Pt plans to dc home. Pt has cpap and O2 prn at home. Possible neb and continuous O2 at dc. Pt denies any other dc needs.        Continued Care and Services - Admitted Since 8/5/2021    Coordination has not been started for this encounter.         Demographic Summary     Row Name 08/06/21 1005       General Information    Admission Type  inpatient    Reason for Consult  discharge planning    Preferred Language  English     Used During This Interaction  no       Contact Information    Permission Granted to Share Info With  family/designee        Functional Status     Row Name 08/06/21 1013       Functional Status    Usual Activity Tolerance  excellent    Current Activity Tolerance  good       Functional Status, IADL    Medications  independent    Meal Preparation  independent     Housekeeping  independent    Laundry  independent    Shopping  independent       Mental Status Summary    Recent Changes in Mental Status/Cognitive Functioning  no changes        Psychosocial    No documentation.       Abuse/Neglect    No documentation.       Legal    No documentation.       Substance Abuse    No documentation.       Patient Forms    No documentation.           Maria De Jesus Palacios RN

## 2021-08-06 NOTE — CONSULTS
"Nutrition Services    Patient Name: Dang Hunt  YOB: 1965  MRN: [id number]  Admission date: 8/5/2021    CLINICAL NUTRITION ASSESSMENT      Reason for Assessment  Reduced oral intake     H&P:    Past Medical History:   Diagnosis Date   • Arthritis    • Asthma    • CHF (congestive heart failure) (CMS/[facility])    • Colitis    • COPD (chronic obstructive pulmonary disease) (CMS/Formerly [facility])    • Depression    • Dysphagia    • Edema    • Fatigue    • GERD (gastroesophageal reflux disease)    • History of acute pancreatitis    • History of histoplasmosis    • HTN (hypertension)    • Hyperlipidemia    • Light headed    • Migraines    • Multiple joint pain    • On home oxygen therapy     2 L AT NIGHT   • MANI (obstructive sleep apnea)     WEARS CPAP   • RLS (restless legs syndrome)         Current Problems:   Active Hospital Problems    Diagnosis    • Acute respiratory failure with hypoxia (CMS/[facility])         Nutrition/Diet History         Narrative     Consult for reported decrease intake * 1 month.  Per chart review pt dx with Covid July 31, decrease in intake related to dx.  BMI 40.69, Class III Obesity.  Diet of cardiac, carb consistent started for lunch.  Diet should be adequate to meet nutritional needs.     Anthropometrics        Current Height, Weight Height: 165.1 cm (65\")  Weight: 111 kg (244 lb 7.8 oz)   Current BMI Body mass index is 40.69 kg/m².       Weight Hx  Wt Readings from Last 30 Encounters:   08/06/21 0503 111 kg (244 lb 7.8 oz)   08/06/21 0339 111 kg (244 lb 7.8 oz)   08/05/21 2226 110 kg (243 lb 6.2 oz)   10/08/20 0858 116 kg (256 lb)   09/13/19 0923 116 kg (255 lb 5 oz)   07/01/19 0944 117 kg (257 lb)   08/22/18 1431 113 kg (249 lb 3 oz)   09/07/17 1143 104 kg (230 lb)   06/19/17 1007 112 kg (248 lb)   05/18/17 1030 119 kg (263 lb)   05/15/17 1526 127 kg (279 lb 3.2 oz)   05/04/17 0828 122 kg (268 lb 8 oz)   01/13/17 0718 119 kg (263 lb)            Wt Change Observation 12# wt loss since October, " 4.7% change     Estimated/Assessed Needs       Energy Requirements    EST Needs (kcal/day) 3919-5887       Protein Requirements    EST Daily Needs (g/day) 56-86 (1-1.5 gm)       Fluid Requirements     Estimated Needs (mL/day) 1749     Labs/Medications         Pertinent Labs Reviewed.   Results from last 7 days   Lab Units 08/05/21  2250   SODIUM mmol/L 132*   POTASSIUM mmol/L 3.5   CHLORIDE mmol/L 98   CO2 mmol/L 19.8*   BUN mg/dL 13   CREATININE mg/dL 1.01*   CALCIUM mg/dL 8.2*   BILIRUBIN mg/dL 0.3   ALK PHOS U/L 119*   ALT (SGPT) U/L 25   AST (SGOT) U/L 42*   GLUCOSE mg/dL 121*     Results from last 7 days   Lab Units 08/05/21  2251   HEMOGLOBIN g/dL 13.2   HEMATOCRIT % 39.1     COVID19   Date Value Ref Range Status   08/06/2021 Detected (C) Not Detected - Ref. Range Final       Pertinent Medications Reviewed.     Current Nutrition Orders & Evaluation of Intake       Oral Nutrition     Current PO Diet Diet Regular; Cardiac, Consistent Carbohydrate   Supplement No active supplement orders       Nutrition Diagnosis         Nutrition Dx Problem 1 Class III Obesity related to excessive energy intake as evidenced based by BMI 40.69.       Nutrition Intervention         Continue Cardiac, Carb Consistent diet     Monitor/Evaluation        Monitor monitor/eval: PO intake, Pertinent labs, Weight       Electronically signed by:  Lindsay Lopez RD  08/06/21 12:28 EDT

## 2021-08-07 LAB
ALBUMIN SERPL-MCNC: 3.1 G/DL (ref 3.5–5.2)
ALBUMIN/GLOB SERPL: 0.8 G/DL
ALP SERPL-CCNC: 106 U/L (ref 39–117)
ALT SERPL W P-5'-P-CCNC: 16 U/L (ref 1–33)
ANION GAP SERPL CALCULATED.3IONS-SCNC: 8.5 MMOL/L (ref 5–15)
AST SERPL-CCNC: 30 U/L (ref 1–32)
BASOPHILS # BLD AUTO: 0 10*3/MM3 (ref 0–0.2)
BASOPHILS NFR BLD AUTO: 0 % (ref 0–1.5)
BILIRUB SERPL-MCNC: 0.2 MG/DL (ref 0–1.2)
BUN SERPL-MCNC: 15 MG/DL (ref 6–20)
BUN/CREAT SERPL: 16.7 (ref 7–25)
CALCIUM SPEC-SCNC: 8.8 MG/DL (ref 8.6–10.5)
CHLORIDE SERPL-SCNC: 105 MMOL/L (ref 98–107)
CO2 SERPL-SCNC: 23.5 MMOL/L (ref 22–29)
CREAT SERPL-MCNC: 0.9 MG/DL (ref 0.57–1)
CRP SERPL-MCNC: 5.71 MG/DL (ref 0–0.5)
D DIMER PPP FEU-MCNC: 0.41 MG/L (FEU) (ref 0–0.59)
D-LACTATE SERPL-SCNC: 1.4 MMOL/L (ref 0.5–2)
DEPRECATED RDW RBC AUTO: 48 FL (ref 37–54)
EOSINOPHIL # BLD AUTO: 0 10*3/MM3 (ref 0–0.4)
EOSINOPHIL NFR BLD AUTO: 0 % (ref 0.3–6.2)
ERYTHROCYTE [DISTWIDTH] IN BLOOD BY AUTOMATED COUNT: 15.5 % (ref 12.3–15.4)
ERYTHROCYTE [SEDIMENTATION RATE] IN BLOOD: 30 MM/HR (ref 0–30)
FERRITIN SERPL-MCNC: 423.3 NG/ML (ref 13–150)
GFR SERPL CREATININE-BSD FRML MDRD: 65 ML/MIN/1.73
GLOBULIN UR ELPH-MCNC: 3.8 GM/DL
GLUCOSE BLDC GLUCOMTR-MCNC: 119 MG/DL (ref 70–99)
GLUCOSE BLDC GLUCOMTR-MCNC: 132 MG/DL (ref 70–99)
GLUCOSE BLDC GLUCOMTR-MCNC: 139 MG/DL (ref 70–99)
GLUCOSE SERPL-MCNC: 142 MG/DL (ref 65–99)
HCT VFR BLD AUTO: 39.9 % (ref 34–46.6)
HGB BLD-MCNC: 12.9 G/DL (ref 12–15.9)
IMM GRANULOCYTES # BLD AUTO: 0.04 10*3/MM3 (ref 0–0.05)
IMM GRANULOCYTES NFR BLD AUTO: 0.5 % (ref 0–0.5)
LYMPHOCYTES # BLD AUTO: 0.9 10*3/MM3 (ref 0.7–3.1)
LYMPHOCYTES NFR BLD AUTO: 11.5 % (ref 19.6–45.3)
MAGNESIUM SERPL-MCNC: 2 MG/DL (ref 1.6–2.6)
MCH RBC QN AUTO: 27.5 PG (ref 26.6–33)
MCHC RBC AUTO-ENTMCNC: 32.3 G/DL (ref 31.5–35.7)
MCV RBC AUTO: 85.1 FL (ref 79–97)
MONOCYTES # BLD AUTO: 0.2 10*3/MM3 (ref 0.1–0.9)
MONOCYTES NFR BLD AUTO: 2.5 % (ref 5–12)
NEUTROPHILS NFR BLD AUTO: 6.72 10*3/MM3 (ref 1.7–7)
NEUTROPHILS NFR BLD AUTO: 85.5 % (ref 42.7–76)
NRBC BLD AUTO-RTO: 0 /100 WBC (ref 0–0.2)
PHOSPHATE SERPL-MCNC: 3.1 MG/DL (ref 2.5–4.5)
PLATELET # BLD AUTO: 161 10*3/MM3 (ref 140–450)
PMV BLD AUTO: 10.1 FL (ref 6–12)
POTASSIUM SERPL-SCNC: 4 MMOL/L (ref 3.5–5.2)
PROCALCITONIN SERPL-MCNC: 0.09 NG/ML (ref 0–0.25)
PROT SERPL-MCNC: 6.9 G/DL (ref 6–8.5)
RBC # BLD AUTO: 4.69 10*6/MM3 (ref 3.77–5.28)
SODIUM SERPL-SCNC: 137 MMOL/L (ref 136–145)
WBC # BLD AUTO: 7.86 10*3/MM3 (ref 3.4–10.8)

## 2021-08-07 PROCEDURE — 83735 ASSAY OF MAGNESIUM: CPT | Performed by: INTERNAL MEDICINE

## 2021-08-07 PROCEDURE — 83605 ASSAY OF LACTIC ACID: CPT | Performed by: INTERNAL MEDICINE

## 2021-08-07 PROCEDURE — 85379 FIBRIN DEGRADATION QUANT: CPT | Performed by: INTERNAL MEDICINE

## 2021-08-07 PROCEDURE — 94660 CPAP INITIATION&MGMT: CPT

## 2021-08-07 PROCEDURE — 25010000002 AZITHROMYCIN PER 500 MG: Performed by: PHYSICIAN ASSISTANT

## 2021-08-07 PROCEDURE — 84145 PROCALCITONIN (PCT): CPT | Performed by: INTERNAL MEDICINE

## 2021-08-07 PROCEDURE — 25010000002 DEXAMETHASONE PER 1 MG: Performed by: INTERNAL MEDICINE

## 2021-08-07 PROCEDURE — 86140 C-REACTIVE PROTEIN: CPT | Performed by: INTERNAL MEDICINE

## 2021-08-07 PROCEDURE — 94799 UNLISTED PULMONARY SVC/PX: CPT

## 2021-08-07 PROCEDURE — 25010000002 ENOXAPARIN PER 10 MG: Performed by: INTERNAL MEDICINE

## 2021-08-07 PROCEDURE — 36415 COLL VENOUS BLD VENIPUNCTURE: CPT | Performed by: INTERNAL MEDICINE

## 2021-08-07 PROCEDURE — 82728 ASSAY OF FERRITIN: CPT | Performed by: INTERNAL MEDICINE

## 2021-08-07 PROCEDURE — 85652 RBC SED RATE AUTOMATED: CPT | Performed by: INTERNAL MEDICINE

## 2021-08-07 PROCEDURE — 80053 COMPREHEN METABOLIC PANEL: CPT | Performed by: PHYSICIAN ASSISTANT

## 2021-08-07 PROCEDURE — 84100 ASSAY OF PHOSPHORUS: CPT | Performed by: INTERNAL MEDICINE

## 2021-08-07 PROCEDURE — 85025 COMPLETE CBC W/AUTO DIFF WBC: CPT | Performed by: PHYSICIAN ASSISTANT

## 2021-08-07 PROCEDURE — 99233 SBSQ HOSP IP/OBS HIGH 50: CPT | Performed by: INTERNAL MEDICINE

## 2021-08-07 PROCEDURE — 25010000002 KETOROLAC TROMETHAMINE PER 15 MG: Performed by: INTERNAL MEDICINE

## 2021-08-07 PROCEDURE — 25010000002 FUROSEMIDE PER 20 MG: Performed by: INTERNAL MEDICINE

## 2021-08-07 PROCEDURE — 82962 GLUCOSE BLOOD TEST: CPT

## 2021-08-07 PROCEDURE — 25010000002 CEFTRIAXONE PER 250 MG: Performed by: PHYSICIAN ASSISTANT

## 2021-08-07 RX ORDER — VENLAFAXINE HYDROCHLORIDE 75 MG/1
75 CAPSULE, EXTENDED RELEASE ORAL DAILY
COMMUNITY
End: 2022-12-01

## 2021-08-07 RX ORDER — BUTALBITAL, ACETAMINOPHEN AND CAFFEINE 300; 40; 50 MG/1; MG/1; MG/1
1 CAPSULE ORAL EVERY 6 HOURS PRN
Status: DISCONTINUED | OUTPATIENT
Start: 2021-08-07 | End: 2021-08-13 | Stop reason: HOSPADM

## 2021-08-07 RX ORDER — KETOROLAC TROMETHAMINE 30 MG/ML
30 INJECTION, SOLUTION INTRAMUSCULAR; INTRAVENOUS EVERY 6 HOURS PRN
Status: DISPENSED | OUTPATIENT
Start: 2021-08-07 | End: 2021-08-12

## 2021-08-07 RX ADMIN — KETOROLAC TROMETHAMINE 30 MG: 30 INJECTION, SOLUTION INTRAMUSCULAR; INTRAVENOUS at 14:47

## 2021-08-07 RX ADMIN — ENOXAPARIN SODIUM 40 MG: 40 INJECTION SUBCUTANEOUS at 17:54

## 2021-08-07 RX ADMIN — IPRATROPIUM BROMIDE AND ALBUTEROL SULFATE 3 ML: 2.5; .5 SOLUTION RESPIRATORY (INHALATION) at 11:49

## 2021-08-07 RX ADMIN — REMDESIVIR 100 MG: 100 INJECTION, POWDER, LYOPHILIZED, FOR SOLUTION INTRAVENOUS at 14:19

## 2021-08-07 RX ADMIN — VENLAFAXINE 50 MG: 25 TABLET ORAL at 10:27

## 2021-08-07 RX ADMIN — FAMOTIDINE 20 MG: 20 TABLET ORAL at 17:54

## 2021-08-07 RX ADMIN — DEXAMETHASONE SODIUM PHOSPHATE 10 MG: 10 INJECTION INTRAMUSCULAR; INTRAVENOUS at 10:18

## 2021-08-07 RX ADMIN — BUTALBITAL, ACETAMINOPHEN AND CAFFEINE 1 CAPSULE: 300; 40; 50 CAPSULE ORAL at 18:50

## 2021-08-07 RX ADMIN — IPRATROPIUM BROMIDE AND ALBUTEROL SULFATE 3 ML: 2.5; .5 SOLUTION RESPIRATORY (INHALATION) at 18:48

## 2021-08-07 RX ADMIN — MONTELUKAST SODIUM 10 MG: 10 TABLET, FILM COATED ORAL at 06:54

## 2021-08-07 RX ADMIN — IPRATROPIUM BROMIDE AND ALBUTEROL SULFATE 3 ML: 2.5; .5 SOLUTION RESPIRATORY (INHALATION) at 00:25

## 2021-08-07 RX ADMIN — ATORVASTATIN CALCIUM 20 MG: 20 TABLET, FILM COATED ORAL at 22:30

## 2021-08-07 RX ADMIN — FUROSEMIDE 20 MG: 10 INJECTION, SOLUTION INTRAMUSCULAR; INTRAVENOUS at 10:18

## 2021-08-07 RX ADMIN — BUDESONIDE 0.5 MG: 0.5 INHALANT ORAL at 18:48

## 2021-08-07 RX ADMIN — IPRATROPIUM BROMIDE AND ALBUTEROL SULFATE 3 ML: 2.5; .5 SOLUTION RESPIRATORY (INHALATION) at 12:02

## 2021-08-07 RX ADMIN — ARFORMOTEROL TARTRATE 15 MCG: 15 SOLUTION RESPIRATORY (INHALATION) at 18:48

## 2021-08-07 RX ADMIN — KETOROLAC TROMETHAMINE 30 MG: 30 INJECTION, SOLUTION INTRAMUSCULAR; INTRAVENOUS at 22:30

## 2021-08-07 RX ADMIN — POTASSIUM CHLORIDE 40 MEQ: 10 CAPSULE, COATED, EXTENDED RELEASE ORAL at 10:19

## 2021-08-07 RX ADMIN — HYDROCODONE POLISTIREX AND CHLORPHENIRAMINE POLISTIREX 5 ML: 10; 8 SUSPENSION, EXTENDED RELEASE ORAL at 22:30

## 2021-08-07 RX ADMIN — BUDESONIDE 0.5 MG: 0.5 INHALANT ORAL at 07:00

## 2021-08-07 RX ADMIN — HYDROCODONE POLISTIREX AND CHLORPHENIRAMINE POLISTIREX 5 ML: 10; 8 SUSPENSION, EXTENDED RELEASE ORAL at 10:19

## 2021-08-07 RX ADMIN — ARFORMOTEROL TARTRATE 15 MCG: 15 SOLUTION RESPIRATORY (INHALATION) at 07:00

## 2021-08-07 RX ADMIN — CEFTRIAXONE 1 G: 10 INJECTION, POWDER, FOR SOLUTION INTRAVENOUS at 13:35

## 2021-08-07 RX ADMIN — VENLAFAXINE HYDROCHLORIDE 112.5 MG: 75 CAPSULE, EXTENDED RELEASE ORAL at 13:42

## 2021-08-07 RX ADMIN — IPRATROPIUM BROMIDE AND ALBUTEROL SULFATE 3 ML: 2.5; .5 SOLUTION RESPIRATORY (INHALATION) at 07:00

## 2021-08-07 RX ADMIN — FAMOTIDINE 20 MG: 20 TABLET ORAL at 06:55

## 2021-08-07 RX ADMIN — BUTALBITAL, ACETAMINOPHEN AND CAFFEINE 1 CAPSULE: 300; 40; 50 CAPSULE ORAL at 12:49

## 2021-08-07 RX ADMIN — AZITHROMYCIN 500 MG: 500 INJECTION, POWDER, LYOPHILIZED, FOR SOLUTION INTRAVENOUS at 10:17

## 2021-08-07 NOTE — PLAN OF CARE
Goal Outcome Evaluation:              Outcome Summary: VSS, no significant changes, no c/o pain, etc.

## 2021-08-07 NOTE — PROGRESS NOTES
[facility]   Hospitalist Progress Note  Date: 2021  Patient Name: Dang Hunt  : 1965  MRN: [id number]  Date of admission: 2021      Subjective   Subjective     Chief Complaint: Shortness of air    Summary:   Dang Hunt is a 56 y.o. female resents with complaints of shortness of breath.  She states she has been sick for approximately 10 days has had a temperature of 103.  Patient has had nausea lower abdominal pain dyspnea and diarrhea.  She tested positive for Covid on .  She presents to the ED today and was found to be hypoxic with O2 sats between 89 to 96%.  Patient has not been vaccinated for COVID-19.  Patient uses CPAP at night at home not aware of the pressure    Interval Followup:   91% saturation on three to nasal cannula..  No home oxygen use.  At night uses 2 L of oxygen bleed into CPAP  Other vital signs stable.  Continues to have shortness of air.  Does have cough with minimal mucus.  Does have loss of taste and smell but is improving.  Appetite is down.  Occasional diarrhea persist.  Abdominal cramps.  Complaining of headache thinks it is due to nebulizer treatment or steroids.      Review of Systems   All systems were reviewed and negative except for: Summary and interval follow-up    Objective   Objective     Vitals:   Temp:  [97.8 °F (36.6 °C)-98.8 °F (37.1 °C)] 98.8 °F (37.1 °C)  Heart Rate:  [80-90] 87  Resp:  [18-22] 20  BP: ()/(36-90) 143/84  Flow (L/min):  [3] 3  Physical Exam    Constitutional: Awake, alert, no acute distress   Eyes: Pupils equal, sclerae anicteric, no conjunctival injection   HENT: NCAT, mucous membranes moist   Neck: Supple, no thyromegaly, no lymphadenopathy, trachea midline   Respiratory: Rhonchi and decreased breath sounds bilaterally, nonlabored respirations    Cardiovascular: RRR, no murmurs, rubs, or gallops, palpable pedal pulses bilaterally   Gastrointestinal: Positive bowel sounds, soft, nontender,  nondistended   Musculoskeletal: No bilateral ankle edema, no clubbing or cyanosis to extremities   Psychiatric: Appropriate affect, cooperative   Neurologic: Oriented x 3, strength symmetric in all extremities, Cranial Nerves grossly intact to confrontation, speech clear   Skin: No rashes     Result Review    Result Review:  I have personally reviewed the results from the time of this admission to 8/7/2021 18:48 EDT and agree with these findings:  [x]  Laboratory  [x]  Microbiology  [x]  Radiology  [x]  EKG/Telemetry   []  Cardiology/Vascular   []  Pathology  [x]  Old records  [x]  Other: Medications  Assessment/Plan   Assessment / Plan     Assessment:  Acute hypoxic respiratory failure.  Patient does not use oxygen at home.  COVID-19 pneumonia causing above.  Patient not vaccinated.  Likely community-acquired pneumonia.  NIDDM.  Hemoglobin A1c of 5.8%  Morbid obesity BMI 40.6.  Obstructive sleep apnea on home CPAP.  History of COPD.  Hypertension.  Hyperlipidemia.     Plan:  Continue supplemental oxygen keep sats more than 92%.  Prone ventilation encouraged.  Patient started on IV remdesivir day 2/5.  Continue IV Decadron.  Rocephin and Zithromax.  Nebulizer treatment with DuoNeb, Pulmicort Brovana neb.  Bronchodilator and bronchopulmonary hygiene protocol.  Tussionex.  Sliding scale insulin.  IV Lasix.  As she can Toradol for the headache.  Resume home medication including Effexor.  Await culture data.  MRSA DNA PCR, Legionella and strep pneumo antigen negative.  Blood culture negative at day one.  Inflammatory markers improving continue to trend.  D-dimer and ESR negative.  CRP trending down  Procalcitonin and lactic negative.  Pepcid.  DVT prophylaxis with Lovenox    Discussed plan with RN.  Discussed with patient if remains stable can be released earlier to home    DVT prophylaxis:  Medical DVT prophylaxis orders are present.    CODE STATUS:   Level Of Support Discussed With: Patient  Code Status: CPR  Medical  Interventions (Level of Support Prior to Arrest): Full      Part of this note may be an electronic transcription/translation of spoken language to printed text using the Dragon Dictation System.     Electronically signed by Markell Triana MD, 08/07/21, 6:48 PM EDT.

## 2021-08-08 LAB
ALBUMIN SERPL-MCNC: 3.3 G/DL (ref 3.5–5.2)
ALBUMIN/GLOB SERPL: 1 G/DL
ALP SERPL-CCNC: 107 U/L (ref 39–117)
ALT SERPL W P-5'-P-CCNC: 17 U/L (ref 1–33)
ANION GAP SERPL CALCULATED.3IONS-SCNC: 11.1 MMOL/L (ref 5–15)
AST SERPL-CCNC: 27 U/L (ref 1–32)
BASOPHILS # BLD AUTO: 0.01 10*3/MM3 (ref 0–0.2)
BASOPHILS NFR BLD AUTO: 0.1 % (ref 0–1.5)
BILIRUB SERPL-MCNC: 0.2 MG/DL (ref 0–1.2)
BUN SERPL-MCNC: 19 MG/DL (ref 6–20)
BUN/CREAT SERPL: 18.3 (ref 7–25)
CALCIUM SPEC-SCNC: 8.5 MG/DL (ref 8.6–10.5)
CHLORIDE SERPL-SCNC: 103 MMOL/L (ref 98–107)
CO2 SERPL-SCNC: 23.9 MMOL/L (ref 22–29)
CREAT SERPL-MCNC: 1.04 MG/DL (ref 0.57–1)
CRP SERPL-MCNC: 6 MG/DL (ref 0–0.5)
DEPRECATED RDW RBC AUTO: 49.3 FL (ref 37–54)
EOSINOPHIL # BLD AUTO: 0 10*3/MM3 (ref 0–0.4)
EOSINOPHIL NFR BLD AUTO: 0 % (ref 0.3–6.2)
ERYTHROCYTE [DISTWIDTH] IN BLOOD BY AUTOMATED COUNT: 15.6 % (ref 12.3–15.4)
ERYTHROCYTE [SEDIMENTATION RATE] IN BLOOD: 31 MM/HR (ref 0–30)
FERRITIN SERPL-MCNC: 345.6 NG/ML (ref 13–150)
GFR SERPL CREATININE-BSD FRML MDRD: 55 ML/MIN/1.73
GLOBULIN UR ELPH-MCNC: 3.4 GM/DL
GLUCOSE BLDC GLUCOMTR-MCNC: 120 MG/DL (ref 70–99)
GLUCOSE BLDC GLUCOMTR-MCNC: 120 MG/DL (ref 70–99)
GLUCOSE SERPL-MCNC: 133 MG/DL (ref 65–99)
HCT VFR BLD AUTO: 38.8 % (ref 34–46.6)
HGB BLD-MCNC: 12.4 G/DL (ref 12–15.9)
IMM GRANULOCYTES # BLD AUTO: 0.03 10*3/MM3 (ref 0–0.05)
IMM GRANULOCYTES NFR BLD AUTO: 0.4 % (ref 0–0.5)
LYMPHOCYTES # BLD AUTO: 0.83 10*3/MM3 (ref 0.7–3.1)
LYMPHOCYTES NFR BLD AUTO: 12.1 % (ref 19.6–45.3)
MAGNESIUM SERPL-MCNC: 1.8 MG/DL (ref 1.6–2.6)
MCH RBC QN AUTO: 27.6 PG (ref 26.6–33)
MCHC RBC AUTO-ENTMCNC: 32 G/DL (ref 31.5–35.7)
MCV RBC AUTO: 86.4 FL (ref 79–97)
MONOCYTES # BLD AUTO: 0.2 10*3/MM3 (ref 0.1–0.9)
MONOCYTES NFR BLD AUTO: 2.9 % (ref 5–12)
NEUTROPHILS NFR BLD AUTO: 5.78 10*3/MM3 (ref 1.7–7)
NEUTROPHILS NFR BLD AUTO: 84.5 % (ref 42.7–76)
NRBC BLD AUTO-RTO: 0 /100 WBC (ref 0–0.2)
PHOSPHATE SERPL-MCNC: 3.2 MG/DL (ref 2.5–4.5)
PLATELET # BLD AUTO: 175 10*3/MM3 (ref 140–450)
PMV BLD AUTO: 10.2 FL (ref 6–12)
POTASSIUM SERPL-SCNC: 3.7 MMOL/L (ref 3.5–5.2)
PROT SERPL-MCNC: 6.7 G/DL (ref 6–8.5)
RBC # BLD AUTO: 4.49 10*6/MM3 (ref 3.77–5.28)
SODIUM SERPL-SCNC: 138 MMOL/L (ref 136–145)
WBC # BLD AUTO: 6.85 10*3/MM3 (ref 3.4–10.8)

## 2021-08-08 PROCEDURE — 94799 UNLISTED PULMONARY SVC/PX: CPT

## 2021-08-08 PROCEDURE — 25010000002 AZITHROMYCIN PER 500 MG: Performed by: PHYSICIAN ASSISTANT

## 2021-08-08 PROCEDURE — 25010000002 DEXAMETHASONE PER 1 MG: Performed by: INTERNAL MEDICINE

## 2021-08-08 PROCEDURE — 86140 C-REACTIVE PROTEIN: CPT | Performed by: INTERNAL MEDICINE

## 2021-08-08 PROCEDURE — 25010000002 FUROSEMIDE PER 20 MG: Performed by: INTERNAL MEDICINE

## 2021-08-08 PROCEDURE — 25010000002 ENOXAPARIN PER 10 MG: Performed by: INTERNAL MEDICINE

## 2021-08-08 PROCEDURE — 85025 COMPLETE CBC W/AUTO DIFF WBC: CPT | Performed by: PHYSICIAN ASSISTANT

## 2021-08-08 PROCEDURE — 25010000002 CEFTRIAXONE PER 250 MG: Performed by: PHYSICIAN ASSISTANT

## 2021-08-08 PROCEDURE — 85652 RBC SED RATE AUTOMATED: CPT | Performed by: INTERNAL MEDICINE

## 2021-08-08 PROCEDURE — 94660 CPAP INITIATION&MGMT: CPT

## 2021-08-08 PROCEDURE — 80053 COMPREHEN METABOLIC PANEL: CPT | Performed by: PHYSICIAN ASSISTANT

## 2021-08-08 PROCEDURE — 99233 SBSQ HOSP IP/OBS HIGH 50: CPT | Performed by: INTERNAL MEDICINE

## 2021-08-08 PROCEDURE — 82962 GLUCOSE BLOOD TEST: CPT

## 2021-08-08 PROCEDURE — 82728 ASSAY OF FERRITIN: CPT | Performed by: INTERNAL MEDICINE

## 2021-08-08 PROCEDURE — 83735 ASSAY OF MAGNESIUM: CPT | Performed by: INTERNAL MEDICINE

## 2021-08-08 PROCEDURE — 84100 ASSAY OF PHOSPHORUS: CPT | Performed by: INTERNAL MEDICINE

## 2021-08-08 PROCEDURE — 25010000002 KETOROLAC TROMETHAMINE PER 15 MG: Performed by: INTERNAL MEDICINE

## 2021-08-08 RX ADMIN — SODIUM CHLORIDE, PRESERVATIVE FREE 10 ML: 5 INJECTION INTRAVENOUS at 19:55

## 2021-08-08 RX ADMIN — IPRATROPIUM BROMIDE AND ALBUTEROL SULFATE 3 ML: 2.5; .5 SOLUTION RESPIRATORY (INHALATION) at 00:25

## 2021-08-08 RX ADMIN — CEFTRIAXONE 1 G: 10 INJECTION, POWDER, FOR SOLUTION INTRAVENOUS at 08:57

## 2021-08-08 RX ADMIN — FUROSEMIDE 20 MG: 10 INJECTION, SOLUTION INTRAMUSCULAR; INTRAVENOUS at 08:58

## 2021-08-08 RX ADMIN — HYDROCODONE POLISTIREX AND CHLORPHENIRAMINE POLISTIREX 5 ML: 10; 8 SUSPENSION, EXTENDED RELEASE ORAL at 20:00

## 2021-08-08 RX ADMIN — HYDROCODONE POLISTIREX AND CHLORPHENIRAMINE POLISTIREX 5 ML: 10; 8 SUSPENSION, EXTENDED RELEASE ORAL at 08:59

## 2021-08-08 RX ADMIN — AZITHROMYCIN 500 MG: 500 INJECTION, POWDER, LYOPHILIZED, FOR SOLUTION INTRAVENOUS at 09:53

## 2021-08-08 RX ADMIN — POTASSIUM CHLORIDE 40 MEQ: 10 CAPSULE, COATED, EXTENDED RELEASE ORAL at 08:59

## 2021-08-08 RX ADMIN — DEXAMETHASONE SODIUM PHOSPHATE 10 MG: 10 INJECTION INTRAMUSCULAR; INTRAVENOUS at 08:58

## 2021-08-08 RX ADMIN — ATORVASTATIN CALCIUM 20 MG: 20 TABLET, FILM COATED ORAL at 20:00

## 2021-08-08 RX ADMIN — FAMOTIDINE 20 MG: 20 TABLET ORAL at 06:51

## 2021-08-08 RX ADMIN — ARFORMOTEROL TARTRATE 15 MCG: 15 SOLUTION RESPIRATORY (INHALATION) at 18:21

## 2021-08-08 RX ADMIN — IPRATROPIUM BROMIDE AND ALBUTEROL SULFATE 3 ML: 2.5; .5 SOLUTION RESPIRATORY (INHALATION) at 06:30

## 2021-08-08 RX ADMIN — BUDESONIDE 0.5 MG: 0.5 INHALANT ORAL at 06:30

## 2021-08-08 RX ADMIN — IPRATROPIUM BROMIDE AND ALBUTEROL SULFATE 3 ML: 2.5; .5 SOLUTION RESPIRATORY (INHALATION) at 18:21

## 2021-08-08 RX ADMIN — REMDESIVIR 100 MG: 100 INJECTION, POWDER, LYOPHILIZED, FOR SOLUTION INTRAVENOUS at 13:27

## 2021-08-08 RX ADMIN — BUDESONIDE 0.5 MG: 0.5 INHALANT ORAL at 18:21

## 2021-08-08 RX ADMIN — FAMOTIDINE 20 MG: 20 TABLET ORAL at 17:20

## 2021-08-08 RX ADMIN — ARFORMOTEROL TARTRATE 15 MCG: 15 SOLUTION RESPIRATORY (INHALATION) at 06:30

## 2021-08-08 RX ADMIN — BENZOCAINE AND MENTHOL 1 LOZENGE: 15; 3.6 LOZENGE ORAL at 20:06

## 2021-08-08 RX ADMIN — IPRATROPIUM BROMIDE AND ALBUTEROL SULFATE 3 ML: 2.5; .5 SOLUTION RESPIRATORY (INHALATION) at 23:32

## 2021-08-08 RX ADMIN — IPRATROPIUM BROMIDE AND ALBUTEROL SULFATE 3 ML: 2.5; .5 SOLUTION RESPIRATORY (INHALATION) at 12:09

## 2021-08-08 RX ADMIN — MONTELUKAST SODIUM 10 MG: 10 TABLET, FILM COATED ORAL at 06:51

## 2021-08-08 RX ADMIN — VENLAFAXINE HYDROCHLORIDE 225 MG: 150 CAPSULE, EXTENDED RELEASE ORAL at 08:59

## 2021-08-08 RX ADMIN — KETOROLAC TROMETHAMINE 30 MG: 30 INJECTION, SOLUTION INTRAMUSCULAR; INTRAVENOUS at 13:27

## 2021-08-08 RX ADMIN — BUTALBITAL, ACETAMINOPHEN AND CAFFEINE 1 CAPSULE: 300; 40; 50 CAPSULE ORAL at 08:57

## 2021-08-08 RX ADMIN — ENOXAPARIN SODIUM 40 MG: 40 INJECTION SUBCUTANEOUS at 17:20

## 2021-08-08 RX ADMIN — BUTALBITAL, ACETAMINOPHEN AND CAFFEINE 1 CAPSULE: 300; 40; 50 CAPSULE ORAL at 19:57

## 2021-08-08 NOTE — PLAN OF CARE
Goal Outcome Evaluation:      Pt stable this shift. Complains of headache, medicated per emar. Complains of loose stools. Md notified during rounding.

## 2021-08-08 NOTE — SIGNIFICANT NOTE
08/08/21 0948   Plan   Final Discharge Disposition Code 01 - home or self-care   Final Note Patient to discharge home today.  will provide transportation. SW confirmed with [facility] on call that pt has continous oxygen at home. Pt stated her  can bring a tank for her to go home with. pt stated she also has a nebulizer at home that she got from her mother. Pt denied any discharge needs.

## 2021-08-08 NOTE — PROGRESS NOTES
[facility]   Hospitalist Progress Note  Date: 2021  Patient Name: Dang Hunt  : 1965  MRN: [id number]  Date of admission: 2021      Subjective   Subjective     Chief Complaint: Shortness of air    Summary:   Dang Hunt is a 56 y.o. female resents with complaints of shortness of breath.  She states she has been sick for approximately 10 days has had a temperature of 103.  Patient has had nausea lower abdominal pain dyspnea and diarrhea.  She tested positive for Covid on .  She presents to the ED today and was found to be hypoxic with O2 sats between 89 to 96%.  Patient has not been vaccinated for COVID-19.  Patient uses CPAP at night at home not aware of the pressure    Interval Followup:   91% saturation on three liters nasal cannula..  No home oxygen use.  At night uses 2 L of oxygen bleed into CPAP  Other vital signs stable.  Continues to have shortness of air.  Does have cough with dark mucus.  Does have loss of taste and smell but is improving.  Appetite is down.  Occasional diarrhea persist.  Abdominal cramps.  Complaining of headache thinks it is due to nebulizer treatment or steroids.  Want something for sore throat    Review of Systems   All systems were reviewed and negative except for: Summary and interval follow-up    Objective   Objective     Vitals:   Temp:  [97.5 °F (36.4 °C)-98.2 °F (36.8 °C)] 97.6 °F (36.4 °C)  Heart Rate:  [68-88] 86  Resp:  [15-18] 18  BP: (111-156)/(45-83) 156/78  Flow (L/min):  [3-3.5] 3.5  Physical Exam    Constitutional: Awake, alert, no acute distress   Eyes: Pupils equal, sclerae anicteric, no conjunctival injection   HENT: NCAT, mucous membranes moist   Neck: Supple, no thyromegaly, no lymphadenopathy, trachea midline   Respiratory: Rhonchi and decreased breath sounds bilaterally, nonlabored respirations    Cardiovascular: RRR, no murmurs, rubs, or gallops, palpable pedal pulses bilaterally   Gastrointestinal: Positive bowel sounds, soft,  nontender, nondistended   Musculoskeletal: No bilateral ankle edema, no clubbing or cyanosis to extremities   Psychiatric: Appropriate affect, cooperative   Neurologic: Oriented x 3, strength symmetric in all extremities, Cranial Nerves grossly intact to confrontation, speech clear   Skin: No rashes     Result Review    Result Review:  I have personally reviewed the results from the time of this admission to 8/8/2021 17:05 EDT and agree with these findings:  [x]  Laboratory  [x]  Microbiology  [x]  Radiology  [x]  EKG/Telemetry   []  Cardiology/Vascular   []  Pathology  [x]  Old records  [x]  Other: Medications  Assessment/Plan   Assessment / Plan     Assessment:  Acute hypoxic respiratory failure.  Patient does not use continuous oxygen at home.  COVID-19 pneumonia causing above.  Patient not vaccinated.  Likely community-acquired pneumonia.  NIDDM.  Hemoglobin A1c of 5.8%  Morbid obesity BMI 40.6.  Obstructive sleep apnea on home CPAP.  History of COPD.  Hypertension.  Hyperlipidemia.     Plan:  Continue supplemental oxygen keep sats more than 92%.  Prone ventilation encouraged.  Patient started on IV remdesivir day 4/5.  Continue IV Decadron.  Rocephin and Zithromax.  Nebulizer treatment with DuoNeb, Pulmicort and Brovana neb.  Bronchodilator and bronchopulmonary hygiene protocol.  Tussionex.  Sliding scale insulin.  IV Lasix.  As needed Toradol for the headache.  Resumed home medication including Effexor.  Await culture data.  MRSA DNA PCR, Legionella and strep pneumo antigen negative.  Blood culture negative at day 2.  Inflammatory markers improving continue to trend.  D-dimer and ESR negative.  CRP trending down  Procalcitonin and lactic negative.  Pepcid.  DVT prophylaxis with Lovenox    Discussed plan with RN.  Discharge to home canceled as still have elevated CRP.  And oxygen requirement remains at 3 L.  Will reassess in the morning for discharge home  DVT prophylaxis:  Medical DVT prophylaxis orders are  present.    CODE STATUS:   Level Of Support Discussed With: Patient  Code Status: CPR  Medical Interventions (Level of Support Prior to Arrest): Full      Part of this note may be an electronic transcription/translation of spoken language to printed text using the Dragon Dictation System.       Electronically signed by Markell Triana MD, 08/08/21, 5:08 PM EDT.

## 2021-08-09 ENCOUNTER — APPOINTMENT (OUTPATIENT)
Dept: GENERAL RADIOLOGY | Facility: HOSPITAL | Age: 56
End: 2021-08-09

## 2021-08-09 LAB
ALBUMIN SERPL-MCNC: 3.3 G/DL (ref 3.5–5.2)
ALBUMIN/GLOB SERPL: 0.9 G/DL
ALP SERPL-CCNC: 117 U/L (ref 39–117)
ALT SERPL W P-5'-P-CCNC: 18 U/L (ref 1–33)
ANION GAP SERPL CALCULATED.3IONS-SCNC: 10.1 MMOL/L (ref 5–15)
AST SERPL-CCNC: 27 U/L (ref 1–32)
BASOPHILS # BLD AUTO: 0.02 10*3/MM3 (ref 0–0.2)
BASOPHILS NFR BLD AUTO: 0.3 % (ref 0–1.5)
BILIRUB SERPL-MCNC: 0.3 MG/DL (ref 0–1.2)
BUN SERPL-MCNC: 19 MG/DL (ref 6–20)
BUN/CREAT SERPL: 21.1 (ref 7–25)
CALCIUM SPEC-SCNC: 8.6 MG/DL (ref 8.6–10.5)
CHLORIDE SERPL-SCNC: 103 MMOL/L (ref 98–107)
CO2 SERPL-SCNC: 24.9 MMOL/L (ref 22–29)
CREAT SERPL-MCNC: 0.9 MG/DL (ref 0.57–1)
CRP SERPL-MCNC: 3.78 MG/DL (ref 0–0.5)
D DIMER PPP FEU-MCNC: 0.53 MG/L (FEU) (ref 0–0.59)
D-LACTATE SERPL-SCNC: 2.4 MMOL/L (ref 0.5–2)
D-LACTATE SERPL-SCNC: 2.9 MMOL/L (ref 0.5–2)
DEPRECATED RDW RBC AUTO: 50 FL (ref 37–54)
EOSINOPHIL # BLD AUTO: 0 10*3/MM3 (ref 0–0.4)
EOSINOPHIL NFR BLD AUTO: 0 % (ref 0.3–6.2)
ERYTHROCYTE [DISTWIDTH] IN BLOOD BY AUTOMATED COUNT: 15.8 % (ref 12.3–15.4)
ERYTHROCYTE [SEDIMENTATION RATE] IN BLOOD: 27 MM/HR (ref 0–30)
FERRITIN SERPL-MCNC: 339.9 NG/ML (ref 13–150)
GFR SERPL CREATININE-BSD FRML MDRD: 65 ML/MIN/1.73
GLOBULIN UR ELPH-MCNC: 3.5 GM/DL
GLUCOSE SERPL-MCNC: 103 MG/DL (ref 65–99)
HCT VFR BLD AUTO: 40.7 % (ref 34–46.6)
HGB BLD-MCNC: 13.1 G/DL (ref 12–15.9)
IMM GRANULOCYTES # BLD AUTO: 0.06 10*3/MM3 (ref 0–0.05)
IMM GRANULOCYTES NFR BLD AUTO: 0.8 % (ref 0–0.5)
LYMPHOCYTES # BLD AUTO: 1.13 10*3/MM3 (ref 0.7–3.1)
LYMPHOCYTES NFR BLD AUTO: 14.2 % (ref 19.6–45.3)
MAGNESIUM SERPL-MCNC: 1.9 MG/DL (ref 1.6–2.6)
MCH RBC QN AUTO: 28.2 PG (ref 26.6–33)
MCHC RBC AUTO-ENTMCNC: 32.2 G/DL (ref 31.5–35.7)
MCV RBC AUTO: 87.5 FL (ref 79–97)
MONOCYTES # BLD AUTO: 0.28 10*3/MM3 (ref 0.1–0.9)
MONOCYTES NFR BLD AUTO: 3.5 % (ref 5–12)
NEUTROPHILS NFR BLD AUTO: 6.49 10*3/MM3 (ref 1.7–7)
NEUTROPHILS NFR BLD AUTO: 81.2 % (ref 42.7–76)
NRBC BLD AUTO-RTO: 0 /100 WBC (ref 0–0.2)
PHOSPHATE SERPL-MCNC: 2.8 MG/DL (ref 2.5–4.5)
PLATELET # BLD AUTO: 226 10*3/MM3 (ref 140–450)
PMV BLD AUTO: 9.9 FL (ref 6–12)
POTASSIUM SERPL-SCNC: 3.7 MMOL/L (ref 3.5–5.2)
PROCALCITONIN SERPL-MCNC: 0.05 NG/ML (ref 0–0.25)
PROT SERPL-MCNC: 6.8 G/DL (ref 6–8.5)
RBC # BLD AUTO: 4.65 10*6/MM3 (ref 3.77–5.28)
SODIUM SERPL-SCNC: 138 MMOL/L (ref 136–145)
WBC # BLD AUTO: 7.98 10*3/MM3 (ref 3.4–10.8)

## 2021-08-09 PROCEDURE — 86140 C-REACTIVE PROTEIN: CPT | Performed by: INTERNAL MEDICINE

## 2021-08-09 PROCEDURE — 85379 FIBRIN DEGRADATION QUANT: CPT | Performed by: INTERNAL MEDICINE

## 2021-08-09 PROCEDURE — 25010000002 FUROSEMIDE PER 20 MG: Performed by: INTERNAL MEDICINE

## 2021-08-09 PROCEDURE — 25010000002 AZITHROMYCIN PER 500 MG: Performed by: PHYSICIAN ASSISTANT

## 2021-08-09 PROCEDURE — 82728 ASSAY OF FERRITIN: CPT | Performed by: INTERNAL MEDICINE

## 2021-08-09 PROCEDURE — 25010000002 ENOXAPARIN PER 10 MG: Performed by: INTERNAL MEDICINE

## 2021-08-09 PROCEDURE — 25010000002 KETOROLAC TROMETHAMINE PER 15 MG: Performed by: INTERNAL MEDICINE

## 2021-08-09 PROCEDURE — 85652 RBC SED RATE AUTOMATED: CPT | Performed by: INTERNAL MEDICINE

## 2021-08-09 PROCEDURE — 83605 ASSAY OF LACTIC ACID: CPT | Performed by: INTERNAL MEDICINE

## 2021-08-09 PROCEDURE — 25010000002 CEFTRIAXONE PER 250 MG: Performed by: PHYSICIAN ASSISTANT

## 2021-08-09 PROCEDURE — 80053 COMPREHEN METABOLIC PANEL: CPT | Performed by: PHYSICIAN ASSISTANT

## 2021-08-09 PROCEDURE — 36415 COLL VENOUS BLD VENIPUNCTURE: CPT | Performed by: INTERNAL MEDICINE

## 2021-08-09 PROCEDURE — 25010000002 DEXAMETHASONE PER 1 MG: Performed by: INTERNAL MEDICINE

## 2021-08-09 PROCEDURE — 85025 COMPLETE CBC W/AUTO DIFF WBC: CPT | Performed by: PHYSICIAN ASSISTANT

## 2021-08-09 PROCEDURE — 94799 UNLISTED PULMONARY SVC/PX: CPT

## 2021-08-09 PROCEDURE — 99233 SBSQ HOSP IP/OBS HIGH 50: CPT | Performed by: INTERNAL MEDICINE

## 2021-08-09 PROCEDURE — 84100 ASSAY OF PHOSPHORUS: CPT | Performed by: INTERNAL MEDICINE

## 2021-08-09 PROCEDURE — 83735 ASSAY OF MAGNESIUM: CPT | Performed by: INTERNAL MEDICINE

## 2021-08-09 PROCEDURE — 84145 PROCALCITONIN (PCT): CPT | Performed by: INTERNAL MEDICINE

## 2021-08-09 PROCEDURE — 71045 X-RAY EXAM CHEST 1 VIEW: CPT

## 2021-08-09 RX ORDER — FUROSEMIDE 10 MG/ML
40 INJECTION INTRAMUSCULAR; INTRAVENOUS
Status: DISCONTINUED | OUTPATIENT
Start: 2021-08-10 | End: 2021-08-12

## 2021-08-09 RX ORDER — FUROSEMIDE 10 MG/ML
20 INJECTION INTRAMUSCULAR; INTRAVENOUS
Status: DISCONTINUED | OUTPATIENT
Start: 2021-08-09 | End: 2021-08-09

## 2021-08-09 RX ORDER — DEXAMETHASONE SODIUM PHOSPHATE 10 MG/ML
10 INJECTION INTRAMUSCULAR; INTRAVENOUS 2 TIMES DAILY
Status: DISCONTINUED | OUTPATIENT
Start: 2021-08-09 | End: 2021-08-13 | Stop reason: HOSPADM

## 2021-08-09 RX ORDER — IPRATROPIUM BROMIDE AND ALBUTEROL SULFATE 2.5; .5 MG/3ML; MG/3ML
3 SOLUTION RESPIRATORY (INHALATION) EVERY 4 HOURS PRN
Status: DISCONTINUED | OUTPATIENT
Start: 2021-08-09 | End: 2021-08-13 | Stop reason: HOSPADM

## 2021-08-09 RX ADMIN — FUROSEMIDE 20 MG: 10 INJECTION, SOLUTION INTRAMUSCULAR; INTRAVENOUS at 08:15

## 2021-08-09 RX ADMIN — SODIUM CHLORIDE, PRESERVATIVE FREE 10 ML: 5 INJECTION INTRAVENOUS at 20:42

## 2021-08-09 RX ADMIN — ENOXAPARIN SODIUM 40 MG: 40 INJECTION SUBCUTANEOUS at 15:34

## 2021-08-09 RX ADMIN — BENZOCAINE AND MENTHOL 1 LOZENGE: 15; 3.6 LOZENGE ORAL at 08:15

## 2021-08-09 RX ADMIN — FAMOTIDINE 20 MG: 20 TABLET ORAL at 06:38

## 2021-08-09 RX ADMIN — BUDESONIDE 0.5 MG: 0.5 INHALANT ORAL at 18:32

## 2021-08-09 RX ADMIN — BENZOCAINE AND MENTHOL 1 LOZENGE: 15; 3.6 LOZENGE ORAL at 20:43

## 2021-08-09 RX ADMIN — HYDROCODONE POLISTIREX AND CHLORPHENIRAMINE POLISTIREX 5 ML: 10; 8 SUSPENSION, EXTENDED RELEASE ORAL at 08:14

## 2021-08-09 RX ADMIN — AZITHROMYCIN 500 MG: 500 INJECTION, POWDER, LYOPHILIZED, FOR SOLUTION INTRAVENOUS at 10:10

## 2021-08-09 RX ADMIN — REMDESIVIR 100 MG: 100 INJECTION, POWDER, LYOPHILIZED, FOR SOLUTION INTRAVENOUS at 13:49

## 2021-08-09 RX ADMIN — CEFTRIAXONE 1 G: 10 INJECTION, POWDER, FOR SOLUTION INTRAVENOUS at 08:14

## 2021-08-09 RX ADMIN — VENLAFAXINE HYDROCHLORIDE 225 MG: 150 CAPSULE, EXTENDED RELEASE ORAL at 08:15

## 2021-08-09 RX ADMIN — ATORVASTATIN CALCIUM 20 MG: 20 TABLET, FILM COATED ORAL at 20:42

## 2021-08-09 RX ADMIN — BENZOCAINE AND MENTHOL 1 LOZENGE: 15; 3.6 LOZENGE ORAL at 16:07

## 2021-08-09 RX ADMIN — DEXAMETHASONE SODIUM PHOSPHATE 10 MG: 10 INJECTION INTRAMUSCULAR; INTRAVENOUS at 20:43

## 2021-08-09 RX ADMIN — ARFORMOTEROL TARTRATE 15 MCG: 15 SOLUTION RESPIRATORY (INHALATION) at 06:51

## 2021-08-09 RX ADMIN — KETOROLAC TROMETHAMINE 30 MG: 30 INJECTION, SOLUTION INTRAMUSCULAR; INTRAVENOUS at 05:05

## 2021-08-09 RX ADMIN — BUDESONIDE 0.5 MG: 0.5 INHALANT ORAL at 06:52

## 2021-08-09 RX ADMIN — ARFORMOTEROL TARTRATE 15 MCG: 15 SOLUTION RESPIRATORY (INHALATION) at 18:32

## 2021-08-09 RX ADMIN — KETOROLAC TROMETHAMINE 30 MG: 30 INJECTION, SOLUTION INTRAMUSCULAR; INTRAVENOUS at 15:34

## 2021-08-09 RX ADMIN — FAMOTIDINE 20 MG: 20 TABLET ORAL at 17:34

## 2021-08-09 RX ADMIN — GUAIFENESIN AND DEXTROMETHORPHAN 5 ML: 100; 10 SYRUP ORAL at 16:07

## 2021-08-09 RX ADMIN — MONTELUKAST SODIUM 10 MG: 10 TABLET, FILM COATED ORAL at 06:38

## 2021-08-09 RX ADMIN — DEXAMETHASONE SODIUM PHOSPHATE 10 MG: 10 INJECTION INTRAMUSCULAR; INTRAVENOUS at 08:15

## 2021-08-09 RX ADMIN — SODIUM CHLORIDE, PRESERVATIVE FREE 10 ML: 5 INJECTION INTRAVENOUS at 08:14

## 2021-08-09 RX ADMIN — IPRATROPIUM BROMIDE AND ALBUTEROL SULFATE 3 ML: 2.5; .5 SOLUTION RESPIRATORY (INHALATION) at 06:51

## 2021-08-09 RX ADMIN — POTASSIUM CHLORIDE 40 MEQ: 10 CAPSULE, COATED, EXTENDED RELEASE ORAL at 08:15

## 2021-08-09 RX ADMIN — GUAIFENESIN AND DEXTROMETHORPHAN 5 ML: 100; 10 SYRUP ORAL at 05:05

## 2021-08-09 RX ADMIN — FUROSEMIDE 20 MG: 10 INJECTION, SOLUTION INTRAVENOUS at 17:34

## 2021-08-09 RX ADMIN — HYDROCODONE POLISTIREX AND CHLORPHENIRAMINE POLISTIREX 5 ML: 10; 8 SUSPENSION, EXTENDED RELEASE ORAL at 20:43

## 2021-08-09 NOTE — PLAN OF CARE
Goal Outcome Evaluation:  Plan of Care Reviewed With: patient     PT HAD TO BE INCREASED TO 6L HI-FLOW NC TONIGHT DUE TO SATS DROPPING AND MAINTAINING AROUND 85%. DENIES INCREASED SOA. WORE CPAP MOST OF NIGHT. OTHER VSS. TORADOL AND FIORCET EACH X 1 FOR C/O PAIN.

## 2021-08-09 NOTE — PROGRESS NOTES
[facility]   Hospitalist Progress Note  Date: 2021  Patient Name: Dang Hunt  : 1965  MRN: [id number]  Date of admission: 2021      Subjective   Subjective     Chief Complaint: Shortness of air    Summary:   Dang Hunt is a 56 y.o. female resents with complaints of shortness of breath.  She states she has been sick for approximately 10 days has had a temperature of 103.  Patient has had nausea lower abdominal pain dyspnea and diarrhea.  She tested positive for Covid on .  She presents to the ED today and was found to be hypoxic with O2 sats between 89 to 96%.  Patient has not been vaccinated for COVID-19.  Patient uses CPAP at night at home not aware of the pressure    Interval Followup:   Patient on 10 L high flow nasal cannula to keep sats more than 90%.  No home oxygen use.  At night uses 2 L of oxygen bleed into CPAP  Other vital signs stable.  Continues to have shortness of air.  Does have cough with dark mucus.  Does have loss of taste and smell but is improving.  Appetite is down.  Occasional diarrhea persist.  Abdominal cramps.  Complaining of headache thinks it is due to nebulizer treatment or steroids.  Want something for sore throat.  Cepacol not working    Review of Systems   All systems were reviewed and negative except for: Summary and interval follow-up    Objective   Objective     Vitals:   Temp:  [97.6 °F (36.4 °C)-98 °F (36.7 °C)] 97.6 °F (36.4 °C)  Heart Rate:  [69-81] 80  Resp:  [16-24] 18  BP: (108-156)/(61-88) 156/88  Flow (L/min):  [6-10] 10  Physical Exam    Constitutional: Awake, alert, no acute distress   Eyes: Pupils equal, sclerae anicteric, no conjunctival injection   HENT: NCAT, mucous membranes moist   Neck: Supple, no thyromegaly, no lymphadenopathy, trachea midline   Respiratory: Rhonchi and decreased breath sounds bilaterally, nonlabored respirations    Cardiovascular: RRR, no murmurs, rubs, or gallops, palpable pedal pulses  bilaterally   Gastrointestinal: Positive bowel sounds, soft, nontender, nondistended   Musculoskeletal: No bilateral ankle edema, no clubbing or cyanosis to extremities   Psychiatric: Appropriate affect, cooperative   Neurologic: Oriented x 3, strength symmetric in all extremities, Cranial Nerves grossly intact to confrontation, speech clear   Skin: No rashes     Result Review    Result Review:  I have personally reviewed the results from the time of this admission to 8/9/2021 19:04 EDT and agree with these findings:  [x]  Laboratory  [x]  Microbiology  [x]  Radiology  [x]  EKG/Telemetry   []  Cardiology/Vascular   []  Pathology  [x]  Old records  [x]  Other: Medications  Assessment/Plan   Assessment / Plan     Assessment:  Acute hypoxic respiratory failure.  Patient does not use continuous oxygen at home.  COVID-19 pneumonia causing above.  Patient not vaccinated.  Likely community-acquired pneumonia.  NIDDM.  Hemoglobin A1c of 5.8%  Morbid obesity BMI 40.6.  Obstructive sleep apnea on home CPAP.  History of COPD.  Hypertension.  Hyperlipidemia.     Plan:  Chest x-ray worsening infiltrate  No Actemra available due to shortage  We will consult pulmonary to start her on baricitinib  Continue supplemental oxygen keep sats more than 92%.  Prone ventilation encouraged.  Patient started on IV remdesivir day 4/5.  Continue IV Decadron.  Increased to 10 mg twice a day  Rocephin and Zithromax.  Nebulizer treatment with DuoNeb, Pulmicort and Brovana neb.  Bronchodilator and bronchopulmonary hygiene protocol.  Tussionex.  Sliding scale insulin.  IV Lasix dose increased.  Chloraseptic and Cepacol for sore throat  As needed Toradol for the headache.  Resumed home medication including Effexor.  Await culture data.  MRSA DNA PCR, Legionella and strep pneumo antigen negative.  Blood culture negative at day 2.  Inflammatory markers improving continue to trend.  D-dimer and ESR negative.  CRP trending down  Procalcitonin  negative.   Lactic acid trending up  Pepcid.  DVT prophylaxis with Lovenox    Discussed plan with RN.  Discharge to home canceled     DVT prophylaxis:  Medical DVT prophylaxis orders are present.    CODE STATUS:   Level Of Support Discussed With: Patient  Code Status: CPR  Medical Interventions (Level of Support Prior to Arrest): Full      Part of this note may be an electronic transcription/translation of spoken language to printed text using the Dragon Dictation System.         Electronically signed by Markell Triana MD, 08/09/21, 7:06 PM EDT.  .

## 2021-08-09 NOTE — PLAN OF CARE
Goal Outcome Evaluation:               Pt stable this shift. Requested discharge. Will stay one more day per MD. Still complains of headache, medicated per emar. Discussed dc plan with VERITO Mcgregor. Pt uses [facility] and has continuous oxygen use at home. May need o2 tank on dc.

## 2021-08-09 NOTE — PLAN OF CARE
Goal Outcome Evaluation:      Oxygen increased to 10L high flow with saturations 90-91%, encouraged pt to prone as much as possible. Enouraged to cough and deep breathe.

## 2021-08-10 PROBLEM — U07.1 COVID-19 VIRUS INFECTION: Status: ACTIVE | Noted: 2021-08-10

## 2021-08-10 LAB
ALBUMIN SERPL-MCNC: 3.1 G/DL (ref 3.5–5.2)
ALBUMIN/GLOB SERPL: 0.9 G/DL
ALP SERPL-CCNC: 124 U/L (ref 39–117)
ALT SERPL W P-5'-P-CCNC: 17 U/L (ref 1–33)
ANION GAP SERPL CALCULATED.3IONS-SCNC: 10 MMOL/L (ref 5–15)
AST SERPL-CCNC: 20 U/L (ref 1–32)
BACTERIA SPEC AEROBE CULT: NORMAL
BASOPHILS # BLD AUTO: 0.01 10*3/MM3 (ref 0–0.2)
BASOPHILS NFR BLD AUTO: 0.2 % (ref 0–1.5)
BILIRUB SERPL-MCNC: 0.3 MG/DL (ref 0–1.2)
BUN SERPL-MCNC: 20 MG/DL (ref 6–20)
BUN/CREAT SERPL: 22.7 (ref 7–25)
CALCIUM SPEC-SCNC: 8.5 MG/DL (ref 8.6–10.5)
CHLORIDE SERPL-SCNC: 100 MMOL/L (ref 98–107)
CO2 SERPL-SCNC: 27 MMOL/L (ref 22–29)
CREAT SERPL-MCNC: 0.88 MG/DL (ref 0.57–1)
CRP SERPL-MCNC: 2.29 MG/DL (ref 0–0.5)
DEPRECATED RDW RBC AUTO: 49.5 FL (ref 37–54)
EOSINOPHIL # BLD AUTO: 0 10*3/MM3 (ref 0–0.4)
EOSINOPHIL NFR BLD AUTO: 0 % (ref 0.3–6.2)
ERYTHROCYTE [DISTWIDTH] IN BLOOD BY AUTOMATED COUNT: 15.6 % (ref 12.3–15.4)
ERYTHROCYTE [SEDIMENTATION RATE] IN BLOOD: 25 MM/HR (ref 0–30)
FERRITIN SERPL-MCNC: 264.4 NG/ML (ref 13–150)
GFR SERPL CREATININE-BSD FRML MDRD: 67 ML/MIN/1.73
GLOBULIN UR ELPH-MCNC: 3.3 GM/DL
GLUCOSE SERPL-MCNC: 127 MG/DL (ref 65–99)
HCT VFR BLD AUTO: 40.4 % (ref 34–46.6)
HGB BLD-MCNC: 12.9 G/DL (ref 12–15.9)
IMM GRANULOCYTES # BLD AUTO: 0.08 10*3/MM3 (ref 0–0.05)
IMM GRANULOCYTES NFR BLD AUTO: 1.3 % (ref 0–0.5)
LYMPHOCYTES # BLD AUTO: 1.17 10*3/MM3 (ref 0.7–3.1)
LYMPHOCYTES NFR BLD AUTO: 19.3 % (ref 19.6–45.3)
MAGNESIUM SERPL-MCNC: 1.9 MG/DL (ref 1.6–2.6)
MCH RBC QN AUTO: 27.7 PG (ref 26.6–33)
MCHC RBC AUTO-ENTMCNC: 31.9 G/DL (ref 31.5–35.7)
MCV RBC AUTO: 86.7 FL (ref 79–97)
MONOCYTES # BLD AUTO: 0.24 10*3/MM3 (ref 0.1–0.9)
MONOCYTES NFR BLD AUTO: 4 % (ref 5–12)
NEUTROPHILS NFR BLD AUTO: 4.57 10*3/MM3 (ref 1.7–7)
NEUTROPHILS NFR BLD AUTO: 75.2 % (ref 42.7–76)
NRBC BLD AUTO-RTO: 0 /100 WBC (ref 0–0.2)
PHOSPHATE SERPL-MCNC: 3 MG/DL (ref 2.5–4.5)
PLATELET # BLD AUTO: 252 10*3/MM3 (ref 140–450)
PMV BLD AUTO: 10.5 FL (ref 6–12)
POTASSIUM SERPL-SCNC: 4 MMOL/L (ref 3.5–5.2)
PROT SERPL-MCNC: 6.4 G/DL (ref 6–8.5)
RBC # BLD AUTO: 4.66 10*6/MM3 (ref 3.77–5.28)
RBC MORPH BLD: NORMAL
SMALL PLATELETS BLD QL SMEAR: ADEQUATE
SODIUM SERPL-SCNC: 137 MMOL/L (ref 136–145)
WBC # BLD AUTO: 6.07 10*3/MM3 (ref 3.4–10.8)
WBC MORPH BLD: NORMAL

## 2021-08-10 PROCEDURE — 82728 ASSAY OF FERRITIN: CPT | Performed by: INTERNAL MEDICINE

## 2021-08-10 PROCEDURE — 84100 ASSAY OF PHOSPHORUS: CPT | Performed by: INTERNAL MEDICINE

## 2021-08-10 PROCEDURE — 25010000002 CEFTRIAXONE PER 250 MG: Performed by: PHYSICIAN ASSISTANT

## 2021-08-10 PROCEDURE — 25010000002 AZITHROMYCIN PER 500 MG: Performed by: PHYSICIAN ASSISTANT

## 2021-08-10 PROCEDURE — 86140 C-REACTIVE PROTEIN: CPT | Performed by: INTERNAL MEDICINE

## 2021-08-10 PROCEDURE — 99223 1ST HOSP IP/OBS HIGH 75: CPT | Performed by: INTERNAL MEDICINE

## 2021-08-10 PROCEDURE — 25010000002 KETOROLAC TROMETHAMINE PER 15 MG: Performed by: INTERNAL MEDICINE

## 2021-08-10 PROCEDURE — 94799 UNLISTED PULMONARY SVC/PX: CPT

## 2021-08-10 PROCEDURE — 25010000002 FUROSEMIDE PER 20 MG: Performed by: INTERNAL MEDICINE

## 2021-08-10 PROCEDURE — 85652 RBC SED RATE AUTOMATED: CPT | Performed by: INTERNAL MEDICINE

## 2021-08-10 PROCEDURE — 85025 COMPLETE CBC W/AUTO DIFF WBC: CPT | Performed by: PHYSICIAN ASSISTANT

## 2021-08-10 PROCEDURE — 80053 COMPREHEN METABOLIC PANEL: CPT | Performed by: PHYSICIAN ASSISTANT

## 2021-08-10 PROCEDURE — 94660 CPAP INITIATION&MGMT: CPT

## 2021-08-10 PROCEDURE — 85007 BL SMEAR W/DIFF WBC COUNT: CPT | Performed by: PHYSICIAN ASSISTANT

## 2021-08-10 PROCEDURE — 25010000002 ENOXAPARIN PER 10 MG: Performed by: INTERNAL MEDICINE

## 2021-08-10 PROCEDURE — 83735 ASSAY OF MAGNESIUM: CPT | Performed by: INTERNAL MEDICINE

## 2021-08-10 PROCEDURE — 25010000002 DEXAMETHASONE PER 1 MG: Performed by: INTERNAL MEDICINE

## 2021-08-10 PROCEDURE — 99233 SBSQ HOSP IP/OBS HIGH 50: CPT | Performed by: INTERNAL MEDICINE

## 2021-08-10 RX ADMIN — ARFORMOTEROL TARTRATE 15 MCG: 15 SOLUTION RESPIRATORY (INHALATION) at 20:38

## 2021-08-10 RX ADMIN — CEFTRIAXONE 1 G: 10 INJECTION, POWDER, FOR SOLUTION INTRAVENOUS at 09:00

## 2021-08-10 RX ADMIN — BENZOCAINE AND MENTHOL 1 LOZENGE: 15; 3.6 LOZENGE ORAL at 09:00

## 2021-08-10 RX ADMIN — HYDROCODONE POLISTIREX AND CHLORPHENIRAMINE POLISTIREX 5 ML: 10; 8 SUSPENSION, EXTENDED RELEASE ORAL at 20:09

## 2021-08-10 RX ADMIN — POTASSIUM CHLORIDE 40 MEQ: 10 CAPSULE, COATED, EXTENDED RELEASE ORAL at 08:59

## 2021-08-10 RX ADMIN — FUROSEMIDE 40 MG: 10 INJECTION, SOLUTION INTRAMUSCULAR; INTRAVENOUS at 17:18

## 2021-08-10 RX ADMIN — BUDESONIDE 0.5 MG: 0.5 INHALANT ORAL at 20:38

## 2021-08-10 RX ADMIN — VENLAFAXINE HYDROCHLORIDE 225 MG: 150 CAPSULE, EXTENDED RELEASE ORAL at 08:59

## 2021-08-10 RX ADMIN — DEXAMETHASONE SODIUM PHOSPHATE 10 MG: 10 INJECTION INTRAMUSCULAR; INTRAVENOUS at 20:09

## 2021-08-10 RX ADMIN — ENOXAPARIN SODIUM 40 MG: 40 INJECTION SUBCUTANEOUS at 17:18

## 2021-08-10 RX ADMIN — REMDESIVIR 100 MG: 100 INJECTION, POWDER, LYOPHILIZED, FOR SOLUTION INTRAVENOUS at 13:07

## 2021-08-10 RX ADMIN — FUROSEMIDE 40 MG: 10 INJECTION, SOLUTION INTRAMUSCULAR; INTRAVENOUS at 09:00

## 2021-08-10 RX ADMIN — HYDROCODONE POLISTIREX AND CHLORPHENIRAMINE POLISTIREX 5 ML: 10; 8 SUSPENSION, EXTENDED RELEASE ORAL at 09:00

## 2021-08-10 RX ADMIN — BENZOCAINE AND MENTHOL 1 LOZENGE: 15; 3.6 LOZENGE ORAL at 20:09

## 2021-08-10 RX ADMIN — BUDESONIDE 0.5 MG: 0.5 INHALANT ORAL at 09:48

## 2021-08-10 RX ADMIN — KETOROLAC TROMETHAMINE 30 MG: 30 INJECTION, SOLUTION INTRAMUSCULAR; INTRAVENOUS at 14:18

## 2021-08-10 RX ADMIN — FAMOTIDINE 20 MG: 20 TABLET ORAL at 17:18

## 2021-08-10 RX ADMIN — ATORVASTATIN CALCIUM 20 MG: 20 TABLET, FILM COATED ORAL at 20:09

## 2021-08-10 RX ADMIN — DEXAMETHASONE SODIUM PHOSPHATE 10 MG: 10 INJECTION INTRAMUSCULAR; INTRAVENOUS at 09:00

## 2021-08-10 RX ADMIN — MONTELUKAST SODIUM 10 MG: 10 TABLET, FILM COATED ORAL at 06:28

## 2021-08-10 RX ADMIN — BARICITINIB 4 MG: 2 TABLET, FILM COATED ORAL at 13:10

## 2021-08-10 RX ADMIN — AZITHROMYCIN 500 MG: 500 INJECTION, POWDER, LYOPHILIZED, FOR SOLUTION INTRAVENOUS at 09:01

## 2021-08-10 RX ADMIN — FAMOTIDINE 20 MG: 20 TABLET ORAL at 06:30

## 2021-08-10 RX ADMIN — ARFORMOTEROL TARTRATE 15 MCG: 15 SOLUTION RESPIRATORY (INHALATION) at 09:48

## 2021-08-10 RX ADMIN — SODIUM CHLORIDE, PRESERVATIVE FREE 10 ML: 5 INJECTION INTRAVENOUS at 20:08

## 2021-08-10 RX ADMIN — BENZOCAINE AND MENTHOL 1 LOZENGE: 15; 3.6 LOZENGE ORAL at 17:17

## 2021-08-10 NOTE — PROGRESS NOTES
[facility]   Hospitalist Progress Note  Date: 8/10/2021  Patient Name: Dang Hunt  : 1965  MRN: [id number]  Date of admission: 2021      Subjective   Subjective     Chief Complaint: Shortness of air    Summary:   Dang Hunt is a 56 y.o. female resents with complaints of shortness of breath.  She states she has been sick for approximately 10 days has had a temperature of 103.  Patient has had nausea lower abdominal pain dyspnea and diarrhea.  She tested positive for Covid on .  She presents to the ED today and was found to be hypoxic with O2 sats between 89 to 96%.  Patient has not been vaccinated for COVID-19.  Patient uses CPAP at night at home not aware of the pressure    Interval Followup:   Patient on 11 L high flow nasal cannula to keep sats more than 90%.  No home oxygen use.  At night uses 2 L of oxygen bled into CPAP  Other vital signs stable.  Continues to have shortness of air.  Does have cough with dark mucus.  Occasional blood-tinged mucus.  Does have loss of taste and smell but is improving.  Appetite is down.  No more diarrhea or abdominal cramps  Sore throat better    Review of Systems   All systems were reviewed and negative except for: Summary and interval follow-up    Objective   Objective     Vitals:   Temp:  [97.3 °F (36.3 °C)-97.8 °F (36.6 °C)] 97.3 °F (36.3 °C)  Heart Rate:  [65-79] 71  Resp:  [18-24] 18  BP: (132-159)/(75-88) 132/75  Flow (L/min):  [10-11] 11  Physical Exam    Constitutional: Awake, alert, no acute distress   Eyes: Pupils equal, sclerae anicteric, no conjunctival injection   HENT: NCAT, mucous membranes moist   Neck: Supple, no thyromegaly, no lymphadenopathy, trachea midline   Respiratory: Rhonchi and decreased breath sounds bilaterally, nonlabored respirations    Cardiovascular: RRR, no murmurs, rubs, or gallops, palpable pedal pulses bilaterally   Gastrointestinal: Positive bowel sounds, soft, nontender, nondistended   Musculoskeletal: No  bilateral ankle edema, no clubbing or cyanosis to extremities   Psychiatric: Appropriate affect, cooperative   Neurologic: Oriented x 3, strength symmetric in all extremities, Cranial Nerves grossly intact to confrontation, speech clear   Skin: No rashes     Result Review    Result Review:  I have personally reviewed the results from the time of this admission to 8/10/2021 18:51 EDT and agree with these findings:  [x]  Laboratory  [x]  Microbiology  [x]  Radiology  [x]  EKG/Telemetry   []  Cardiology/Vascular   []  Pathology  [x]  Old records  [x]  Other: Medications  Assessment/Plan   Assessment / Plan     Assessment:  Acute hypoxic respiratory failure.  Patient does not use continuous oxygen at home.  COVID-19 pneumonia causing above.  Patient not vaccinated.  Likely community-acquired pneumonia.  NIDDM.  Hemoglobin A1c of 5.8%  Morbid obesity BMI 40.6.  Obstructive sleep apnea on home CPAP with nocturnal oxygen.  History of COPD.  Hypertension.  Hyperlipidemia.     Plan:  Chest x-ray worsening infiltrate  No Actemra available due to shortage  Appreciate pulmonary input.  Started on baricitinib  Continue supplemental oxygen keep sats more than 92%.  Prone ventilation encouraged.  Finished 5 days of IV remdesivir August 10  Continue IV Decadron.  Increased to 10 mg twice a day  Rocephin and Zithromax.  Nebulizer treatment with DuoNeb, Pulmicort and Brovana neb.  Bronchodilator and bronchopulmonary hygiene protocol.  Tussionex.  Sliding scale insulin.  IV Lasix .  Chloraseptic and Cepacol for sore throat  As needed Toradol for the headache.  Resumed home medication including Effexor.  Await culture data.  MRSA DNA PCR, Legionella and strep pneumo antigen negative.  Blood culture negative a.  Inflammatory markers improving continue to trend.  D-dimer and ESR negative.  CRP trending down  Procalcitonin  negative.  Lactic acid trending up  Pepcid.  DVT prophylaxis with Lovenox.  Dose adjusted per pharmacy for  weight.    Discussed plan with RN and .      DVT prophylaxis:  Medical DVT prophylaxis orders are present.    CODE STATUS:   Level Of Support Discussed With: Patient  Code Status: CPR  Medical Interventions (Level of Support Prior to Arrest): Full      Part of this note may be an electronic transcription/translation of spoken language to printed text using the Dragon Dictation System.         Electronically signed by Markell Triana MD, 08/10/21, 6:54 PM EDT.      .

## 2021-08-10 NOTE — PLAN OF CARE
Goal Outcome Evaluation:           Progress: no change  Outcome Summary: Patient slept well through the night. No significant changes. Vital signs stable

## 2021-08-10 NOTE — PLAN OF CARE
Problem: Adult Inpatient Plan of Care  Goal: Plan of Care Review  Outcome: Ongoing, Progressing  Goal: Patient-Specific Goal (Individualized)  Outcome: Ongoing, Progressing  Goal: Absence of Hospital-Acquired Illness or Injury  Outcome: Ongoing, Progressing  Goal: Optimal Comfort and Wellbeing  Outcome: Ongoing, Progressing  Goal: Readiness for Transition of Care  Outcome: Ongoing, Progressing   Goal Outcome Evaluation:               Patient on 11L/highflow. Vital signs stable. Pulmonary consulted. See discharge planning notes.

## 2021-08-10 NOTE — CONSULTS
Pulmonary / Critical Care Consult Note      Patient Name: Dang Hunt  : 1965  MRN: [id number]  Primary Care Physician:  Dayna Sylvester APRN  Referring Physician: Markell Triana MD  Date of admission: 2021    Subjective   Subjective     Reason for Consult/ Chief Complaint:   COVID-19 pneumonia, hypoxia    HPI:  Dang Hunt is a 56 y.o. female came in with 8 to 10 days of fever, cough, chills and shortness of breath.  She tested positive for COVID-19 on .  She was having worsening respiratory symptoms for a number of days and then came into the ER with O2 saturations in the low 80s on room air.  She is since then progressed to the point where she is on 11 L of oxygen.  She has a hacking cough with yellow sputum, wheezing and dyspnea.  She denies any chest pain or hemoptysis.  She is weak and fatigued.  Given her high oxygen requirements, I have been asked about starting barcitinib    Review of Systems  Constitutional symptoms: Fatigue, otherwise Denied complaints   Ear, nose, throat: Denied complaints  Cardiovascular:  Denied complaints  Respiratory: Dyspnea, cough, otherwise denied complaints  Gastrointestinal: Denied complaints  Musculoskeletal: Weakness, otherwise denied complaints  Genitourinary: Denied complaints  Allergy / Immunology: Denied complaints  Hematologic: Denied complaints  Neurologic: Denied complaints  Skin: Denied complaints  Endocrine: Denied complaints  Psychiatric: Denied complaints      Personal History     Past Medical History:   Diagnosis Date   • Arthritis    • Asthma    • CHF (congestive heart failure) (CMS/[facility])    • Colitis    • COPD (chronic obstructive pulmonary disease) (CMS/[facility])    • Depression    • Dysphagia    • Edema    • Fatigue    • GERD (gastroesophageal reflux disease)    • History of acute pancreatitis    • History of histoplasmosis    • HTN (hypertension)    • Hyperlipidemia    • Light headed    • Migraines    • Multiple joint pain    • On  home oxygen therapy     2 L AT NIGHT   • MANI (obstructive sleep apnea)     WEARS CPAP   • RLS (restless legs syndrome)        Past Surgical History:   Procedure Laterality Date   • BACK SURGERY     • BLADDER SUSPENSION     • BREAST BIOPSY Left    • COLONOSCOPY     • GASTRIC SLEEVE LAPAROSCOPIC N/A 5/15/2017    Procedure: GASTRIC SLEEVE LAPAROSCOPIC;  Surgeon: Edenilson Hannon Jr., MD;  Location: [facility] OR [facility];  Service:    • HYSTERECTOMY     • LAPAROSCOPIC CHOLECYSTECTOMY     • LUNG BIOPSY     • NECK SURGERY     • OOPHORECTOMY         Family History: family history includes Heart attack in her maternal grandmother; Hypertension in her maternal grandmother, mother, and sister; Obesity in her maternal grandmother, mother, and sister; Stroke in her maternal grandmother. Otherwise pertinent FHx was reviewed and not pertinent to current issue.    Social History:  reports that she quit smoking about 9 years ago. Her smoking use included cigarettes. She has a 25.00 pack-year smoking history. She has never used smokeless tobacco. She reports that she does not drink alcohol and does not use drugs.    Home Medications:  albuterol, atorvastatin, fluticasone-salmeterol, furosemide, metoprolol tartrate, montelukast, potassium chloride, and venlafaxine XR    Allergies:  No Known Allergies    Objective    Objective     Vitals:   Temp:  [97.3 °F (36.3 °C)-97.8 °F (36.6 °C)] 97.6 °F (36.4 °C)  Heart Rate:  [65-81] 75  Resp:  [18-24] 20  BP: (136-159)/(80-88) 140/80  Flow (L/min):  [10-11] 11    Physical Exam:  Vital Signs Reviewed   WDWN, Alert, NAD.    HEENT:  PERRL, EOMI.  OP, nares clear, no sinus tenderness  Neck:  Supple, no JVD, no thyromegaly  Lymph: no axillary, cervical, supraclavicular lymphadenopathy noted bilaterally  Chest:  good aeration, crackles and rhonchi bilaterally, tympanic to percussion bilaterally, no work of breathing noted  CV: RRR, no MGR, pulses 2+, equal.  Abd:  Soft, NT, ND, + BS, no HSM, obese  EXT:   no clubbing, no cyanosis, trace BLE edema, no joint tenderness  Neuro:  A&Ox3, CN grossly intact, no focal deficits.  Skin: No rashes or lesions noted      Result Review    Result Review:  I have personally reviewed the results from the time of this admission to 8/10/2021 11:58 EDT and agree with these findings:  [x]  Laboratory  [x]  Microbiology  [x]  Radiology  [x]  EKG/Telemetry   [x]  Cardiology/Vascular   []  Pathology  []  Old records  []  Other:      Assessment/Plan   Assessment / Plan     Active Hospital Problems:  Active Hospital Problems    Diagnosis    • COVID-19 virus infection    • Acute respiratory failure with hypoxia (CMS/[facility])        Impression:  Acute hypoxemic respiratory failure  COVID-19 pneumonia  Acute exacerbation of COPD  Therapeutic drug monitoring of remdesivir  Obstructive sleep apnea on nighttime CPAP with 2 L of oxygen bled in  Morbid obesity with BMI 40.69    Plan:  Complete 5 days of remdesivir.  Check daily CMP for therapeutic drug monitoring  Given COVID-19 and progressively worsening hypoxemia, add barcitinib.  Consent obtained from patient.  Discussed emergency authorization use as well as risk versus benefits  Continue Decadron 10 mg IV twice daily  Continue nebulizers and bronchopulmonary hygiene  Continue Lasix 40 mg IV twice daily.  Trend renal function and electrolytes infection work-up negative.  Discontinue antibiotics  Encourage prone positioning.  Otherwise, get patient out of bed and into chair  Encourage incentive spirometer use  Continue NIPPV nightly with naps on current settings  Wean O2 to keep SPO2 greater than 90%  Trend inflammatory markers    DVT prophylaxis:  Medical DVT prophylaxis orders are present.     Code Status and Medical Interventions:   Ordered at: 08/06/21 0342     Level Of Support Discussed With:    Patient     Code Status:    CPR     Medical Interventions (Level of Support Prior to Arrest):    Full        Labs, imaging, microbiology notes and  medications personally reviewed.  Discussed with primary    Thank you for involving me in the care of this patient.    Electronically signed by Skyler Gonzalez MD, 08/10/21, 12:00 PM EDT.

## 2021-08-11 LAB
ALBUMIN SERPL-MCNC: 3.1 G/DL (ref 3.5–5.2)
ALBUMIN/GLOB SERPL: 1 G/DL
ALP SERPL-CCNC: 120 U/L (ref 39–117)
ALT SERPL W P-5'-P-CCNC: 16 U/L (ref 1–33)
ANION GAP SERPL CALCULATED.3IONS-SCNC: 7.6 MMOL/L (ref 5–15)
AST SERPL-CCNC: 15 U/L (ref 1–32)
BACTERIA SPEC AEROBE CULT: NORMAL
BILIRUB SERPL-MCNC: 0.4 MG/DL (ref 0–1.2)
BUN SERPL-MCNC: 29 MG/DL (ref 6–20)
BUN/CREAT SERPL: 31.9 (ref 7–25)
CALCIUM SPEC-SCNC: 8.4 MG/DL (ref 8.6–10.5)
CHLORIDE SERPL-SCNC: 100 MMOL/L (ref 98–107)
CO2 SERPL-SCNC: 29.4 MMOL/L (ref 22–29)
CREAT SERPL-MCNC: 0.91 MG/DL (ref 0.57–1)
CRP SERPL-MCNC: 0.94 MG/DL (ref 0–0.5)
D DIMER PPP FEU-MCNC: 1.64 MG/L (FEU) (ref 0–0.59)
D-LACTATE SERPL-SCNC: 1.3 MMOL/L (ref 0.5–2)
DEPRECATED RDW RBC AUTO: 48.6 FL (ref 37–54)
ERYTHROCYTE [DISTWIDTH] IN BLOOD BY AUTOMATED COUNT: 15.4 % (ref 12.3–15.4)
ERYTHROCYTE [SEDIMENTATION RATE] IN BLOOD: 17 MM/HR (ref 0–30)
FERRITIN SERPL-MCNC: 251.8 NG/ML (ref 13–150)
GFR SERPL CREATININE-BSD FRML MDRD: 64 ML/MIN/1.73
GLOBULIN UR ELPH-MCNC: 3.2 GM/DL
GLUCOSE SERPL-MCNC: 146 MG/DL (ref 65–99)
HCT VFR BLD AUTO: 40.7 % (ref 34–46.6)
HGB BLD-MCNC: 13 G/DL (ref 12–15.9)
LYMPHOCYTES # BLD MANUAL: 0.94 10*3/MM3 (ref 0.7–3.1)
LYMPHOCYTES NFR BLD MANUAL: 3 % (ref 5–12)
LYMPHOCYTES NFR BLD MANUAL: 7 % (ref 19.6–45.3)
MAGNESIUM SERPL-MCNC: 2 MG/DL (ref 1.6–2.6)
MCH RBC QN AUTO: 27.5 PG (ref 26.6–33)
MCHC RBC AUTO-ENTMCNC: 31.9 G/DL (ref 31.5–35.7)
MCV RBC AUTO: 86.2 FL (ref 79–97)
MONOCYTES # BLD AUTO: 0.19 10*3/MM3 (ref 0.1–0.9)
NEUTROPHILS # BLD AUTO: 5.12 10*3/MM3 (ref 1.7–7)
NEUTROPHILS NFR BLD MANUAL: 79 % (ref 42.7–76)
NEUTS BAND NFR BLD MANUAL: 3 % (ref 0–5)
PHOSPHATE SERPL-MCNC: 3.7 MG/DL (ref 2.5–4.5)
PLATELET # BLD AUTO: 285 10*3/MM3 (ref 140–450)
PMV BLD AUTO: 10.3 FL (ref 6–12)
POTASSIUM SERPL-SCNC: 4.1 MMOL/L (ref 3.5–5.2)
PROCALCITONIN SERPL-MCNC: 0.03 NG/ML (ref 0–0.25)
PROT SERPL-MCNC: 6.3 G/DL (ref 6–8.5)
RBC # BLD AUTO: 4.72 10*6/MM3 (ref 3.77–5.28)
RBC MORPH BLD: NORMAL
SMALL PLATELETS BLD QL SMEAR: ADEQUATE
SODIUM SERPL-SCNC: 137 MMOL/L (ref 136–145)
VARIANT LYMPHS NFR BLD MANUAL: 8 % (ref 0–5)
WBC # BLD AUTO: 6.24 10*3/MM3 (ref 3.4–10.8)
WBC MORPH BLD: NORMAL

## 2021-08-11 PROCEDURE — 94660 CPAP INITIATION&MGMT: CPT

## 2021-08-11 PROCEDURE — 83735 ASSAY OF MAGNESIUM: CPT | Performed by: INTERNAL MEDICINE

## 2021-08-11 PROCEDURE — 84145 PROCALCITONIN (PCT): CPT | Performed by: INTERNAL MEDICINE

## 2021-08-11 PROCEDURE — 85007 BL SMEAR W/DIFF WBC COUNT: CPT | Performed by: PHYSICIAN ASSISTANT

## 2021-08-11 PROCEDURE — 80053 COMPREHEN METABOLIC PANEL: CPT | Performed by: INTERNAL MEDICINE

## 2021-08-11 PROCEDURE — 25010000002 ENOXAPARIN PER 10 MG: Performed by: INTERNAL MEDICINE

## 2021-08-11 PROCEDURE — 25010000002 KETOROLAC TROMETHAMINE PER 15 MG: Performed by: INTERNAL MEDICINE

## 2021-08-11 PROCEDURE — 83605 ASSAY OF LACTIC ACID: CPT | Performed by: INTERNAL MEDICINE

## 2021-08-11 PROCEDURE — 85652 RBC SED RATE AUTOMATED: CPT | Performed by: INTERNAL MEDICINE

## 2021-08-11 PROCEDURE — 94799 UNLISTED PULMONARY SVC/PX: CPT

## 2021-08-11 PROCEDURE — 84100 ASSAY OF PHOSPHORUS: CPT | Performed by: INTERNAL MEDICINE

## 2021-08-11 PROCEDURE — 85025 COMPLETE CBC W/AUTO DIFF WBC: CPT | Performed by: PHYSICIAN ASSISTANT

## 2021-08-11 PROCEDURE — 99233 SBSQ HOSP IP/OBS HIGH 50: CPT | Performed by: INTERNAL MEDICINE

## 2021-08-11 PROCEDURE — 82728 ASSAY OF FERRITIN: CPT | Performed by: INTERNAL MEDICINE

## 2021-08-11 PROCEDURE — 85379 FIBRIN DEGRADATION QUANT: CPT | Performed by: INTERNAL MEDICINE

## 2021-08-11 PROCEDURE — 25010000002 FUROSEMIDE PER 20 MG: Performed by: INTERNAL MEDICINE

## 2021-08-11 PROCEDURE — 86140 C-REACTIVE PROTEIN: CPT | Performed by: INTERNAL MEDICINE

## 2021-08-11 PROCEDURE — 25010000002 DEXAMETHASONE PER 1 MG: Performed by: INTERNAL MEDICINE

## 2021-08-11 RX ADMIN — ENOXAPARIN SODIUM 40 MG: 40 INJECTION SUBCUTANEOUS at 17:20

## 2021-08-11 RX ADMIN — BUDESONIDE 0.5 MG: 0.5 INHALANT ORAL at 20:52

## 2021-08-11 RX ADMIN — FAMOTIDINE 20 MG: 20 TABLET ORAL at 06:31

## 2021-08-11 RX ADMIN — ENOXAPARIN SODIUM 40 MG: 40 INJECTION SUBCUTANEOUS at 06:27

## 2021-08-11 RX ADMIN — POTASSIUM CHLORIDE 40 MEQ: 10 CAPSULE, COATED, EXTENDED RELEASE ORAL at 08:40

## 2021-08-11 RX ADMIN — BUDESONIDE 0.5 MG: 0.5 INHALANT ORAL at 08:55

## 2021-08-11 RX ADMIN — MONTELUKAST SODIUM 10 MG: 10 TABLET, FILM COATED ORAL at 06:27

## 2021-08-11 RX ADMIN — BARICITINIB 4 MG: 2 TABLET, FILM COATED ORAL at 14:45

## 2021-08-11 RX ADMIN — HYDROCODONE POLISTIREX AND CHLORPHENIRAMINE POLISTIREX 5 ML: 10; 8 SUSPENSION, EXTENDED RELEASE ORAL at 21:13

## 2021-08-11 RX ADMIN — FAMOTIDINE 20 MG: 20 TABLET ORAL at 17:20

## 2021-08-11 RX ADMIN — DEXAMETHASONE SODIUM PHOSPHATE 10 MG: 10 INJECTION INTRAMUSCULAR; INTRAVENOUS at 08:39

## 2021-08-11 RX ADMIN — FUROSEMIDE 40 MG: 10 INJECTION, SOLUTION INTRAMUSCULAR; INTRAVENOUS at 08:39

## 2021-08-11 RX ADMIN — VENLAFAXINE HYDROCHLORIDE 225 MG: 150 CAPSULE, EXTENDED RELEASE ORAL at 08:39

## 2021-08-11 RX ADMIN — ATORVASTATIN CALCIUM 20 MG: 20 TABLET, FILM COATED ORAL at 21:13

## 2021-08-11 RX ADMIN — ARFORMOTEROL TARTRATE 15 MCG: 15 SOLUTION RESPIRATORY (INHALATION) at 08:55

## 2021-08-11 RX ADMIN — DEXAMETHASONE SODIUM PHOSPHATE 10 MG: 10 INJECTION INTRAMUSCULAR; INTRAVENOUS at 21:14

## 2021-08-11 RX ADMIN — KETOROLAC TROMETHAMINE 30 MG: 30 INJECTION, SOLUTION INTRAMUSCULAR; INTRAVENOUS at 21:47

## 2021-08-11 RX ADMIN — FUROSEMIDE 40 MG: 10 INJECTION, SOLUTION INTRAMUSCULAR; INTRAVENOUS at 17:20

## 2021-08-11 RX ADMIN — HYDROCODONE POLISTIREX AND CHLORPHENIRAMINE POLISTIREX 5 ML: 10; 8 SUSPENSION, EXTENDED RELEASE ORAL at 08:39

## 2021-08-11 RX ADMIN — ARFORMOTEROL TARTRATE 15 MCG: 15 SOLUTION RESPIRATORY (INHALATION) at 20:52

## 2021-08-11 NOTE — PROGRESS NOTES
[facility]   Hospitalist Progress Note  Date: 2021  Patient Name: Dang Hunt  : 1965  MRN: [id number]  Date of admission: 2021      Subjective   Subjective     Chief Complaint: Shortness of air    Summary:   Dang Hunt is a 56 y.o. female resents with complaints of shortness of breath.  She states she has been sick for approximately 10 days has had a temperature of 103.  Patient has had nausea lower abdominal pain dyspnea and diarrhea.  She tested positive for Covid on .  She presents to the ED today and was found to be hypoxic with O2 sats between 89 to 96%.  Patient has not been vaccinated for COVID-19.  Patient uses CPAP at night at home not aware of the pressure    Interval Followup:   Patient down to 8 L high flow nasal cannula to keep sats more than 90%.  No home oxygen use.  At night uses 2 L of oxygen bled into CPAP  Other vital signs stable.  Continues to have shortness of air.  Better than yesterday  Does have cough with dark mucus.  Occasional blood-tinged mucus.  Does have loss of taste and smell but is improving.  Appetite is down.  No more diarrhea or abdominal cramps  Sore throat better    Review of Systems   All systems were reviewed and negative except for: Summary and interval follow-up    Objective   Objective     Vitals:   Temp:  [97.5 °F (36.4 °C)-98.2 °F (36.8 °C)] 98.2 °F (36.8 °C)  Heart Rate:  [64-82] 73  Resp:  [18-20] 18  BP: (123-145)/(70-89) 132/82  Flow (L/min):  [7-11] 7  Physical Exam    Constitutional: Awake, alert, no acute distress   Eyes: Pupils equal, sclerae anicteric, no conjunctival injection   HENT: NCAT, mucous membranes moist   Neck: Supple, no thyromegaly, no lymphadenopathy, trachea midline   Respiratory: Rhonchi and decreased breath sounds bilaterally, nonlabored respirations    Cardiovascular: RRR, no murmurs, rubs, or gallops, palpable pedal pulses bilaterally   Gastrointestinal: Positive bowel sounds, soft, nontender,  nondistended   Musculoskeletal: No bilateral ankle edema, no clubbing or cyanosis to extremities   Psychiatric: Appropriate affect, cooperative   Neurologic: Oriented x 3, strength symmetric in all extremities, Cranial Nerves grossly intact to confrontation, speech clear   Skin: No rashes     Result Review    Result Review:  I have personally reviewed the results from the time of this admission to 8/11/2021 19:02 EDT and agree with these findings:  [x]  Laboratory  [x]  Microbiology  [x]  Radiology  [x]  EKG/Telemetry   []  Cardiology/Vascular   []  Pathology  [x]  Old records  [x]  Other: Medications  Assessment/Plan   Assessment / Plan     Assessment:  Acute hypoxic respiratory failure.  Patient does not use continuous oxygen at home.  COVID-19 pneumonia causing above.  Patient not vaccinated.  Likely community-acquired pneumonia.  NIDDM.  Hemoglobin A1c of 5.8%  Morbid obesity BMI 40.6.  Obstructive sleep apnea on home CPAP with nocturnal oxygen.  History of COPD.  Hypertension.  Hyperlipidemia.     Plan:  Chest x-ray worsening infiltrate  No Actemra available due to shortage  Appreciate pulmonary input.  Started on baricitinib day 2/14  Continue supplemental oxygen keep sats more than 92%.  Prone ventilation encouraged.  Finished 5 days of IV remdesivir August 10  Continue IV Decadron.  Increased to 10 mg twice a day  Rocephin and Zithromax.  Nebulizer treatment with DuoNeb, Pulmicort and Brovana neb.  Bronchodilator and bronchopulmonary hygiene protocol.  Tussionex.  Sliding scale insulin.  IV Lasix .  Chloraseptic and Cepacol for sore throat  As needed Toradol for the headache.  Resumed home medication including Effexor.  Await culture data.  MRSA DNA PCR, Legionella and strep pneumo antigen negative.  Blood culture negative a.  Inflammatory markers improving continue to trend.  D-dimer up , ESR negative.  CRP and ferritin trending down  Procalcitonin  negative.  Lactic acidosis resolved  Pepcid.  DVT  prophylaxis with Lovenox.  Dose adjusted per pharmacy for weight.    Discussed plan with RN and .      DVT prophylaxis:  Medical DVT prophylaxis orders are present.    CODE STATUS:   Level Of Support Discussed With: Patient  Code Status: CPR  Medical Interventions (Level of Support Prior to Arrest): Full      Part of this note may be an electronic transcription/translation of spoken language to printed text using the Dragon Dictation System.       Electronically signed by Markell Triana MD, 08/11/21, 7:04 PM EDT.        .

## 2021-08-11 NOTE — PLAN OF CARE
Goal Outcome Evaluation:           Progress: no change  Outcome Summary: Patient slept well last night. No new concerns voiced at this time. No significant changes.

## 2021-08-11 NOTE — PROGRESS NOTES
Pulmonary / Critical Care Progress Note      Patient Name: Dang Hunt  : 1965  MRN: [id number]  Attending:  Markell Triana MD  Date of admission: 2021    Subjective   Subjective   Follow-up for COVID-19, hypoxemia    Over past 24 hours:  Does feel little bit better today  Receiving a breathing treatment this morning  Down to 7 L of oxygen  Dyspnea improved  Dry hacking cough improved  No wheezing, headaches, chest pain or hemoptysis  No nausea, fevers or chills      Review of Systems  General: Fatigue and weakness, otherwise denied complaints  Cardiovascular:  Denied complaints  Respiratory: Dyspnea, cough, otherwise denied complaints  Gastrointestinal: Denied complaints        Objective   Objective     Vitals:   Temp:  [97.3 °F (36.3 °C)-98 °F (36.7 °C)] 97.8 °F (36.6 °C)  Heart Rate:  [64-82] 73  Resp:  [18-20] 18  BP: (123-145)/(70-89) 135/89  Flow (L/min):  [7-11] 7    Physical Exam   Vital Signs Reviewed   WDWN, Alert, NAD.    HEENT:  PERRL, EOMI.  OP, nares clear, no sinus tenderness  Neck:  Supple, no JVD, no thyromegaly  Chest:  good aeration,  decreasing crackles and rhonchi bilaterally, tympanic to percussion bilaterally, no work of breathing noted  CV: RRR, no MGR, pulses 2+, equal.  Abd:  Soft, NT, ND, + BS, no HSM, obese  EXT:  no clubbing, no cyanosis,  decreasing BLE edema  Neuro:  A&Ox3, CN grossly intact, no focal deficits.  Skin: No rashes or lesions noted      Result Review    Result Review:  I have personally reviewed the results from the time of this admission to 2021 13:27 EDT and agree with these findings:  [x]  Laboratory  [x]  Microbiology  [x]  Radiology  [x]  EKG/Telemetry   []  Cardiology/Vascular   []  Pathology  []  Old records  []  Other:      Assessment/Plan   Assessment / Plan     Active Hospital Problems:  Active Hospital Problems    Diagnosis    • COVID-19 virus infection    • Acute respiratory failure with hypoxia (CMS/HCC)          Impression:  Acute  hypoxemic respiratory failure  COVID-19 pneumonia  Acute exacerbation of COPD  Therapeutic drug monitoring of remdesivir  Obstructive sleep apnea on nighttime CPAP with 2 L of oxygen bled in  Morbid obesity with BMI 40.69     Plan:  Complete 5 days of remdesivir.  Check daily CMP for therapeutic drug monitoring  On day 2/14 of barcitinib.    Continue Decadron 10 mg IV twice daily  Continue nebulizers and bronchopulmonary hygiene  Continue Lasix 40 mg IV twice daily.  Trend renal function and electrolytes   Encourage prone positioning.  Otherwise, get patient out of bed and into chair  Encourage incentive spirometer use  Continue NIPPV nightly with naps on current settings  Wean O2 to keep SPO2 greater than 90%  Trend inflammatory markers  DVT prophylaxis:  Medical DVT prophylaxis orders are present.    CODE STATUS:   Level Of Support Discussed With: Patient  Code Status: CPR  Medical Interventions (Level of Support Prior to Arrest): Full      Labs, microbiology, notes and medications personally reviewed.  Discussed with primary and bedside nurse    Electronically signed by Skyler Gonzalez MD, 08/11/21, 1:28 PM EDT.

## 2021-08-12 LAB
ALBUMIN SERPL-MCNC: 3.3 G/DL (ref 3.5–5.2)
ALBUMIN/GLOB SERPL: 1 G/DL
ALP SERPL-CCNC: 117 U/L (ref 39–117)
ALT SERPL W P-5'-P-CCNC: 16 U/L (ref 1–33)
ANION GAP SERPL CALCULATED.3IONS-SCNC: 9.2 MMOL/L (ref 5–15)
AST SERPL-CCNC: 13 U/L (ref 1–32)
BILIRUB SERPL-MCNC: 0.4 MG/DL (ref 0–1.2)
BUN SERPL-MCNC: 35 MG/DL (ref 6–20)
BUN/CREAT SERPL: 34 (ref 7–25)
CALCIUM SPEC-SCNC: 8.6 MG/DL (ref 8.6–10.5)
CHLORIDE SERPL-SCNC: 99 MMOL/L (ref 98–107)
CO2 SERPL-SCNC: 29.8 MMOL/L (ref 22–29)
CREAT SERPL-MCNC: 1.03 MG/DL (ref 0.57–1)
DEPRECATED RDW RBC AUTO: 49.7 FL (ref 37–54)
ERYTHROCYTE [DISTWIDTH] IN BLOOD BY AUTOMATED COUNT: 15.4 % (ref 12.3–15.4)
GFR SERPL CREATININE-BSD FRML MDRD: 56 ML/MIN/1.73
GLOBULIN UR ELPH-MCNC: 3.4 GM/DL
GLUCOSE SERPL-MCNC: 161 MG/DL (ref 65–99)
HCT VFR BLD AUTO: 45 % (ref 34–46.6)
HGB BLD-MCNC: 14.1 G/DL (ref 12–15.9)
LYMPHOCYTES # BLD MANUAL: 1.08 10*3/MM3 (ref 0.7–3.1)
LYMPHOCYTES NFR BLD MANUAL: 11 % (ref 19.6–45.3)
LYMPHOCYTES NFR BLD MANUAL: 3 % (ref 5–12)
MCH RBC QN AUTO: 27.8 PG (ref 26.6–33)
MCHC RBC AUTO-ENTMCNC: 31.3 G/DL (ref 31.5–35.7)
MCV RBC AUTO: 88.6 FL (ref 79–97)
MONOCYTES # BLD AUTO: 0.23 10*3/MM3 (ref 0.1–0.9)
NEUTROPHILS # BLD AUTO: 6.39 10*3/MM3 (ref 1.7–7)
NEUTROPHILS NFR BLD MANUAL: 83 % (ref 42.7–76)
PLATELET # BLD AUTO: 311 10*3/MM3 (ref 140–450)
PMV BLD AUTO: 10.5 FL (ref 6–12)
POTASSIUM SERPL-SCNC: 4.1 MMOL/L (ref 3.5–5.2)
PROT SERPL-MCNC: 6.7 G/DL (ref 6–8.5)
RBC # BLD AUTO: 5.08 10*6/MM3 (ref 3.77–5.28)
RBC MORPH BLD: NORMAL
SMALL PLATELETS BLD QL SMEAR: ADEQUATE
SODIUM SERPL-SCNC: 138 MMOL/L (ref 136–145)
VARIANT LYMPHS NFR BLD MANUAL: 3 % (ref 0–5)
WBC # BLD AUTO: 7.7 10*3/MM3 (ref 3.4–10.8)
WBC MORPH BLD: NORMAL

## 2021-08-12 PROCEDURE — 80053 COMPREHEN METABOLIC PANEL: CPT | Performed by: INTERNAL MEDICINE

## 2021-08-12 PROCEDURE — 99233 SBSQ HOSP IP/OBS HIGH 50: CPT | Performed by: INTERNAL MEDICINE

## 2021-08-12 PROCEDURE — 94799 UNLISTED PULMONARY SVC/PX: CPT

## 2021-08-12 PROCEDURE — 94660 CPAP INITIATION&MGMT: CPT

## 2021-08-12 PROCEDURE — 85007 BL SMEAR W/DIFF WBC COUNT: CPT | Performed by: INTERNAL MEDICINE

## 2021-08-12 PROCEDURE — 25010000002 FUROSEMIDE PER 20 MG: Performed by: INTERNAL MEDICINE

## 2021-08-12 PROCEDURE — 25010000002 ENOXAPARIN PER 10 MG: Performed by: INTERNAL MEDICINE

## 2021-08-12 PROCEDURE — 85025 COMPLETE CBC W/AUTO DIFF WBC: CPT | Performed by: INTERNAL MEDICINE

## 2021-08-12 PROCEDURE — 25010000002 DEXAMETHASONE PER 1 MG: Performed by: INTERNAL MEDICINE

## 2021-08-12 PROCEDURE — 99232 SBSQ HOSP IP/OBS MODERATE 35: CPT | Performed by: INTERNAL MEDICINE

## 2021-08-12 RX ORDER — FUROSEMIDE 10 MG/ML
20 INJECTION INTRAMUSCULAR; INTRAVENOUS
Status: DISCONTINUED | OUTPATIENT
Start: 2021-08-12 | End: 2021-08-13 | Stop reason: HOSPADM

## 2021-08-12 RX ORDER — KETOROLAC TROMETHAMINE 30 MG/ML
30 INJECTION, SOLUTION INTRAMUSCULAR; INTRAVENOUS EVERY 6 HOURS PRN
Status: DISCONTINUED | OUTPATIENT
Start: 2021-08-12 | End: 2021-08-13 | Stop reason: HOSPADM

## 2021-08-12 RX ADMIN — BUDESONIDE 0.5 MG: 0.5 INHALANT ORAL at 11:13

## 2021-08-12 RX ADMIN — HYDROCODONE POLISTIREX AND CHLORPHENIRAMINE POLISTIREX 5 ML: 10; 8 SUSPENSION, EXTENDED RELEASE ORAL at 20:40

## 2021-08-12 RX ADMIN — FAMOTIDINE 20 MG: 20 TABLET ORAL at 06:40

## 2021-08-12 RX ADMIN — ARFORMOTEROL TARTRATE 15 MCG: 15 SOLUTION RESPIRATORY (INHALATION) at 11:13

## 2021-08-12 RX ADMIN — ATORVASTATIN CALCIUM 20 MG: 20 TABLET, FILM COATED ORAL at 20:40

## 2021-08-12 RX ADMIN — FAMOTIDINE 20 MG: 20 TABLET ORAL at 17:07

## 2021-08-12 RX ADMIN — ENOXAPARIN SODIUM 40 MG: 40 INJECTION SUBCUTANEOUS at 05:34

## 2021-08-12 RX ADMIN — HYDROCODONE POLISTIREX AND CHLORPHENIRAMINE POLISTIREX 5 ML: 10; 8 SUSPENSION, EXTENDED RELEASE ORAL at 09:45

## 2021-08-12 RX ADMIN — ENOXAPARIN SODIUM 40 MG: 40 INJECTION SUBCUTANEOUS at 17:07

## 2021-08-12 RX ADMIN — BARICITINIB 4 MG: 2 TABLET, FILM COATED ORAL at 17:06

## 2021-08-12 RX ADMIN — POTASSIUM CHLORIDE 40 MEQ: 10 CAPSULE, COATED, EXTENDED RELEASE ORAL at 09:44

## 2021-08-12 RX ADMIN — BUDESONIDE 0.5 MG: 0.5 INHALANT ORAL at 20:13

## 2021-08-12 RX ADMIN — DEXAMETHASONE SODIUM PHOSPHATE 10 MG: 10 INJECTION INTRAMUSCULAR; INTRAVENOUS at 09:44

## 2021-08-12 RX ADMIN — SODIUM CHLORIDE, PRESERVATIVE FREE 10 ML: 5 INJECTION INTRAVENOUS at 17:07

## 2021-08-12 RX ADMIN — VENLAFAXINE HYDROCHLORIDE 225 MG: 150 CAPSULE, EXTENDED RELEASE ORAL at 09:44

## 2021-08-12 RX ADMIN — FUROSEMIDE 20 MG: 10 INJECTION, SOLUTION INTRAVENOUS at 17:07

## 2021-08-12 RX ADMIN — DEXAMETHASONE SODIUM PHOSPHATE 10 MG: 10 INJECTION INTRAMUSCULAR; INTRAVENOUS at 20:40

## 2021-08-12 RX ADMIN — FUROSEMIDE 40 MG: 10 INJECTION, SOLUTION INTRAMUSCULAR; INTRAVENOUS at 09:44

## 2021-08-12 RX ADMIN — MONTELUKAST SODIUM 10 MG: 10 TABLET, FILM COATED ORAL at 06:40

## 2021-08-12 RX ADMIN — SODIUM CHLORIDE, PRESERVATIVE FREE 10 ML: 5 INJECTION INTRAVENOUS at 09:44

## 2021-08-12 RX ADMIN — ARFORMOTEROL TARTRATE 15 MCG: 15 SOLUTION RESPIRATORY (INHALATION) at 20:13

## 2021-08-12 NOTE — CONSULTS
"Nutrition Services    Patient Name: Dang Hunt  YOB: 1965  MRN: [id number]  Admission date: 8/5/2021      CLINICAL NUTRITION ASSESSMENT      Reason for Assessment  LOS     H&P:    Past Medical History:   Diagnosis Date   • Arthritis    • Asthma    • CHF (congestive heart failure) (CMS/[facility])    • Colitis    • COPD (chronic obstructive pulmonary disease) (CMS/[facility])    • Depression    • Dysphagia    • Edema    • Fatigue    • GERD (gastroesophageal reflux disease)    • History of acute pancreatitis    • History of histoplasmosis    • HTN (hypertension)    • Hyperlipidemia    • Light headed    • Migraines    • Multiple joint pain    • On home oxygen therapy     2 L AT NIGHT   • MANI (obstructive sleep apnea)     WEARS CPAP   • RLS (restless legs syndrome)         Current Problems:   Active Hospital Problems    Diagnosis    • COVID-19 virus infection    • Acute respiratory failure with hypoxia (CMS/[facility])         Nutrition/Diet History         Narrative     Patient reviewed related to LOS x 7 days. Patient has variable po intake, %. Not receiving oral nutrition supplement at this time but would benefit from one due to increased energy requirements related to COVID-19 infection. Attempt to reach patient by phone was unsuccessful, but will order oral nutrition supplement to promote calorie and protein intake and help meet estimated needs. RD will continue to monitor and follow per protocol.     Anthropometrics        Current Height, Weight Height: 165.1 cm (65\")  Weight: 111 kg (244 lb 7.8 oz)   Current BMI Body mass index is 40.69 kg/m².       Weight Hx  Wt Readings from Last 30 Encounters:   08/06/21 0503 111 kg (244 lb 7.8 oz)   08/06/21 0339 111 kg (244 lb 7.8 oz)   08/05/21 2226 110 kg (243 lb 6.2 oz)   10/08/20 0858 116 kg (256 lb)   09/13/19 0923 116 kg (255 lb 5 oz)   07/01/19 0944 117 kg (257 lb)   08/22/18 1431 113 kg (249 lb 3 oz)   09/07/17 1143 104 kg (230 lb)   06/19/17 1007 112 kg (248 lb) "   05/18/17 1030 119 kg (263 lb)   05/15/17 1526 127 kg (279 lb 3.2 oz)   05/04/17 0828 122 kg (268 lb 8 oz)   01/13/17 0718 119 kg (263 lb)              Estimated/Assessed Needs       Energy Requirements    EST Needs (kcal/day) 2067-6436 kcal        Protein Requirements    EST Daily Needs (g/day) 105-175 g protein       Fluid Requirements     Estimated Needs (mL/day) 0809-9600 ml fluid      Labs/Medications         Pertinent Labs Reviewed.   Results from last 7 days   Lab Units 08/12/21  0618 08/11/21  0623 08/10/21  0748   SODIUM mmol/L 138 137 137   POTASSIUM mmol/L 4.1 4.1 4.0   CHLORIDE mmol/L 99 100 100   CO2 mmol/L 29.8* 29.4* 27.0   BUN mg/dL 35* 29* 20   CREATININE mg/dL 1.03* 0.91 0.88   CALCIUM mg/dL 8.6 8.4* 8.5*   BILIRUBIN mg/dL 0.4 0.4 0.3   ALK PHOS U/L 117 120* 124*   ALT (SGPT) U/L 16 16 17   AST (SGOT) U/L 13 15 20   GLUCOSE mg/dL 161* 146* 127*     Results from last 7 days   Lab Units 08/12/21  0618 08/11/21  0624 08/11/21  0623 08/10/21  0748 08/10/21  0748 08/09/21  0810 08/09/21  0810   MAGNESIUM mg/dL  --   --  2.0  --  1.9  --  1.9   PHOSPHORUS mg/dL  --   --  3.7   < > 3.0   < > 2.8   HEMOGLOBIN g/dL 14.1   < >  --   --  12.9   < > 13.1   HEMATOCRIT % 45.0   < >  --   --  40.4   < > 40.7    < > = values in this interval not displayed.     COVID19   Date Value Ref Range Status   08/06/2021 Detected (C) Not Detected - Ref. Range Final     Lab Results   Component Value Date    HGBA1C 5.88 (H) 08/06/2021         Pertinent Medications Reviewed.     Current Nutrition Orders & Evaluation of Intake       Oral Nutrition     Current PO Diet Diet Regular; Cardiac, Consistent Carbohydrate   Supplement Boost Glucose Control TID      Nutrition Diagnosis         Nutrition Dx Problem 1 Increased nutrient needs related to increased nutrient needs due to catabolic disease as evidenced by COVID-19 infection.       Nutrition Intervention         Boost Glucose Control TID w/ meals (+750 kcal, 42 g protein)      Monitor/Evaluation        Monitor monitor/eval: Per protocol, I&O, PO intake, Supplement intake, Pertinent labs, Weight, Symptoms, POC/GOC       Electronically signed by:  Kevin Connelly RD  08/12/21 12:21 EDT

## 2021-08-12 NOTE — PLAN OF CARE
Goal Outcome Evaluation:           Progress: improving  Outcome Summary: pt resting in bed, c-pap in place, c/o headache, toradol given.  pt on 6L high flow nasal canula while awake.

## 2021-08-12 NOTE — PLAN OF CARE
Goal Outcome Evaluation:              Outcome Summary: 3-5liters o2 via nc this shift. no c/o headache this shift. no new issues/needs noted.

## 2021-08-12 NOTE — PROGRESS NOTES
Pulmonary / Critical Care Progress Note      Patient Name: Dang Hunt  : 1965  MRN: [id number]  Attending:  Markell Triana MD  Date of admission: 2021    Subjective   Subjective   Follow-up for COVID-19, hypoxemia    Over past 24 hours:  Down to 6 L of oxygen  Feels slightly better  Cough decreased with no sputum or hemoptysis  Dyspnea improved  No wheezing, headaches, chest pain or hemoptysis  No nausea, fevers or chills      Review of Systems  General: Fatigue and weakness, otherwise denied complaints  Cardiovascular:  Denied complaints  Respiratory: Dyspnea, cough, otherwise denied complaints  Gastrointestinal: Denied complaints        Objective   Objective     Vitals:   Temp:  [97.4 °F (36.3 °C)-98.2 °F (36.8 °C)] 97.7 °F (36.5 °C)  Heart Rate:  [63-81] 73  Resp:  [16-18] 18  BP: (130-150)/(82-92) 133/85  Flow (L/min):  [3-7] 3    Physical Exam   Vital Signs Reviewed   WDWN, Alert, NAD.    HEENT:  PERRL, EOMI.  OP, nares clear, no sinus tenderness  Neck:  Supple, no JVD, no thyromegaly  Chest:  good aeration,  decreasing crackles and rhonchi bilaterally, tympanic to percussion bilaterally, no work of breathing noted  CV: RRR, no MGR, pulses 2+, equal.  Abd:  Soft, NT, ND, + BS, no HSM, obese  EXT:  no clubbing, no cyanosis,  decreasing BLE edema  Neuro:  A&Ox3, CN grossly intact, no focal deficits.  Skin: No rashes or lesions noted      Result Review    Result Review:  I have personally reviewed the results from the time of this admission to 2021 13:32 EDT and agree with these findings:  [x]  Laboratory  []  Microbiology  []  Radiology  []  EKG/Telemetry   []  Cardiology/Vascular   []  Pathology  []  Old records  []  Other:      Assessment/Plan   Assessment / Plan     Active Hospital Problems:  Active Hospital Problems    Diagnosis    • COVID-19 virus infection    • Acute respiratory failure with hypoxia (CMS/HCC)          Impression:  Acute hypoxemic respiratory failure  COVID-19  pneumonia  Acute exacerbation of COPD  Therapeutic drug monitoring of remdesivir  Obstructive sleep apnea on nighttime CPAP with 2 L of oxygen bled in  Morbid obesity with BMI 40.69     Plan:  Complete 5 days of remdesivir.  Check daily CMP for therapeutic drug monitoring  On day 3/14 of barcitinib.    Continue Decadron 10 mg IV twice daily  Continue nebulizers and bronchopulmonary hygiene  Continue Lasix 40 mg IV twice daily.  Trend renal function and electrolytes   Encourage prone positioning.  Otherwise, get patient out of bed and into chair  Encourage incentive spirometer use  Continue NIPPV nightly with naps on current settings  Wean O2 to keep SPO2 greater than 90%  Trend inflammatory markers    DVT prophylaxis:  Medical DVT prophylaxis orders are present.    CODE STATUS:   Level Of Support Discussed With: Patient  Code Status: CPR  Medical Interventions (Level of Support Prior to Arrest): Full      Labs, notes and medications personally reviewed.  Discussed with primary and bedside nurse    Electronically signed by Skyler Gonzalez MD, 08/12/21, 1:33 PM EDT.

## 2021-08-13 ENCOUNTER — READMISSION MANAGEMENT (OUTPATIENT)
Dept: CALL CENTER | Facility: HOSPITAL | Age: 56
End: 2021-08-13

## 2021-08-13 VITALS
BODY MASS INDEX: 40.73 KG/M2 | WEIGHT: 244.49 LBS | SYSTOLIC BLOOD PRESSURE: 110 MMHG | RESPIRATION RATE: 18 BRPM | HEIGHT: 65 IN | TEMPERATURE: 97.5 F | DIASTOLIC BLOOD PRESSURE: 74 MMHG | OXYGEN SATURATION: 97 % | HEART RATE: 74 BPM

## 2021-08-13 LAB
ALBUMIN SERPL-MCNC: 3.3 G/DL (ref 3.5–5.2)
ALBUMIN/GLOB SERPL: 1 G/DL
ALP SERPL-CCNC: 113 U/L (ref 39–117)
ALT SERPL W P-5'-P-CCNC: 15 U/L (ref 1–33)
ANION GAP SERPL CALCULATED.3IONS-SCNC: 10.4 MMOL/L (ref 5–15)
AST SERPL-CCNC: 11 U/L (ref 1–32)
BILIRUB SERPL-MCNC: 0.4 MG/DL (ref 0–1.2)
BUN SERPL-MCNC: 39 MG/DL (ref 6–20)
BUN/CREAT SERPL: 38.6 (ref 7–25)
CALCIUM SPEC-SCNC: 8.7 MG/DL (ref 8.6–10.5)
CHLORIDE SERPL-SCNC: 100 MMOL/L (ref 98–107)
CO2 SERPL-SCNC: 26.6 MMOL/L (ref 22–29)
CREAT SERPL-MCNC: 1.01 MG/DL (ref 0.57–1)
D DIMER PPP FEU-MCNC: 1.09 MG/L (FEU) (ref 0–0.59)
D-LACTATE SERPL-SCNC: 1.8 MMOL/L (ref 0.5–2)
DEPRECATED RDW RBC AUTO: 47.7 FL (ref 37–54)
ERYTHROCYTE [DISTWIDTH] IN BLOOD BY AUTOMATED COUNT: 14.9 % (ref 12.3–15.4)
GFR SERPL CREATININE-BSD FRML MDRD: 57 ML/MIN/1.73
GLOBULIN UR ELPH-MCNC: 3.3 GM/DL
GLUCOSE SERPL-MCNC: 164 MG/DL (ref 65–99)
HCT VFR BLD AUTO: 44.7 % (ref 34–46.6)
HGB BLD-MCNC: 14.3 G/DL (ref 12–15.9)
LYMPHOCYTES # BLD MANUAL: 0.88 10*3/MM3 (ref 0.7–3.1)
LYMPHOCYTES NFR BLD MANUAL: 3 % (ref 19.6–45.3)
LYMPHOCYTES NFR BLD MANUAL: 5 % (ref 5–12)
MCH RBC QN AUTO: 27.7 PG (ref 26.6–33)
MCHC RBC AUTO-ENTMCNC: 32 G/DL (ref 31.5–35.7)
MCV RBC AUTO: 86.6 FL (ref 79–97)
MONOCYTES # BLD AUTO: 0.49 10*3/MM3 (ref 0.1–0.9)
NEUTROPHILS # BLD AUTO: 8.42 10*3/MM3 (ref 1.7–7)
NEUTROPHILS NFR BLD MANUAL: 85 % (ref 42.7–76)
NEUTS BAND NFR BLD MANUAL: 1 % (ref 0–5)
PLATELET # BLD AUTO: 317 10*3/MM3 (ref 140–450)
PMV BLD AUTO: 9.9 FL (ref 6–12)
POTASSIUM SERPL-SCNC: 4.5 MMOL/L (ref 3.5–5.2)
PROCALCITONIN SERPL-MCNC: 0.03 NG/ML (ref 0–0.25)
PROT SERPL-MCNC: 6.6 G/DL (ref 6–8.5)
RBC # BLD AUTO: 5.16 10*6/MM3 (ref 3.77–5.28)
RBC MORPH BLD: NORMAL
SMALL PLATELETS BLD QL SMEAR: ADEQUATE
SODIUM SERPL-SCNC: 137 MMOL/L (ref 136–145)
VARIANT LYMPHS NFR BLD MANUAL: 6 % (ref 0–5)
WBC # BLD AUTO: 9.79 10*3/MM3 (ref 3.4–10.8)
WBC MORPH BLD: NORMAL

## 2021-08-13 PROCEDURE — 85025 COMPLETE CBC W/AUTO DIFF WBC: CPT | Performed by: INTERNAL MEDICINE

## 2021-08-13 PROCEDURE — 99232 SBSQ HOSP IP/OBS MODERATE 35: CPT | Performed by: INTERNAL MEDICINE

## 2021-08-13 PROCEDURE — 94799 UNLISTED PULMONARY SVC/PX: CPT

## 2021-08-13 PROCEDURE — 25010000002 DEXAMETHASONE PER 1 MG: Performed by: INTERNAL MEDICINE

## 2021-08-13 PROCEDURE — 99233 SBSQ HOSP IP/OBS HIGH 50: CPT | Performed by: HOSPITALIST

## 2021-08-13 PROCEDURE — 85379 FIBRIN DEGRADATION QUANT: CPT | Performed by: INTERNAL MEDICINE

## 2021-08-13 PROCEDURE — 99239 HOSP IP/OBS DSCHRG MGMT >30: CPT | Performed by: HOSPITALIST

## 2021-08-13 PROCEDURE — 94640 AIRWAY INHALATION TREATMENT: CPT

## 2021-08-13 PROCEDURE — 84145 PROCALCITONIN (PCT): CPT | Performed by: INTERNAL MEDICINE

## 2021-08-13 PROCEDURE — 80053 COMPREHEN METABOLIC PANEL: CPT | Performed by: INTERNAL MEDICINE

## 2021-08-13 PROCEDURE — 83605 ASSAY OF LACTIC ACID: CPT | Performed by: INTERNAL MEDICINE

## 2021-08-13 PROCEDURE — 85007 BL SMEAR W/DIFF WBC COUNT: CPT | Performed by: INTERNAL MEDICINE

## 2021-08-13 PROCEDURE — 25010000002 FUROSEMIDE PER 20 MG: Performed by: INTERNAL MEDICINE

## 2021-08-13 RX ORDER — PREDNISONE 20 MG/1
20 TABLET ORAL DAILY
Qty: 30 TABLET | Refills: 0 | Status: SHIPPED | OUTPATIENT
Start: 2021-08-13 | End: 2022-01-11

## 2021-08-13 RX ORDER — MORPHINE SULFATE 2 MG/ML
1 INJECTION, SOLUTION INTRAMUSCULAR; INTRAVENOUS EVERY 4 HOURS PRN
Status: DISCONTINUED | OUTPATIENT
Start: 2021-08-13 | End: 2021-08-13 | Stop reason: HOSPADM

## 2021-08-13 RX ORDER — BUDESONIDE AND FORMOTEROL FUMARATE DIHYDRATE 160; 4.5 UG/1; UG/1
2 AEROSOL RESPIRATORY (INHALATION)
Qty: 10.2 G | Refills: 0 | Status: SHIPPED | OUTPATIENT
Start: 2021-08-13 | End: 2022-01-11 | Stop reason: ALTCHOICE

## 2021-08-13 RX ORDER — IPRATROPIUM BROMIDE AND ALBUTEROL SULFATE 2.5; .5 MG/3ML; MG/3ML
3 SOLUTION RESPIRATORY (INHALATION) EVERY 4 HOURS PRN
Qty: 360 ML | Refills: 0 | Status: SHIPPED | OUTPATIENT
Start: 2021-08-13 | End: 2022-03-21

## 2021-08-13 RX ORDER — FUROSEMIDE 20 MG/1
20 TABLET ORAL 2 TIMES DAILY
Qty: 10 TABLET | Refills: 0 | Status: SHIPPED | OUTPATIENT
Start: 2021-08-13 | End: 2022-01-11 | Stop reason: SDUPTHER

## 2021-08-13 RX ORDER — FAMOTIDINE 20 MG/1
20 TABLET, FILM COATED ORAL
Qty: 30 TABLET | Refills: 1 | Status: SHIPPED | OUTPATIENT
Start: 2021-08-13 | End: 2022-01-11 | Stop reason: ALTCHOICE

## 2021-08-13 RX ORDER — BUDESONIDE AND FORMOTEROL FUMARATE DIHYDRATE 160; 4.5 UG/1; UG/1
2 AEROSOL RESPIRATORY (INHALATION)
Status: DISCONTINUED | OUTPATIENT
Start: 2021-08-13 | End: 2021-08-13 | Stop reason: HOSPADM

## 2021-08-13 RX ADMIN — FUROSEMIDE 20 MG: 10 INJECTION, SOLUTION INTRAVENOUS at 09:47

## 2021-08-13 RX ADMIN — BUDESONIDE AND FORMOTEROL FUMARATE DIHYDRATE 2 PUFF: 160; 4.5 AEROSOL RESPIRATORY (INHALATION) at 11:19

## 2021-08-13 RX ADMIN — DEXAMETHASONE SODIUM PHOSPHATE 10 MG: 10 INJECTION INTRAMUSCULAR; INTRAVENOUS at 09:47

## 2021-08-13 RX ADMIN — VENLAFAXINE HYDROCHLORIDE 225 MG: 150 CAPSULE, EXTENDED RELEASE ORAL at 08:09

## 2021-08-13 RX ADMIN — POTASSIUM CHLORIDE 40 MEQ: 10 CAPSULE, COATED, EXTENDED RELEASE ORAL at 09:47

## 2021-08-13 RX ADMIN — FAMOTIDINE 20 MG: 20 TABLET ORAL at 07:08

## 2021-08-13 RX ADMIN — MONTELUKAST SODIUM 10 MG: 10 TABLET, FILM COATED ORAL at 07:07

## 2021-08-13 RX ADMIN — HYDROCODONE POLISTIREX AND CHLORPHENIRAMINE POLISTIREX 5 ML: 10; 8 SUSPENSION, EXTENDED RELEASE ORAL at 09:47

## 2021-08-13 NOTE — PLAN OF CARE
"Goal Outcome Evaluation:  Plan of Care Reviewed With: patient        Progress: improving  Outcome Summary: 3 L NC maintained overnight. denies all complaints. up to bathroom independently. reports \"feeling better\" this evening. resting well at this time.  "

## 2021-08-13 NOTE — SIGNIFICANT NOTE
08/13/21 0915   Coping/Psychosocial   Observed Emotional State calm;cooperative   Verbalized Emotional State happiness   Trust Relationship/Rapport empathic listening provided   Involvement in Care at bedside;interacting with patient   Spiritual Care   Spiritual Care Visit Type initial   Spiritual Care Source  initiative   Receptivity to Spiritual Care visit welcomed   Spiritual Care Interventions supportive conversation provided   Response to Spiritual Care engaged in conversation;receptive of support;thanks expressed   Use of Spiritual Resources non-Evangelical use of spiritual care   Spiritual Care Follow-Up follow-up, none required as presently assessed

## 2021-08-13 NOTE — DISCHARGE SUMMARY
[facility]         HOSPITALIST  DISCHARGE SUMMARY    Patient Name: Dang Hunt  : 1965  MRN: [id number]    Date of Admission: 2021  Date of Discharge: 2021  Primary Care Physician: Dayna Sylvester APRN    Consults     Date and Time Order Name Status Description    8/10/2021 10:31 AM Inpatient Pulmonology Consult Completed     2021 12:16 AM Hospitalist (on-call MD unless specified) Completed           Active and Resolved Hospital Problems:  Active Hospital Problems    Diagnosis POA   • COVID-19 virus infection [U07.1] Unknown   • Acute respiratory failure with hypoxia (CMS/HCC) [J96.01] Yes      Resolved Hospital Problems   No resolved problems to display.       Hospital Course     Hospital Course:  Dang Hunt is a 56 y.o. female resents with complaints of shortness of breath.  She states she has been sick for approximately 10 days has had a temperature of 103.  Patient has had nausea lower abdominal pain dyspnea and diarrhea.  She tested positive for Covid on .  She presents to the ED today and was found to be hypoxic with O2 sats between 89 to 96%.  Patient has not been vaccinated for COVID-19.  Patient uses CPAP at night at home not aware of the pressure.  Due to worsening hypoxemia needing 9 L of high flow nasal cannula pulmonary was consulted and she was started on baricitinib.    Patient doing better down to 3 L NC.        DISCHARGE Follow Up Recommendations for labs and diagnostics: Long prednisone taper.  40 mg for 7 days, 30 mg for 7 days, 20 mg for 7 days, 10 mg for 7 days.  Complete course of baricitinib.         Day of Discharge     Vital Signs:  Temp:  [97.6 °F (36.4 °C)-98.5 °F (36.9 °C)] 97.6 °F (36.4 °C)  Heart Rate:  [72-80] 76  Resp:  [16-20] 18  BP: (120-173)/(81-98) 148/98  Flow (L/min):  [3] 3    Physical Exam                         Constitutional: Awake, alert, no acute distress              Eyes: Pupils equal, sclerae anicteric, no  conjunctival injection              HENT: NCAT, mucous membranes moist              Neck: Supple, no thyromegaly, no lymphadenopathy, trachea midline              Respiratory: Rhonchi and decreased breath sounds bilaterally, nonlabored respirations               Cardiovascular: RRR, no murmurs, rubs, or gallops, palpable pedal pulses bilaterally              Gastrointestinal: Positive bowel sounds, soft, nontender, nondistended              Musculoskeletal: No bilateral ankle edema, no clubbing or cyanosis to extremities              Psychiatric: Appropriate affect, cooperative              Neurologic: Oriented x 3, strength symmetric in all extremities, Cranial Nerves grossly intact to confrontation, speech clear              Skin: No rashes        Discharge Details        Discharge Medications      New Medications      Instructions Start Date   baricitinib 2 MG tablet tablet  Commonly known as: OLUMIANT   4 mg, Oral, Every 24 Hours      budesonide-formoterol 160-4.5 MCG/ACT inhaler  Commonly known as: SYMBICORT   2 puffs, Inhalation, 2 Times Daily - RT      famotidine 20 MG tablet  Commonly known as: PEPCID   20 mg, Oral, 2 Times Daily Before Meals      ipratropium-albuterol 0.5-2.5 mg/3 ml nebulizer  Commonly known as: DUO-NEB   3 mL, Nebulization, Every 4 Hours PRN      predniSONE 20 MG tablet  Commonly known as: DELTASONE   20 mg, Oral, Daily, Take 40 mg daily for 1 week 30 mg daily for 1 week 20 mg daily for 1 week 10 mg daily for 1 week         Changes to Medications      Instructions Start Date   furosemide 20 MG tablet  Commonly known as: Lasix  What changed:   · medication strength  · how much to take  · when to take this  · reasons to take this  · additional instructions   20 mg, Oral, 2 Times Daily      potassium chloride 10 MEQ CR tablet  What changed: when to take this   10 mEq, Oral, 2 Times Daily         Continue These Medications      Instructions Start Date   albuterol (2.5 MG/3ML) 0.083% nebulizer  solution  Commonly known as: PROVENTIL   2.5 mg, Nebulization, Every 4 Hours PRN      atorvastatin 20 MG tablet  Commonly known as: LIPITOR   20 mg, Oral, Nightly      metoprolol tartrate 25 MG tablet  Commonly known as: LOPRESSOR   50 mg, Oral, Daily      montelukast 10 MG tablet  Commonly known as: SINGULAIR   10 mg, Oral, Every Morning      venlafaxine XR 75 MG 24 hr capsule  Commonly known as: EFFEXOR-XR   225 mg, Oral, Daily         Stop These Medications    fluticasone-salmeterol 500-50 MCG/DOSE DISKUS  Commonly known as: ADVAIR            No Known Allergies    Discharge Disposition:  Home or Self Care    Diet:  Hospital:  Diet Order   Procedures   • Diet Regular; Cardiac, Consistent Carbohydrate       Discharge Activity:       CODE STATUS:  Code Status and Medical Interventions:   Ordered at: 08/06/21 0342     Level Of Support Discussed With:    Patient     Code Status:    CPR     Medical Interventions (Level of Support Prior to Arrest):    Full         No future appointments.        Pertinent  and/or Most Recent Results     PROCEDURES:       LAB RESULTS:      Lab 08/13/21  0647 08/12/21  0618 08/11/21  0624 08/11/21  0623 08/10/21  0748 08/09/21  1043 08/09/21  0810 08/08/21  0516 08/08/21  0516 08/07/21  0632 08/07/21  0632   WBC 9.79 7.70 6.24  --  6.07  --  7.98   < > 6.85   < > 7.86   HEMOGLOBIN 14.3 14.1 13.0  --  12.9  --  13.1   < > 12.4   < > 12.9   HEMATOCRIT 44.7 45.0 40.7  --  40.4  --  40.7   < > 38.8   < > 39.9   PLATELETS 317 311 285  --  252  --  226   < > 175   < > 161   NEUTROS ABS 8.42* 6.39 5.12  --  4.57  --  6.49   < > 5.78   < > 6.72   IMMATURE GRANS (ABS)  --   --   --   --  0.08*  --  0.06*  --  0.03  --  0.04   LYMPHS ABS  --   --   --   --  1.17  --  1.13  --  0.83  --  0.90   MONOS ABS  --   --   --   --  0.24  --  0.28  --  0.20  --  0.20   EOS ABS  --   --   --   --  0.00  --  0.00  --  0.00  --  0.00   MCV 86.6 88.6 86.2  --  86.7  --  87.5   < > 86.4   < > 85.1   SED RATE  --    --  17  --  25  --  27  --  31*  --  30   CRP  --   --   --  0.94* 2.29*  --  3.78*  --  6.00*  --  5.71*   PROCALCITONIN 0.03  --   --  0.03  --   --  0.05  --   --   --  0.09   LACTATE 1.8  --  1.3  --   --  2.9* 2.4*  --   --   --  1.4    < > = values in this interval not displayed.         Lab 08/13/21  0647 08/12/21  0618 08/11/21  0623 08/10/21  0748 08/09/21  0810 08/08/21  0516 08/08/21  0516 08/07/21  0632 08/07/21  0632 08/06/21  1207   SODIUM 137 138 137 137 138   < > 138   < > 137  --    POTASSIUM 4.5 4.1 4.1 4.0 3.7   < > 3.7   < > 4.0  --    CHLORIDE 100 99 100 100 103   < > 103   < > 105  --    CO2 26.6 29.8* 29.4* 27.0 24.9   < > 23.9   < > 23.5  --    ANION GAP 10.4 9.2 7.6 10.0 10.1   < > 11.1   < > 8.5  --    BUN 39* 35* 29* 20 19   < > 19   < > 15  --    CREATININE 1.01* 1.03* 0.91 0.88 0.90   < > 1.04*   < > 0.90  --    GLUCOSE 164* 161* 146* 127* 103*   < > 133*   < > 142*  --    CALCIUM 8.7 8.6 8.4* 8.5* 8.6   < > 8.5*   < > 8.8  --    MAGNESIUM  --   --  2.0 1.9 1.9  --  1.8  --  2.0  --    PHOSPHORUS  --   --  3.7 3.0 2.8  --  3.2  --  3.1  --    HEMOGLOBIN A1C  --   --   --   --   --   --   --   --   --  5.88*    < > = values in this interval not displayed.         Lab 08/13/21 0647 08/12/21 0618 08/11/21  0623 08/10/21  0748 08/09/21  0810   TOTAL PROTEIN 6.6 6.7 6.3 6.4 6.8   ALBUMIN 3.30* 3.30* 3.10* 3.10* 3.30*   GLOBULIN 3.3 3.4 3.2 3.3 3.5   ALT (SGPT) 15 16 16 17 18   AST (SGOT) 11 13 15 20 27   BILIRUBIN 0.4 0.4 0.4 0.3 0.3   ALK PHOS 113 117 120* 124* 117                 Lab 08/11/21  0623   FERRITIN 251.80*         Brief Urine Lab Results     None        Microbiology Results (last 10 days)     Procedure Component Value - Date/Time    MRSA Screen, PCR (Inpatient) - Swab, Nares [[id number]]  (Normal) Collected: 08/06/21 1204    Lab Status: Final result Specimen: Swab from Nares Updated: 08/06/21 1337     MRSA PCR No MRSA Detected    Legionella Antigen, Urine - Urine, Urine, Clean  Catch [[id number]]  (Normal) Collected: 08/06/21 0541    Lab Status: Final result Specimen: Urine, Clean Catch Updated: 08/06/21 0718     LEGIONELLA ANTIGEN, URINE Negative    S. Pneumo Ag Urine or CSF - Urine, Urine, Clean Catch [[id number]]  (Normal) Collected: 08/06/21 0541    Lab Status: Final result Specimen: Urine, Clean Catch Updated: 08/06/21 0718     Strep Pneumo Ag Negative    COVID PRE-OP / PRE-PROCEDURE SCREENING ORDER (NO ISOLATION) - Swab, Nasopharynx [[id number]]  (Abnormal) Collected: 08/06/21 0221    Lab Status: Final result Specimen: Swab from Nasopharynx Updated: 08/06/21 0954    Narrative:      The following orders were created for panel order COVID PRE-OP / PRE-PROCEDURE SCREENING ORDER (NO ISOLATION) - Swab, Nasopharynx.  Procedure                               Abnormality         Status                     ---------                               -----------         ------                     COVID-19,CEPHEID,COR/NADINE...[[id number]]  Abnormal            Final result                 Please view results for these tests on the individual orders.    COVID-19,CEPHEID,COR/NADINE/PAD/MEHRAN IN-HOUSE(OR EMERGENT/ADD-ON),NP SWAB IN TRANSPORT MEDIA 3-4 HR TAT, RT-PCR - Swab, Nasopharynx [[id number]]  (Abnormal) Collected: 08/06/21 0221    Lab Status: Final result Specimen: Swab from Nasopharynx Updated: 08/06/21 0954     COVID19 Detected    Narrative:      Fact sheet for providers: https://www.fda.gov/media/518811/download     Fact sheet for patients: https://www.fda.gov/media/770261/download  Fact sheet for providers: https://www.fda.gov/media/986186/download     Fact sheet for patients: https://www.fda.gov/media/051774/download    Blood Culture - Blood, Arm, Left [[id number]] Collected: 08/06/21 0010    Lab Status: Final result Specimen: Blood from Arm, Left Updated: 08/11/21 0045     Blood Culture No growth at 5 days    Blood Culture - Blood, Arm, Left [[id number]] Collected: 08/05/21 9393    Lab Status: Final  result Specimen: Blood from Arm, Left Updated: 08/10/21 2300     Blood Culture No growth at 5 days                           Labs Pending at Discharge:        Time spent on Discharge including face to face service: 30 minutes    Electronically signed by Deon Hernandez DO, 08/13/21, 9:49 AM EDT.

## 2021-08-13 NOTE — PROGRESS NOTES
[facility]   Hospitalist Progress Note  Date: 2021  Patient Name: Dang Hunt  : 1965  MRN: [id number]  Date of admission: 2021      Subjective   Subjective     Chief Complaint: Shortness of air    Summary:   Dang Hunt is a 56 y.o. female resents with complaints of shortness of breath.  She states she has been sick for approximately 10 days has had a temperature of 103.  Patient has had nausea lower abdominal pain dyspnea and diarrhea.  She tested positive for Covid on .  She presents to the ED today and was found to be hypoxic with O2 sats between 89 to 96%.  Patient has not been vaccinated for COVID-19.  Patient uses CPAP at night at home not aware of the pressure.  Due to worsening hypoxemia needing 9 L of high flow nasal cannula pulmonary was consulted to consider baricitinib.      Interval Followup:   92% on 3 L NC  No fevers overnight  BS running around 160s.   Creatinine 1.01      Review of Systems   All systems were reviewed and negative except for: Summary and interval follow-up    Objective   Objective     Vitals:   Temp:  [97.7 °F (36.5 °C)-98.5 °F (36.9 °C)] 98.5 °F (36.9 °C)  Heart Rate:  [64-80] 80  Resp:  [16-20] 20  BP: (120-173)/(81-96) 173/96  Flow (L/min):  [3] 3  Physical Exam    Constitutional: Awake, alert, no acute distress   Eyes: Pupils equal, sclerae anicteric, no conjunctival injection   HENT: NCAT, mucous membranes moist   Neck: Supple, no thyromegaly, no lymphadenopathy, trachea midline   Respiratory: Rhonchi and decreased breath sounds bilaterally, nonlabored respirations    Cardiovascular: RRR, no murmurs, rubs, or gallops, palpable pedal pulses bilaterally   Gastrointestinal: Positive bowel sounds, soft, nontender, nondistended   Musculoskeletal: No bilateral ankle edema, no clubbing or cyanosis to extremities   Psychiatric: Appropriate affect, cooperative   Neurologic: Oriented x 3, strength symmetric in all extremities, Cranial Nerves grossly  intact to confrontation, speech clear   Skin: No rashes     Result Review    Result Review:  I have personally reviewed the results from the time of this admission to 8/13/2021 07:42 EDT and agree with these findings:  [x]  Laboratory  [x]  Microbiology  [x]  Radiology  [x]  EKG/Telemetry   []  Cardiology/Vascular   []  Pathology  [x]  Old records  [x]  Other: Medications  Assessment/Plan   Assessment / Plan     Assessment:  #1 Covid pneumonia with acute hypoxic respiratory failure  -O2 requirement improving.  Patient down to 3 L nasal cannula.  -Prone ventilation encouraged.  -Finished 5 days of IV remdesivir August 10  -Continue IV Decadron.  10 mg twice a day  -Rocephin and Zithromax for possible con commitment community-acquired pneumonia.  Thus far, strep pneumo antigens negative.  -No Actemra was available due to shortage last week  -Started on baricitinib day 4/14  -continue nebs    #2 Morbid obesity BMI 40.6  -Thereby, increasing the risk of worse outcome with Covid pneumonia.    #3 NIDDM  -Hemoglobin A1c of 5.8%  -Blood sugars running in the 160s on Decadron  -Insulin sliding scale    #4 MANI with nocturnal CPAP use.    #5 hypertension controlled with current regimen    #6 hyperlipidemia controlled with current regimen    #7 Headache-Try Toradol and Fioricet.      DVT prophylaxis:  Medical DVT prophylaxis orders are present.    CODE STATUS:   Level Of Support Discussed With: Patient  Code Status: CPR  Medical Interventions (Level of Support Prior to Arrest): Full    Deon Hernandez DO        .

## 2021-08-13 NOTE — SIGNIFICANT NOTE
08/13/21 1328   Plan   Final Discharge Disposition Code 01 - home or self-care   Final Note Pt dishcarged home. Pt has O2 through [employer]. Dc medications sent to pharmacy. Pt will not be prescribed Oluminant as it is only adminstered in hospital setting. MD aware and discontinued medication.

## 2021-08-13 NOTE — PROGRESS NOTES
Pulmonary / Critical Care Progress Note      Patient Name: Dang Hunt  : 1965  MRN: [id number]  Attending:  Deon Hernandez DO  Date of admission: 2021    Subjective   Subjective   Follow-up for COVID-19, hypoxemia    Over past 24 hours:  3 L of oxygen  Feels better  No cough  Dyspnea improved.  Mostly with activity  No wheezing, headaches, chest pain or hemoptysis  No nausea, fevers or chills  Agreeable to going home      Review of Systems  General: Fatigue and weakness, otherwise denied complaints  Cardiovascular:  Denied complaints  Respiratory: Dyspnea, cough, otherwise denied complaints  Gastrointestinal: Denied complaints        Objective   Objective     Vitals:   Temp:  [97.5 °F (36.4 °C)-98.5 °F (36.9 °C)] 97.5 °F (36.4 °C)  Heart Rate:  [72-80] 74  Resp:  [16-20] 18  BP: (110-173)/(74-98) 110/74  Flow (L/min):  [3] 3    Physical Exam   Vital Signs Reviewed   WDWN, Alert, NAD.    HEENT:  PERRL, EOMI.  OP, nares clear, no sinus tenderness  Neck:  Supple, no JVD, no thyromegaly  Chest:  good aeration,  decreasing crackles and rhonchi bilaterally, tympanic to percussion bilaterally, no work of breathing noted  CV: RRR, no MGR, pulses 2+, equal.  Abd:  Soft, NT, ND, + BS, no HSM, obese  EXT:  no clubbing, no cyanosis,  no edema  Neuro:  A&Ox3, CN grossly intact, no focal deficits.  Skin: No rashes or lesions noted      Result Review    Result Review:  I have personally reviewed the results from the time of this admission to 2021 13:48 EDT and agree with these findings:  [x]  Laboratory  []  Microbiology  []  Radiology  []  EKG/Telemetry   []  Cardiology/Vascular   []  Pathology  []  Old records  []  Other:      Assessment/Plan   Assessment / Plan     Active Hospital Problems:  Active Hospital Problems    Diagnosis    • COVID-19 virus infection    • Acute respiratory failure with hypoxia (CMS/HCC)          Impression:  Acute hypoxemic respiratory failure  COVID-19 pneumonia  Acute exacerbation  of COPD  Therapeutic drug monitoring of remdesivir  Obstructive sleep apnea on nighttime CPAP with 2 L of oxygen bled in  Morbid obesity with BMI 40.69     Plan:  Can stop remdesivir early and send patient home  Complete 14 days of barcitinib.    Discharge on Decadron to complete 10 days  Continue nebulizers and bronchopulmonary hygiene.  Can discharge on inhaled steroid  Continue Lasix 40 mg IV twice daily.  Trend renal function and electrolytes   Encourage prone positioning.  Otherwise, get patient out of bed and into chair  Encourage incentive spirometer use  Continue NIPPV nightly with naps on current settings  Wean O2 to keep SPO2 greater than 90%  Trend inflammatory markers    DVT prophylaxis:  Medical DVT prophylaxis orders are present.    CODE STATUS:   Level Of Support Discussed With: Patient  Code Status: CPR  Medical Interventions (Level of Support Prior to Arrest): Full    Okay to discharge from my perspective.  Can send home with oxygen, finish course of Decadron, Symbicort.  Pulmonary follow-up in 2 weeks    Labs, notes and medications personally reviewed.  Discussed with primary    I will sign off.  Please call for questions.    Electronically signed by Skyler Gonzalez MD, 08/13/21, 1:48 PM EDT.

## 2021-08-14 NOTE — OUTREACH NOTE
COVID-19 Week 1 Survey      Responses   [facility] patient discharged from?  [facility]   Does the patient have one of the following disease processes/diagnoses(primary or secondary)?  COVID-19   COVID-19 underlying condition?  None   Call Number  Call 1   Week 1 Call successful?  Yes   Call start time  1148   Call end time  1156   Discharge diagnosis  covid Acute respiratory failure with hypoxia   Meds reviewed with patient/caregiver?  Yes   Is the patient having any side effects they believe may be caused by any medication additions or changes?  No   Does the patient have all medications ordered at discharge?  Yes   Is the patient taking all medications as directed (includes completed medication regime)?  Yes   Medication comments  Encouraged to completed steroids as ordered   Comments regarding PCP  Reminded to schedule PCP f/u appt   Has the patient kept scheduled appointments due by today?  N/A   Has home health visited the patient within 72 hours of discharge?  N/A   Psychosocial issues?  No   Did the patient receive a copy of their discharge instructions?  Yes   Did the patient receive a copy of COVID-19 specific instructions?  Yes   Nursing interventions  Reviewed instructions with patient   What is the patient's perception of their health status since discharge?  Same [Patient reports no concerns--resting at time of call.  Encouraged to balance rest with gradually building with activity to increase endurance.  Encouraged use of nebs and I.S....pt v/u ]   Does the patient have any of the following symptoms?  Cough, Loss of taste/smell   Nursing Interventions  Nurse provided patient education [.  Dis'd s/s of worsening condition which require MD notification such as increased SOA, change in amount/color of mucus, chest pain, fever, increased cough-v/u.]   Pulse Ox monitoring  Intermittent   Pulse Ox device source  Patient, Hospital   O2 Sat comments  % on room air   O2 Sat: education provided  Sat  levels, When to seek care   O2 Sat education comments  Advised of normal sat readings above 90%--urgent concerns would be a sudden decrease in sats below 90% along with increased effort of breathing, bluish discoloration to finger/nails and around mouth--v/u.   Is the patient/caregiver able to teach back steps to recovery at home?  Set small, achievable goals for return to baseline health, Rest and rebuild strength, gradually increase activity   If the patient is a current smoker, are they able to teach back resources for cessation?  Not a smoker   Is the patient/caregiver able to teach back the hierarchy of who to call/visit for symptoms/problems? PCP, Specialist, Home health nurse, Urgent Care, ED, 911  Yes [Reminded of 24/7 [facility] Covid nurse Call Center]   COVID-19 call completed?  Yes          Jennifer Brewer RN

## 2021-08-15 ENCOUNTER — READMISSION MANAGEMENT (OUTPATIENT)
Dept: CALL CENTER | Facility: HOSPITAL | Age: 56
End: 2021-08-15

## 2021-08-15 NOTE — OUTREACH NOTE
COVID-19 Week 1 Survey      Responses   [facility] patient discharged from?  [facility]   Does the patient have one of the following disease processes/diagnoses(primary or secondary)?  COVID-19   COVID-19 underlying condition?  None   Call Number  Call 2   Week 1 Call successful?  No   Discharge diagnosis  covid Acute respiratory failure with hypoxia          Jennifer Abdalla RN

## 2021-08-16 ENCOUNTER — READMISSION MANAGEMENT (OUTPATIENT)
Dept: CALL CENTER | Facility: HOSPITAL | Age: 56
End: 2021-08-16

## 2021-08-16 NOTE — OUTREACH NOTE
COVID-19 Week 1 Survey      Responses   [facility] patient discharged from?  [facility]   Does the patient have one of the following disease processes/diagnoses(primary or secondary)?  COVID-19   COVID-19 underlying condition?  None   Call Number  Call 3   Week 1 Call successful?  No   Discharge diagnosis  covid Acute respiratory failure with hypoxia          Tisah Stauffer RN

## 2021-08-19 ENCOUNTER — READMISSION MANAGEMENT (OUTPATIENT)
Dept: CALL CENTER | Facility: HOSPITAL | Age: 56
End: 2021-08-19

## 2021-08-19 NOTE — OUTREACH NOTE
COVID-19 Week 1 Survey      Responses   [facility] patient discharged from?  [facility]   Does the patient have one of the following disease processes/diagnoses(primary or secondary)?  COVID-19   COVID-19 underlying condition?  None   Call Number  Call 4   Week 1 Call successful?  Yes   Call start time  1130   Call end time  1132   Discharge diagnosis  covid Acute respiratory failure with hypoxia   Meds reviewed with patient/caregiver?  Yes   Is the patient having any side effects they believe may be caused by any medication additions or changes?  No   Does the patient have all medications ordered at discharge?  Yes   Is the patient taking all medications as directed (includes completed medication regime)?  Yes   Does the patient have a primary care provider?   Yes   Comments regarding PCP  Reminded to schedule PCP f/u appt   Does the patient have an appointment with their PCP or specialist within 7 days of discharge?  No   What is preventing the patient from scheduling follow up appointments within 7 days of discharge?  Haven't had time   Nursing Interventions  Educated patient on importance of making appointment, Advised patient to make appointment   Has the patient kept scheduled appointments due by today?  N/A   Has home health visited the patient within 72 hours of discharge?  N/A   Psychosocial issues?  No   Did the patient receive a copy of their discharge instructions?  Yes   Did the patient receive a copy of COVID-19 specific instructions?  Yes   Nursing interventions  Reviewed instructions with patient   What is the patient's perception of their health status since discharge?  Improving   Does the patient have any of the following symptoms?  Cough, Loss of taste/smell   Nursing Interventions  Nurse provided patient education   Pulse Ox monitoring  Intermittent   Pulse Ox device source  Hospital   O2 Sat comments  % on room air   O2 Sat: education provided  Sat levels, When to seek care   Is the  patient/caregiver able to teach back steps to recovery at home?  Set small, achievable goals for return to baseline health, Rest and rebuild strength, gradually increase activity, Make a list of questions for provider's appointment   If the patient is a current smoker, are they able to teach back resources for cessation?  Not a smoker   Is the patient/caregiver able to teach back the hierarchy of who to call/visit for symptoms/problems? PCP, Specialist, Home health nurse, Urgent Care, ED, 911  Yes   COVID-19 call completed?  Yes   Wrap up additional comments  Quick call. Patient reports doing much better. No issues or concerns.           Morteza Roberts RN

## 2021-08-22 ENCOUNTER — READMISSION MANAGEMENT (OUTPATIENT)
Dept: CALL CENTER | Facility: HOSPITAL | Age: 56
End: 2021-08-22

## 2021-08-22 NOTE — OUTREACH NOTE
COVID-19 Week 2 Survey      Responses   [facility] patient discharged from?  [facility]   Does the patient have one of the following disease processes/diagnoses(primary or secondary)?  COVID-19   COVID-19 underlying condition?  None   Call Number  Call 1   COVID-19 Week 2: Call 1 attempt successful?  Yes   Call start time  1411   Call end time  1418   Discharge diagnosis  covid Acute respiratory failure with hypoxia   Is patient permission given to speak with other caregiver?  Yes   List who call center can speak with     Meds reviewed with patient/caregiver?  Yes   Is the patient having any side effects they believe may be caused by any medication additions or changes?  No   Does the patient have all medications ordered at discharge?  No   What is keeping the patient from filling the prescriptions?  Pre-authorization in progress [was just told that the Baricitinib was authorized by her ins. She is questioning why she is prescribed this med as it is for RA, which she says she was not told that she has. Advised her to call PCP or Rheumatologist for clarification. ]   Nursing Interventions  Nurse provided patient education, Nurse advised patient to call provider   Is the patient taking all medications as directed (includes completed medication regime)?  No   What is preventing the patient from taking all medications as directed?  Other   Nursing Interventions  Nurse provided patient education, Advised patient to call provider   Does the patient have an appointment with their PCP or specialist within 7 days of discharge?  No   What is preventing the patient from scheduling follow up appointments within 7 days of discharge?  Haven't had time   Nursing Interventions  Educated patient on importance of making appointment   Psychosocial issues?  No   Comments  She is working with [facility] for deep breathing. appetite decreased   What is the patient's perception of their health status since discharge?  Improving   Does  the patient have any of the following symptoms?  Loss of taste/smell [shaking, maybe from steroids, weakness remains.]   Pulse Ox monitoring  Intermittent   Pulse Ox device source  Hospital   O2 Sat comments  RA- 98%   O2 Sat: education provided  Sat levels   Is the patient/caregiver able to teach back steps to recovery at home?  Eat a well-balance diet   If the patient is a current smoker, are they able to teach back resources for cessation?  Not a smoker   Is the patient/caregiver able to teach back the hierarchy of who to call/visit for symptoms/problems? PCP, Specialist, Home health nurse, Urgent Care, ED, 911  Yes   COVID-19 call completed?  Yes          Tisha Stauffer, RN

## 2021-08-31 ENCOUNTER — READMISSION MANAGEMENT (OUTPATIENT)
Dept: CALL CENTER | Facility: HOSPITAL | Age: 56
End: 2021-08-31

## 2021-08-31 NOTE — OUTREACH NOTE
COVID-19 Week 3 Survey      Responses   [facility] patient discharged from?  [facility]   Does the patient have one of the following disease processes/diagnoses(primary or secondary)?  COVID-19   COVID-19 underlying condition?  None   Call Number  Call 1   COVID-19 Week 3: Call 1 attempt successful?  Yes   Call start time  1216   Call end time  1219   Discharge diagnosis  covid Acute respiratory failure with hypoxia   Meds reviewed with patient/caregiver?  Yes   Is the patient having any side effects they believe may be caused by any medication additions or changes?  No   Does the patient have all medications ordered at discharge?  Yes   Is the patient taking all medications as directed (includes completed medication regime)?  Yes   Does the patient have a primary care provider?   Yes   Comments regarding PCP  PATIENT STATES SHE DID GET AN APPOINTMENT WITH HER PCP, BUT IT'S A COUPLE WEEKS OUT.    Does the patient have an appointment with their PCP or specialist within 7 days of discharge?  Greater than 7 days   Has the patient kept scheduled appointments due by today?  N/A   Has home health visited the patient within 72 hours of discharge?  N/A   Psychosocial issues?  No   Did the patient receive a copy of their discharge instructions?  Yes   Did the patient receive a copy of COVID-19 specific instructions?  Yes   Nursing interventions  Reviewed instructions with patient   What is the patient's perception of their health status since discharge?  Improving   Does the patient have any of the following symptoms?  None [PATIENT ALSO REPORTS LOW ENERGY]   Nursing Interventions  Nurse provided patient education   Pulse Ox monitoring  Intermittent   Pulse Ox device source  Hospital   O2 Sat comments  PATIENT STATES O2 SATS ARE STAYING ABOUT 94%   O2 Sat: education provided  Sat levels   Is the patient/caregiver able to teach back steps to recovery at home?  Set small, achievable goals for return to baseline health, Rest  and rebuild strength, gradually increase activity, Make a list of questions for provider's appointment   If the patient is a current smoker, are they able to teach back resources for cessation?  Not a smoker   Is the patient/caregiver able to teach back the hierarchy of who to call/visit for symptoms/problems? PCP, Specialist, Home health nurse, Urgent Care, ED, 911  Yes   COVID-19 call completed?  Yes          Rissa Gloria LPN

## 2021-09-07 ENCOUNTER — READMISSION MANAGEMENT (OUTPATIENT)
Dept: CALL CENTER | Facility: HOSPITAL | Age: 56
End: 2021-09-07

## 2021-09-07 NOTE — OUTREACH NOTE
COVID-19 Week 4 Survey      Responses   [facility] patient discharged from?  [facility]   Does the patient have one of the following disease processes/diagnoses(primary or secondary)?  COVID-19   COVID-19 underlying condition?  None   Call Number  Call 1   COVID-19 Week 4: Call 1 attempt successful?  No   Discharge diagnosis  covid Acute respiratory failure with hypoxia          Elizabeth Johnston RN

## 2021-11-12 RX ORDER — MONTELUKAST SODIUM 10 MG/1
TABLET ORAL
Qty: 90 TABLET | Refills: 1 | OUTPATIENT
Start: 2021-11-12

## 2022-01-04 NOTE — PROGRESS NOTES
Chief Complaint  Congestive Heart Failure and Hypertension      History of Present Illness  Dang Hunt presents to [facility]  Patient is a 56-year-old female who has a prior history of hypertension, dyslipidemia, and previous historical CHF diagnosed approximately 8 years ago.  She has been on Lasix 20 mg since then and attempts to discontinue this have resulted in increasing shortness of breath and dyspnea on exertion symptoms.  Patient has had to use extra milligram tablets as needed intermittently for worsening symptoms at times.  The symptoms have been stable with no significant worsening in the recent past.  Denies any ongoing chest pain symptoms    Past Medical History:   Diagnosis Date   • Arthritis    • Asthma    • CHF (congestive heart failure) ([facility])    • Colitis    • COPD (chronic obstructive pulmonary disease) ([facility])    • Depression    • Dysphagia    • Edema    • Fatigue    • GERD (gastroesophageal reflux disease)    • History of acute pancreatitis    • History of histoplasmosis    • HTN (hypertension)    • Hyperlipidemia    • Light headed    • Migraines    • Multiple joint pain    • On home oxygen therapy     2 L AT NIGHT   • MANI (obstructive sleep apnea)     WEARS CPAP   • RLS (restless legs syndrome)          Current Outpatient Medications:   •  Advair Diskus 500-50 MCG/DOSE DISKUS, INHALE 1 PUFF BY MOUTH TWICE DAILY, Disp: 60 each, Rfl: 9  •  albuterol (PROVENTIL) (2.5 MG/3ML) 0.083% nebulizer solution, Take 2.5 mg by nebulization Every 4 (Four) Hours As Needed for wheezing., Disp: , Rfl:   •  Anoro Ellipta 62.5-25 MCG/INH aerosol powder  inhaler, Inhale 1 puff Daily., Disp: , Rfl:   •  atorvastatin (LIPITOR) 40 MG tablet, Take 40 mg by mouth Every Night., Disp: , Rfl:   •  chlorthalidone (HYGROTON) 25 MG tablet, Take 25 mg by mouth Daily., Disp: , Rfl:   •  conjugated estrogens (Premarin) 0.625 MG/GM vaginal cream, Premarin 0.625 mg/gram vaginal cream  INSERT 0.625MG  VAGINALLY EVERY DAY FOR 21 DAYS, THEN OFF FOR 7 DAYS; REPEAT CYCLE, Disp: , Rfl:   •  furosemide (LASIX) 40 MG tablet, Take 40 mg by mouth. 1/2-1 tab po qd depending on weight, Disp: , Rfl:   •  ipratropium-albuterol (DUO-NEB) 0.5-2.5 mg/3 ml nebulizer, Take 3 mL by nebulization Every 4 (Four) Hours As Needed for Shortness of Air., Disp: 360 mL, Rfl: 0  •  ketotifen (ZADITOR) 0.025 % ophthalmic solution, instill 1 drop IN EACH EYE TWICE DAILY AS NEEDED (AT least 8 hours APART), Disp: , Rfl:   •  metoprolol tartrate (LOPRESSOR) 50 MG tablet, Take 50 mg by mouth Daily., Disp: , Rfl:   •  montelukast (SINGULAIR) 10 MG tablet, Take 10 mg by mouth Every Morning., Disp: , Rfl:   •  oxyCODONE-acetaminophen (PERCOCET) 5-325 MG per tablet, Take 1 tablet by mouth Every 8 (Eight) Hours As Needed., Disp: , Rfl:   •  potassium chloride (K-DUR,KLOR-CON) 10 MEQ ER tablet, Take 10 mEq by mouth. 1/2 tab po qd, Disp: , Rfl:   •  venlafaxine XR (EFFEXOR-XR) 75 MG 24 hr capsule, Take 75 mg by mouth Daily., Disp: , Rfl:   •  vitamin D3 125 MCG (5000 UT) capsule capsule, Take 1 capsule by mouth Daily., Disp: , Rfl:   •  famotidine (PEPCID) 20 MG tablet, Take 1 tablet by mouth 2 (Two) Times a Day Before Meals., Disp: 30 tablet, Rfl: 1    Medications Discontinued During This Encounter   Medication Reason   • potassium chloride (K-DUR) 10 MEQ CR tablet Duplicate order   • furosemide (Lasix) 20 MG tablet Duplicate order   • predniSONE (DELTASONE) 20 MG tablet *Therapy completed   • Symbicort 160-4.5 MCG/ACT inhaler Discontinued by another clinician     No Known Allergies     Social History     Tobacco Use   • Smoking status: Current Every Day Smoker     Packs/day: 0.50     Years: 25.00     Pack years: 12.50     Types: Cigarettes     Start date: 1985   • Smokeless tobacco: Never Used   Vaping Use   • Vaping Use: Never used   Substance Use Topics   • Alcohol use: No   • Drug use: No       Family History   Problem Relation Age of Onset   •  "Obesity Mother    • Hypertension Mother    • COPD Mother    • Obesity Sister    • Hypertension Sister    • Obesity Maternal Grandmother    • Hypertension Maternal Grandmother    • Stroke Maternal Grandmother    • Heart attack Maternal Grandmother    • Arrhythmia Father    • Hypertension Father         Objective     /79   Pulse 86   Ht 165.1 cm (65\")   Wt 115 kg (253 lb)   BMI 42.10 kg/m²       Physical Exam    General Appearance:   · no acute distress  · Alert and oriented x3  HENT:   · lips not cyanotic  · Atraumatic  Neck:  · No jvd   · supple  Respiratory:  · no respiratory distress  · normal breath sounds  · no rales  Cardiovascular:  · Regular rate and rhythm  · no S3, no S4   · no murmur  · no rub  Extremities  · No cyanosis  · lower extremity edema: Trace  Skin:   · warm, dry  · No rashes    Result Review :     No results found for: PROBNP  CMP    CMP 8/11/21 8/12/21 8/13/21   Glucose 146 (A) 161 (A) 164 (A)   BUN 29 (A) 35 (A) 39 (A)   Creatinine 0.91 1.03 (A) 1.01 (A)   eGFR Non African Am 64 56 (A) 57 (A)   Sodium 137 138 137   Potassium 4.1 4.1 4.5   Chloride 100 99 100   Calcium 8.4 (A) 8.6 8.7   Albumin 3.10 (A) 3.30 (A) 3.30 (A)   Total Bilirubin 0.4 0.4 0.4   Alkaline Phosphatase 120 (A) 117 113   AST (SGOT) 15 13 11   ALT (SGPT) 16 16 15   (A) Abnormal value            CBC w/diff    CBC w/Diff 8/11/21 8/12/21 8/13/21   WBC 6.24 7.70 9.79   RBC 4.72 5.08 5.16   Hemoglobin 13.0 14.1 14.3   Hematocrit 40.7 45.0 44.7   MCV 86.2 88.6 86.6   MCH 27.5 27.8 27.7   MCHC 31.9 31.3 (A) 32.0   RDW 15.4 15.4 14.9   Platelets 285 311 317   (A) Abnormal value             Lab Results   Component Value Date    TSH 2.140 01/13/2017      No results found for: FREET4   No results found for: DDIMERQUANT  Magnesium   Date Value Ref Range Status   08/11/2021 2.0 1.6 - 2.6 mg/dL Final      No results found for: DIGOXIN   No results found for: TROPONINT   Lab Results   Component Value Date    POCTROP 0.00 " 01/15/2020   (                        ECG 12 Lead    Date/Time: 1/11/2022 1:36 PM  Performed by: Edenilson Rodriguez MD  Authorized by: Edenilson Rodriguez MD   Comparison: not compared with previous ECG   Previous ECG: no previous ECG available  Rhythm: sinus rhythm           No results found for this or any previous visit.             The ASCVD Risk score (Marcia BRIELLE Corona., et al., 2013) failed to calculate for the following reasons:    Cannot find a previous HDL lab    Cannot find a previous total cholesterol lab     Diagnoses and all orders for this visit:    1. OBRIEN (dyspnea on exertion) (Primary)  Assessment & Plan:  Patient with persistent intermittent issues worse with discontinuation of diuretic check an echocardiogram and proBNP level counseled on low-sodium diet    Orders:  -     proBNP; Future  -     Adult Transthoracic Echo Complete W/ Cont if Necessary Per Protocol; Future    2. Congestive heart failure, unspecified HF chronicity, unspecified heart failure type (HCC)  Assessment & Plan:  We will obtain previous echocardiogram results for baseline ejection fraction patient was counseled on keeping a daily weight log continuing to use as needed extra Lasix as needed for weight gain more than 5 pounds per week. We will repeat echocardiogram and proBNP level. Patient's blood pressure stable on Lopressor 50 twice daily      3. Essential hypertension  Assessment & Plan:  Blood pressures controlled on chlorthalidone 25 And Lopressor 50 twice daily dosing      4. Hyperlipidemia, unspecified hyperlipidemia type  Assessment & Plan:  Previous triglyceride elevations in the 600 range we will repeat counseled on nutrition changes    Orders:  -     Lipid Panel; Future    Other orders  -     ECG 12 Lead          Follow Up     Return in about 6 months (around 7/11/2022).          Patient was given instructions and counseling regarding her condition or for health maintenance advice. Please see specific information pulled into the AVS if  appropriate.

## 2022-01-11 ENCOUNTER — OFFICE VISIT (OUTPATIENT)
Dept: CARDIOLOGY | Facility: CLINIC | Age: 57
End: 2022-01-11

## 2022-01-11 VITALS
HEIGHT: 65 IN | BODY MASS INDEX: 42.15 KG/M2 | HEART RATE: 86 BPM | SYSTOLIC BLOOD PRESSURE: 128 MMHG | DIASTOLIC BLOOD PRESSURE: 79 MMHG | WEIGHT: 253 LBS

## 2022-01-11 DIAGNOSIS — R06.09 DOE (DYSPNEA ON EXERTION): Primary | ICD-10-CM

## 2022-01-11 DIAGNOSIS — I50.9 CONGESTIVE HEART FAILURE, UNSPECIFIED HF CHRONICITY, UNSPECIFIED HEART FAILURE TYPE: ICD-10-CM

## 2022-01-11 DIAGNOSIS — E78.5 HYPERLIPIDEMIA, UNSPECIFIED HYPERLIPIDEMIA TYPE: ICD-10-CM

## 2022-01-11 DIAGNOSIS — I10 ESSENTIAL HYPERTENSION: ICD-10-CM

## 2022-01-11 PROCEDURE — 93000 ELECTROCARDIOGRAM COMPLETE: CPT | Performed by: INTERNAL MEDICINE

## 2022-01-11 PROCEDURE — 99204 OFFICE O/P NEW MOD 45 MIN: CPT | Performed by: INTERNAL MEDICINE

## 2022-01-11 RX ORDER — CONJUGATED ESTROGENS 0.62 MG/G
CREAM VAGINAL
COMMUNITY
End: 2022-03-21

## 2022-01-11 RX ORDER — CHLORTHALIDONE 25 MG/1
25 TABLET ORAL DAILY
COMMUNITY
Start: 2021-12-14 | End: 2023-03-09 | Stop reason: SDUPTHER

## 2022-01-11 RX ORDER — FUROSEMIDE 40 MG/1
40 TABLET ORAL
COMMUNITY
End: 2023-01-30 | Stop reason: SDUPTHER

## 2022-01-11 RX ORDER — KETOTIFEN FUMARATE 0.35 MG/ML
SOLUTION/ DROPS OPHTHALMIC
COMMUNITY
Start: 2021-12-06 | End: 2023-03-09 | Stop reason: SDUPTHER

## 2022-01-11 RX ORDER — OXYCODONE HYDROCHLORIDE AND ACETAMINOPHEN 5; 325 MG/1; MG/1
1 TABLET ORAL EVERY 8 HOURS PRN
COMMUNITY
Start: 2021-12-09

## 2022-01-11 RX ORDER — POTASSIUM CHLORIDE 750 MG/1
10 TABLET, FILM COATED, EXTENDED RELEASE ORAL
COMMUNITY
End: 2022-12-30 | Stop reason: SDUPTHER

## 2022-01-11 NOTE — ASSESSMENT & PLAN NOTE
We will obtain previous echocardiogram results for baseline ejection fraction patient was counseled on keeping a daily weight log continuing to use as needed extra Lasix as needed for weight gain more than 5 pounds per week. We will repeat echocardiogram and proBNP level. Patient's blood pressure stable on Lopressor 50 twice daily

## 2022-01-11 NOTE — ASSESSMENT & PLAN NOTE
Patient with persistent intermittent issues worse with discontinuation of diuretic check an echocardiogram and proBNP level counseled on low-sodium diet

## 2022-01-17 ENCOUNTER — PATIENT MESSAGE (OUTPATIENT)
Dept: CARDIOLOGY | Facility: CLINIC | Age: 57
End: 2022-01-17

## 2022-01-20 ENCOUNTER — LAB (OUTPATIENT)
Dept: LAB | Facility: HOSPITAL | Age: 57
End: 2022-01-20

## 2022-01-20 DIAGNOSIS — E78.5 HYPERLIPIDEMIA, UNSPECIFIED HYPERLIPIDEMIA TYPE: ICD-10-CM

## 2022-01-20 DIAGNOSIS — R06.09 DOE (DYSPNEA ON EXERTION): ICD-10-CM

## 2022-01-20 LAB
CHOLEST SERPL-MCNC: 176 MG/DL (ref 0–200)
HDLC SERPL-MCNC: 30 MG/DL (ref 40–60)
LDLC SERPL CALC-MCNC: 96 MG/DL (ref 0–100)
LDLC/HDLC SERPL: 2.91 {RATIO}
NT-PROBNP SERPL-MCNC: 29.7 PG/ML (ref 0–900)
TRIGL SERPL-MCNC: 293 MG/DL (ref 0–150)
VLDLC SERPL-MCNC: 50 MG/DL (ref 5–40)

## 2022-01-20 PROCEDURE — 36415 COLL VENOUS BLD VENIPUNCTURE: CPT

## 2022-01-20 PROCEDURE — 80061 LIPID PANEL: CPT

## 2022-01-20 PROCEDURE — 83880 ASSAY OF NATRIURETIC PEPTIDE: CPT

## 2022-03-14 NOTE — PROGRESS NOTES
Primary Care Provider  Maria Elena Cannon     Referring Provider  No ref. provider found     Chief Complaint  Sleep Apnea, Asthma, and Shortness of Breath (No more than usual. )    Subjective          History of Presenting Illness  Patient is a 56-year-old female, patient Dr. Solis's with morbid obesity, sleep apnea, diastolic heart failure, and asthma/COPD overlap syndrome who presents for follow-up visit today.   Patient states that she will get short of breath that is worse with exertion, climbing up stairs, moderate severity, and improved with rest.  Patient states at times she does have a productive cough with clear to white sputum mainly in the mornings.  Patient states that she did have COVID-19 pneumonia back in September, 2021 and has been more short of breath since having Covid.  Patient states that she is taking Advair every day as prescribed and uses albuterol inhaler and albuterol nebulizer treatments as needed.  Patient states that she has been wearing her CPAP machine at night which helps her to sleep.  Patient denies any morning headaches or excessive daytime sleepiness.  Patient states that she still smoking a half a pack of cigarettes a day and is interested in quitting. Patient denies fever, chills, night sweats, swollen glands in the head and neck, unintentional weight loss, hemoptysis, purulent sputum production, dysphagia, chest pain, palpitations, chest tightness, abdominal pain, nausea, vomiting, and diarrhea.  Patient also denies any myalgias, changes in sense of taste and/or smell, sore throat, any other coronavirus or flu-like symptoms.  Patient denies any leg swelling, orthopnea, paroxysmal nocturnal dyspnea.  Patient is able to perform activities of daily living.      Review of Systems   Constitutional: Negative for activity change, appetite change, chills, diaphoresis, fatigue, fever, unexpected weight gain and unexpected weight loss.        Negative for Insomnia   HENT: Negative for  congestion (Nasal), mouth sores, nosebleeds, postnasal drip, sore throat, swollen glands and trouble swallowing.         Negative for Thrush  Negative for Hoarseness  Negative for Allergies/Hay Fever  Negative for Recent Head injury  Negative for Ear Fullness  Negative for Nasal or Sinus pain  Negative for Dry lips  Negative for Nasal discharge   Respiratory: Positive for cough (intermittent) and shortness of breath. Negative for apnea, chest tightness and wheezing.         Negative for Hemoptysis  Negative for Pleuritic pain   Cardiovascular: Negative for chest pain, palpitations and leg swelling.        Negative for Claudication  Negative for Cyanosis  Negative for Dyspnea on exertion   Gastrointestinal: Negative for abdominal pain, diarrhea, nausea, vomiting and GERD.   Musculoskeletal: Negative for joint swelling and myalgias.        Negative for Joint pain  Negative for Joint stiffness   Skin: Negative for color change, dry skin, pallor and rash.   Neurological: Negative for syncope, weakness and headache.   Hematological: Negative for adenopathy. Does not bruise/bleed easily.        Family History   Problem Relation Age of Onset   • Obesity Mother    • Hypertension Mother    • COPD Mother    • Obesity Sister    • Hypertension Sister    • Obesity Maternal Grandmother    • Hypertension Maternal Grandmother    • Stroke Maternal Grandmother    • Heart attack Maternal Grandmother    • Arrhythmia Father    • Hypertension Father         Social History     Socioeconomic History   • Marital status:    • Number of children: 2   Tobacco Use   • Smoking status: Light Tobacco Smoker     Packs/day: 0.50     Years: 35.00     Pack years: 17.50     Types: Cigarettes     Start date: 1/1/1985   • Smokeless tobacco: Never Used   Vaping Use   • Vaping Use: Never used   Substance and Sexual Activity   • Alcohol use: No   • Drug use: No   • Sexual activity: Yes     Partners: Male     Birth control/protection: Surgical         Past Medical History:   Diagnosis Date   • Arthritis    • Asthma    • CHF (congestive heart failure) ([facility])    • Colitis    • COPD (chronic obstructive pulmonary disease) ([facility])    • Depression    • Dysphagia    • Edema    • Fatigue    • GERD (gastroesophageal reflux disease)    • History of acute pancreatitis    • History of histoplasmosis    • HTN (hypertension)    • Hyperlipidemia    • Light headed    • Migraines    • Multiple joint pain    • On home oxygen therapy     2 L AT NIGHT   • MANI (obstructive sleep apnea)     WEARS CPAP   • RLS (restless legs syndrome)         Immunization History   Administered Date(s) Administered   • COVID-19 (MODERNA) 1st, 2nd, 3rd Dose Only 01/11/2022   • Flu Vaccine Quad PF >36MO 09/13/2016   • Influenza Quad Vaccine (Inpatient) 12/14/2012   • PPD Test 12/14/2012   • Td 05/24/2002       No Known Allergies       Current Outpatient Medications:   •  albuterol (PROVENTIL) (2.5 MG/3ML) 0.083% nebulizer solution, Take 2.5 mg by nebulization Every 4 (Four) Hours As Needed for Wheezing or Shortness of Air for up to 30 days., Disp: 180 each, Rfl: 11  •  albuterol sulfate  (90 Base) MCG/ACT inhaler, Inhale 2 puffs Every 4 (Four) Hours As Needed for Wheezing or Shortness of Air for up to 30 days., Disp: 18 g, Rfl: 11  •  atorvastatin (LIPITOR) 40 MG tablet, Take 40 mg by mouth Every Night., Disp: , Rfl:   •  chlorthalidone (HYGROTON) 25 MG tablet, Take 25 mg by mouth Daily., Disp: , Rfl:   •  fluticasone-salmeterol (Advair Diskus) 500-50 MCG/DOSE DISKUS, Inhale 1 puff 2 (Two) Times a Day for 30 days. Rinse mouth out after each use, Disp: 1 each, Rfl: 11  •  furosemide (LASIX) 40 MG tablet, Take 40 mg by mouth. 1/2-1 tab po qd depending on weight, Disp: , Rfl:   •  ketotifen (ZADITOR) 0.025 % ophthalmic solution, instill 1 drop IN EACH EYE TWICE DAILY AS NEEDED (AT least 8 hours APART), Disp: , Rfl:   •  metoprolol tartrate (LOPRESSOR) 50 MG tablet, Take 50 mg by mouth Daily., Disp:  ", Rfl:   •  montelukast (SINGULAIR) 10 MG tablet, Take 10 mg by mouth Every Morning., Disp: , Rfl:   •  oxyCODONE-acetaminophen (PERCOCET) 5-325 MG per tablet, Take 1 tablet by mouth Every 8 (Eight) Hours As Needed., Disp: , Rfl:   •  potassium chloride (K-DUR,KLOR-CON) 10 MEQ ER tablet, Take 10 mEq by mouth. 1/2 tab po qd, Disp: , Rfl:   •  venlafaxine XR (EFFEXOR-XR) 75 MG 24 hr capsule, Take 75 mg by mouth Daily., Disp: , Rfl:   •  vitamin D3 125 MCG (5000 UT) capsule capsule, Take 1 capsule by mouth Daily., Disp: , Rfl:   •  nicotine (NICODERM CQ) 21 MG/24HR patch, Place 1 patch on the skin as directed by provider Daily., Disp: 30 patch, Rfl: 0  •  nicotine polacrilex (COMMIT) 2 MG lozenge, Dissolve 1 lozenge in the mouth As Needed for Smoking Cessation for up to 30 days., Disp: 120 lozenge, Rfl: 2     Objective     Physical Exam  Vital Signs Reviewed  WDWN, Alert, NAD.    HEENT:  PERRL, EOMI.  OP, nares clear, no sinus tenderness  Neck:  Supple, no JVD, no thyromegaly.  Lymph: no axillary, cervical, supraclavicular lymphadenopathy noted bilaterally  Chest: Mildly decreased breath sounds throughout. No wheezes, rales, or rhonchi appreciated.  Normal work of breathing noted.  Patient is able speak full sentences without difficulty.  CV: RRR, no MGR, pulses 2+, equal.  Abd:  Soft, NT, ND, + BS, no HSM  EXT:  no clubbing, no cyanosis, no edema, no joint tenderness  Neuro:  A&Ox3, CN grossly intact, no focal deficits.  Skin: No rashes or lesions noted.    Vital Signs:   /68 (BP Location: Left arm, Patient Position: Sitting, Cuff Size: Adult)   Pulse 81   Temp 98.6 °F (37 °C) (Infrared)   Resp 14   Ht 165.1 cm (65\")   Wt 109 kg (240 lb)   SpO2 96% Comment: room air  BMI 39.94 kg/m²         Result Review :   I have personally reviewed my last office visit note.         Assessment and Plan      Assessment:  1.  Asthma/COPD overlap syndrome.  2.  Obstructive sleep apnea, patient nightly CPAP.  3.  Diastolic " heart failure, patient is under the care of Dr. Rodriguez.  4.  COVID-19 pneumonia back in September, 2021.  5.  Dyspnea.  6.  Tobacco abuse of cigarettes ongoing.      Plan:  1.  Continue Advair every day as prescribed and rinse mouth out after each use.  2.  Continue albuterol inhaler and albuterol nebulizer treatments as needed.  3.  For obstructive sleep apnea, patient to continue CPAP with oxygen bled in at current settings at night and with naps and clean mask and tubing daily.  4.  Smoking status: patient is a current cigarette smoker.   Patient reports that they have been smoking cigarettes.  Patient started smoking about 35 years ago.  Patient has a 17.00 pack-year smoking history.  Patient has never used smokeless tobacco.  I have educated patient on the risk of diseases from using tobacco products such as cancer, COPD and heart disease. I advised patient to quit and patient is willing to quit. We have discussed the following method/s for tobacco cessation:  Education Material Counseling [place].  Nicotine patches and nicotine lozenges sent to the pharmacy today.  How to take medications and possible side effects of medications discussed with the patient.  Patient verbalized understanding and compliance.  Together we have set a quit date for 2 weeks from today.  Patient will follow up in 4 months or sooner to check on their progress. I spent 4 minutes counseling the patient.  5.  Continue Singulair.  6.  Patient with COVID-19 pneumonia back in September, 2021.  Patient still short of breath.  Will order a chest CT scan.  7.  Vaccination status: patient reports they are up-to-date with flu and received first Covid vaccine.  Patient is advised to check on the status of her pneumonia vaccine with her primary care provider and to notify our office.  Patient states she does not want to receive additional Covid vaccines as she was sick with the first Covid vaccine.  Discussed with patient the benefits of  vaccination including decreased risk of severe illness, hospitalization and death related to COVID-19.  Patient verbalized understanding and will consider getting vaccinated.  Patient is advised to continue to follow CDC recommendations such as social distancing, wearing a mask, and washing hands for least 20 seconds.  8.  I counseled the patient on diet and exercise. Patient's BMI and weight were discussed.  The patient was counseled on initiating and intensifying attempts to lose weight.  Patient was counseled about the risks of obesity and advised to decrease caloric intake by 500 calories a day, eat three small meals a day and advised to minimize snacking.  I recommended 30-60 minutes of daily exercise.  9.  Patient to call the office, 911, or go to the ER with new or worsening symptoms.  10.  Follow-up in 4 to 6 months, sooner if needed.          Follow Up   Return for 4-6 months.  Patient was given instructions and counseling regarding her condition or for health maintenance advice. Please see specific information pulled into the AVS if appropriate.

## 2022-03-14 NOTE — PATIENT INSTRUCTIONS
Chronic Obstructive Pulmonary Disease Exacerbation    Chronic obstructive pulmonary disease (COPD) is a long-term (chronic) condition that affects the lungs. COPD is a general term that can be used to describe many different lung problems that cause lung swelling (inflammation) and limit airflow, including chronic bronchitis and emphysema. COPD exacerbations are episodes when breathing symptoms become much worse and require extra treatment.  COPD exacerbations are usually caused by infections. Without treatment, COPD exacerbations can be severe and even life threatening. Frequent COPD exacerbations can cause further damage to the lungs.  What are the causes?  This condition may be caused by:  Respiratory infections, including viral and bacterial infections.  Exposure to smoke.  Exposure to air pollution, chemical fumes, or dust.  Things that give you an allergic reaction (allergens).  Not taking your usual COPD medicines as directed.  Underlying medical problems, such as congestive heart failure or infections not involving the lungs.  In many cases, the cause (trigger) of this condition is not known.  What increases the risk?  The following factors may make you more likely to develop this condition:  Smoking cigarettes.  Old age.  Frequent prior COPD exacerbations.  What are the signs or symptoms?  Symptoms of this condition include:  Increased coughing.  Increased production of mucus from your lungs (sputum).  Increased wheezing.  Increased shortness of breath.  Rapid or labored breathing.  Chest tightness.  Less energy than usual.  Sleep disruption from symptoms.  Confusion or increased sleepiness.  Often these symptoms happen or get worse even with the use of medicines.  How is this diagnosed?  This condition is diagnosed based on:  Your medical history.  A physical exam.  You may also have tests, including:  A chest X-ray.  Blood tests.  Lung (pulmonary) function tests.  How is this treated?  Treatment for this  condition depends on the severity and cause of the symptoms. You may need to be admitted to a hospital for treatment. Some of the treatments commonly used to treat COPD exacerbations are:  Antibiotic medicines. These may be used for severe exacerbations caused by a lung infection, such as pneumonia.  Bronchodilators. These are inhaled medicines that expand the air passages and allow increased airflow.  Steroid medicines. These act to reduce inflammation in the airways. They may be given with an inhaler, taken by mouth, or given through an IV tube inserted into one of your veins.  Supplemental oxygen therapy.  Airway clearing techniques, such as noninvasive ventilation (NIV) and positive expiratory pressure (PEP). These provide respiratory support through a mask or other noninvasive device. An example of this would be using a continuous positive airway pressure (CPAP) machine to improve delivery of oxygen into your lungs.  Follow these instructions at home:  Medicines  Take over-the-counter and prescription medicines only as told by your health care provider. It is important to use correct technique with inhaled medicines.  If you were prescribed an antibiotic medicine or oral steroid, take it as told by your health care provider. Do not stop taking the medicine even if you start to feel better.  Lifestyle  Eat a healthy diet.  Exercise regularly.  Get plenty of sleep.  Avoid exposure to all substances that irritate the airway, especially to tobacco smoke.  Wash your hands often with soap and water to reduce the risk of infection. If soap and water are not available, use hand .  During flu season, avoid enclosed spaces that are crowded with people.  General instructions  Drink enough fluid to keep your urine clear or pale yellow (unless you have a medical condition that requires fluid restriction).  Use a cool mist vaporizer. This humidifies the air and makes it easier for you to clear your chest when you  cough.  If you have a home nebulizer and oxygen, continue to use them as told by your health care provider.  Keep all follow-up visits as told by your health care provider. This is important.  How is this prevented?  Stay up-to-date on pneumococcal and influenza (flu) vaccines. A flu shot is recommended every year to help prevent exacerbations.  Do not use any products that contain nicotine or tobacco, such as cigarettes and e-cigarettes. Quitting smoking is very important in preventing COPD from getting worse and in preventing exacerbations from happening as often. If you need help quitting, ask your health care provider.  Follow all instructions for pulmonary rehabilitation after a recent exacerbation. This can help prevent future exacerbations.  Work with your health care provider to develop and follow an action plan. This tells you what steps to take when you experience certain symptoms.  Contact a health care provider if:  You have a worsening of your regular COPD symptoms.  Get help right away if:  You have worsening shortness of breath, even when resting.  You have trouble talking.  You have severe chest pain.  You cough up blood.  You have a fever.  You have weakness, vomit repeatedly, or faint.  You feel confused.  You are not able to sleep because of your symptoms.  You have trouble doing daily activities.  Summary  COPD exacerbations are episodes when breathing symptoms become much worse and require extra treatment above your normal treatment.  Exacerbations can be severe and even life threatening. Frequent COPD exacerbations can cause further damage to your lungs.  COPD exacerbations are usually triggered by infections such as the flu, colds, and even pneumonia.  Treatment for this condition depends on the severity and cause of the symptoms. You may need to be admitted to a hospital for treatment.  Quitting smoking is very important to prevent COPD from getting worse and to prevent exacerbations from  happening as often.  This information is not intended to replace advice given to you by your health care provider. Make sure you discuss any questions you have with your health care provider.  Document Revised: 11/30/2018 Document Reviewed: 01/22/2018  Elsevier Patient Education © 2021 [employer]  Smoking Tobacco Information, Adult  Smoking tobacco can be harmful to your health. Tobacco contains a poisonous (toxic), colorless chemical called nicotine. Nicotine is addictive. It changes the brain and can make it hard to stop smoking. Tobacco also has other toxic chemicals that can hurt your body and raise your risk of many cancers.  How can smoking tobacco affect me?  Smoking tobacco puts you at risk for:  Cancer. Smoking is most commonly associated with lung cancer, but can also lead to cancer in other parts of the body.  Chronic obstructive pulmonary disease (COPD). This is a long-term lung condition that makes it hard to breathe. It also gets worse over time.  High blood pressure (hypertension), heart disease, stroke, or heart attack.  Lung infections, such as pneumonia.  Cataracts. This is when the lenses in the eyes become clouded.  Digestive problems. This may include peptic ulcers, heartburn, and gastroesophageal reflux disease (GERD).  Oral health problems, such as gum disease and tooth loss.  Loss of taste and smell.  Smoking can affect your appearance by causing:  Wrinkles.  Yellow or stained teeth, fingers, and fingernails.  Smoking tobacco can also affect your social life, because:  It may be challenging to find places to smoke when away from home. Many workplaces, restaurants, hotels, and public places are tobacco-free.  Smoking is expensive. This is due to the cost of tobacco and the long-term costs of treating health problems from smoking.  Secondhand smoke may affect those around you. Secondhand smoke can cause lung cancer, breathing problems, and heart disease. Children of smokers have a higher  risk for:  Sudden infant death syndrome (SIDS).  Ear infections.  Lung infections.  If you currently smoke tobacco, quitting now can help you:  Lead a longer and healthier life.  Look, smell, breathe, and feel better over time.  Save money.  Protect others from the harms of secondhand smoke.  What actions can I take to prevent health problems?  Quit smoking    Do not start smoking. Quit if you already do.  Make a plan to quit smoking and commit to it. Look for programs to help you and ask your health care provider for recommendations and ideas.  Set a date and write down all the reasons you want to quit.  Let your friends and family know you are quitting so they can help and support you. Consider finding friends who also want to quit. It can be easier to quit with someone else, so that you can support each other.  Talk with your health care provider about using nicotine replacement medicines to help you quit, such as gum, lozenges, patches, sprays, or pills.  Do not replace cigarette smoking with electronic cigarettes, which are commonly called e-cigarettes. The safety of e-cigarettes is not known, and some may contain harmful chemicals.  If you try to quit but return to smoking, stay positive. It is common to slip up when you first quit, so take it one day at a time.  Be prepared for cravings. When you feel the urge to smoke, chew gum or suck on hard candy.    Lifestyle  Stay busy and take care of your body.  Drink enough fluid to keep your urine pale yellow.  Get plenty of exercise and eat a healthy diet. This can help prevent weight gain after quitting.  Monitor your eating habits. Quitting smoking can cause you to have a larger appetite than when you smoke.  Find ways to relax. Go out with friends or family to a movie or a restaurant where people do not smoke.  Ask your health care provider about having regular tests (screenings) to check for cancer. This may include blood tests, imaging tests, and other  tests.  Find ways to manage your stress, such as meditation, yoga, or exercise.  Where to find support  To get support to quit smoking, consider:  Asking your health care provider for more information and resources.  Taking classes to learn more about quitting smoking.  Looking for local organizations that offer resources about quitting smoking.  Joining a support group for people who want to quit smoking in your local community.  Calling the smokefree.gov counselor helpline: 1-800-Quit-Now (1-244.409.7343)  Where to find more information  You may find more information about quitting smoking from:  HelpGuide.org: www.helpguide.org  Smokefree.gov: smokefree.gov  American Lung Association: www.lung.org  Contact a health care provider if you:  Have problems breathing.  Notice that your lips, nose, or fingers turn blue.  Have chest pain.  Are coughing up blood.  Feel faint or you pass out.  Have other health changes that cause you to worry.  Summary  Smoking tobacco can negatively affect your health, the health of those around you, your finances, and your social life.  Do not start smoking. Quit if you already do. If you need help quitting, ask your health care provider.  Think about joining a support group for people who want to quit smoking in your local community. There are many effective programs that will help you to quit this behavior.  This information is not intended to replace advice given to you by your health care provider. Make sure you discuss any questions you have with your health care provider.  Document Revised: 09/11/2020 Document Reviewed: 01/02/2018  Elsevier Patient Education © 2021 [employer].

## 2022-03-21 ENCOUNTER — OFFICE VISIT (OUTPATIENT)
Dept: PULMONOLOGY | Facility: CLINIC | Age: 57
End: 2022-03-21

## 2022-03-21 ENCOUNTER — TELEPHONE (OUTPATIENT)
Dept: PULMONOLOGY | Facility: CLINIC | Age: 57
End: 2022-03-21

## 2022-03-21 VITALS
TEMPERATURE: 98.6 F | DIASTOLIC BLOOD PRESSURE: 68 MMHG | WEIGHT: 240 LBS | OXYGEN SATURATION: 96 % | HEART RATE: 81 BPM | RESPIRATION RATE: 14 BRPM | BODY MASS INDEX: 39.99 KG/M2 | SYSTOLIC BLOOD PRESSURE: 117 MMHG | HEIGHT: 65 IN

## 2022-03-21 DIAGNOSIS — J12.82 PNEUMONIA DUE TO COVID-19 VIRUS: ICD-10-CM

## 2022-03-21 DIAGNOSIS — I50.30 DIASTOLIC CONGESTIVE HEART FAILURE, UNSPECIFIED HF CHRONICITY: ICD-10-CM

## 2022-03-21 DIAGNOSIS — J44.9 ASTHMA-COPD OVERLAP SYNDROME: ICD-10-CM

## 2022-03-21 DIAGNOSIS — G47.33 OSA (OBSTRUCTIVE SLEEP APNEA): Primary | ICD-10-CM

## 2022-03-21 DIAGNOSIS — R06.00 DYSPNEA, UNSPECIFIED TYPE: ICD-10-CM

## 2022-03-21 DIAGNOSIS — Z72.0 TOBACCO ABUSE: ICD-10-CM

## 2022-03-21 DIAGNOSIS — U07.1 PNEUMONIA DUE TO COVID-19 VIRUS: ICD-10-CM

## 2022-03-21 PROBLEM — J44.89 ASTHMA-COPD OVERLAP SYNDROME: Status: ACTIVE | Noted: 2017-01-13

## 2022-03-21 PROCEDURE — 99214 OFFICE O/P EST MOD 30 MIN: CPT | Performed by: NURSE PRACTITIONER

## 2022-03-21 PROCEDURE — 99406 BEHAV CHNG SMOKING 3-10 MIN: CPT | Performed by: NURSE PRACTITIONER

## 2022-03-21 RX ORDER — ALBUTEROL SULFATE 2.5 MG/3ML
2.5 SOLUTION RESPIRATORY (INHALATION) EVERY 4 HOURS PRN
Qty: 180 EACH | Refills: 11 | Status: SHIPPED | OUTPATIENT
Start: 2022-03-21 | End: 2022-04-20

## 2022-03-21 RX ORDER — ALBUTEROL SULFATE 90 UG/1
2 AEROSOL, METERED RESPIRATORY (INHALATION) EVERY 4 HOURS PRN
Qty: 18 G | Refills: 11 | Status: SHIPPED | OUTPATIENT
Start: 2022-03-21 | End: 2022-04-20

## 2022-03-21 RX ORDER — NICOTINE 21 MG/24HR
1 PATCH, TRANSDERMAL 24 HOURS TRANSDERMAL EVERY 24 HOURS
Qty: 30 PATCH | Refills: 0 | Status: SHIPPED | OUTPATIENT
Start: 2022-03-21 | End: 2022-06-10 | Stop reason: ALTCHOICE

## 2022-03-21 RX ORDER — POLYETHYLENE GLYCOL 3350 17 G
2 POWDER IN PACKET (EA) ORAL AS NEEDED
Qty: 120 LOZENGE | Refills: 2 | Status: SHIPPED | OUTPATIENT
Start: 2022-03-21 | End: 2022-04-20

## 2022-03-21 RX ORDER — ALBUTEROL SULFATE 90 UG/1
2 AEROSOL, METERED RESPIRATORY (INHALATION) EVERY 4 HOURS PRN
COMMUNITY
End: 2022-03-21 | Stop reason: SDUPTHER

## 2022-03-21 NOTE — TELEPHONE ENCOUNTER
Mely [facility] is calling with questions regarding the patients albuterol sulfate  (90 Base) MCG/ACT inhaler. How it needs to be dispensed as ml's or as vials. Please contact Mely at [phone], thank you.

## 2022-04-04 ENCOUNTER — HOSPITAL ENCOUNTER (OUTPATIENT)
Dept: CT IMAGING | Facility: HOSPITAL | Age: 57
Discharge: HOME | End: 2022-04-04
Admitting: NURSE PRACTITIONER

## 2022-04-04 DIAGNOSIS — U07.1 PNEUMONIA DUE TO COVID-19 VIRUS: ICD-10-CM

## 2022-04-04 DIAGNOSIS — R91.8 LUNG NODULES: Primary | ICD-10-CM

## 2022-04-04 DIAGNOSIS — Z72.0 TOBACCO ABUSE: ICD-10-CM

## 2022-04-04 DIAGNOSIS — J44.9 ASTHMA-COPD OVERLAP SYNDROME: ICD-10-CM

## 2022-04-04 DIAGNOSIS — I50.30 DIASTOLIC CONGESTIVE HEART FAILURE, UNSPECIFIED HF CHRONICITY: ICD-10-CM

## 2022-04-04 DIAGNOSIS — J12.82 PNEUMONIA DUE TO COVID-19 VIRUS: ICD-10-CM

## 2022-04-04 DIAGNOSIS — R06.00 DYSPNEA, UNSPECIFIED TYPE: ICD-10-CM

## 2022-04-04 PROCEDURE — 71250 CT THORAX DX C-: CPT

## 2022-04-25 NOTE — PROGRESS NOTES
[facility]   Hospitalist Progress Note  Date: 2021  Patient Name: Dang Hunt  : 1965  MRN: [id number]  Date of admission: 2021      Subjective   Subjective     Chief Complaint: Shortness of air    Summary:   Dang Hunt is a 56 y.o. female resents with complaints of shortness of breath.  She states she has been sick for approximately 10 days has had a temperature of 103.  Patient has had nausea lower abdominal pain dyspnea and diarrhea.  She tested positive for Covid on .  She presents to the ED today and was found to be hypoxic with O2 sats between 89 to 96%.  Patient has not been vaccinated for COVID-19.  Patient uses CPAP at night at home not aware of the pressure.  Due to worsening hypoxemia needing 9 L of high flow nasal cannula pulmonary was consulted to consider baricitinib.      Interval Followup:   Patient down to 4 L high flow nasal cannula to keep sats more than 90%.  At times she needs up to 6 L.  No home oxygen use.  At night uses 2 L of oxygen bled into CPAP  Other vital signs stable.  Shortness of air is better  Does have cough with dark mucus.  No more blood-tinged mucus.   have loss of taste and smell has improved   appetite is down.  No more diarrhea or abdominal cramps  Sore throat better  Headache is better    Review of Systems   All systems were reviewed and negative except for: Summary and interval follow-up    Objective   Objective     Vitals:   Temp:  [97.4 °F (36.3 °C)-98.3 °F (36.8 °C)] 98.3 °F (36.8 °C)  Heart Rate:  [63-81] 72  Resp:  [16-18] 16  BP: (130-150)/(81-92) 146/81  Flow (L/min):  [3-7] 3  Physical Exam    Constitutional: Awake, alert, no acute distress   Eyes: Pupils equal, sclerae anicteric, no conjunctival injection   HENT: NCAT, mucous membranes moist   Neck: Supple, no thyromegaly, no lymphadenopathy, trachea midline   Respiratory: Rhonchi and decreased breath sounds bilaterally, nonlabored respirations    Cardiovascular: RRR, no murmurs,  appropriate. ROS:  Review of Systems   Constitutional: Negative for fever. HENT: Positive for postnasal drip and rhinorrhea. Respiratory: Positive for cough and shortness of breath (w/ exertion). Gastrointestinal: Positive for constipation. Musculoskeletal: Positive for arthralgias and gait problem. Hemoglobin A1C (%)   Date Value   2022 5.9     Microscopic Examination (no units)   Date Value   2020 YES     LDL Calculated (mg/dL)   Date Value   2020 79       Past Medical History:   Diagnosis Date    Asthma     [facility] disease[facility])     Hyperlipidemia     Hypertension     RA [facility]rheumatoid arthritis) ([facility] [address]     Thyroid disease     has half of her thyroid       Family History   Problem Relation Age of Onset    High Blood Pressure Mother        Social History     Socioeconomic History    Marital status:      Spouse name: Not on file    Number of children: Not on file    Years of education: Not on file    Highest education level: Not on file   Occupational History    Not on file   Tobacco Use    Smoking status: Former Smoker     Packs/day: 0.50     Years: 20.00     Pack years: 10.00     Types: Cigarettes     Start date: 1959     Quit date: 1987     Years since quittin.3    Smokeless tobacco: Never Used   Vaping Use    Vaping Use: Never used   Substance and Sexual Activity    Alcohol use: No     Alcohol/week: 0.0 standard drinks    Drug use: No    Sexual activity: Not Currently     Partners: Male   Other Topics Concern    Not on file   Social History Narrative    Not on file     Social Determinants of Health     Financial Resource Strain:     Difficulty of Paying Living Expenses: Not on file   Food Insecurity:     Worried About Running Out of Food in the Last Year: Not on file    Dona of Food in the Last Year: Not on file   Transportation Needs:     Lack of Transportation (Medical):  Not on file    Lack of rubs, or gallops, palpable pedal pulses bilaterally   Gastrointestinal: Positive bowel sounds, soft, nontender, nondistended   Musculoskeletal: No bilateral ankle edema, no clubbing or cyanosis to extremities   Psychiatric: Appropriate affect, cooperative   Neurologic: Oriented x 3, strength symmetric in all extremities, Cranial Nerves grossly intact to confrontation, speech clear   Skin: No rashes     Result Review    Result Review:  I have personally reviewed the results from the time of this admission to 8/12/2021 17:18 EDT and agree with these findings:  [x]  Laboratory  [x]  Microbiology  [x]  Radiology  [x]  EKG/Telemetry   []  Cardiology/Vascular   []  Pathology  [x]  Old records  [x]  Other: Medications  Assessment/Plan   Assessment / Plan     Assessment:  Acute hypoxic respiratory failure.  Patient does not use continuous oxygen at home.  COVID-19 pneumonia causing above.  Patient not vaccinated.  Likely community-acquired pneumonia.  NIDDM.  Hemoglobin A1c of 5.8%  Morbid obesity BMI 40.6.  Obstructive sleep apnea on home CPAP with nocturnal oxygen.  History of COPD.  Hypertension.  Hyperlipidemia.     Plan:  Repeat chest x-ray worsening infiltrate noted  No Actemra was available due to shortage last week  Appreciate pulmonary input.  Started on baricitinib day 3/14  Continue supplemental oxygen keep sats more than 92%.  Prone ventilation encouraged.  Finished 5 days of IV remdesivir August 10  Continue IV Decadron.  10 mg twice a day  Rocephin and Zithromax.  Nebulizer treatment with DuoNeb, Pulmicort and Brovana neb.  Bronchodilator and bronchopulmonary hygiene protocol.  Tussionex.  Sliding scale insulin.  IV Lasix .  Chloraseptic and Cepacol for sore throat  As needed Toradol for the headache.  Resumed home medication including Effexor.  Await culture data.  Negative to date  MRSA DNA PCR, Legionella and strep pneumo antigen negative.   Inflammatory markers improving continue to trend.  D-dimer up ,  ESR negative.  CRP and ferritin trending down  Procalcitonin  negative.  Lactic acidosis resolved  Pepcid.  DVT prophylaxis with Lovenox.  Dose adjusted per pharmacy for weight.    Discussed plan with RN and .  Hopefully discharge home when when she is maintaining adequate oxygen saturation on 4 L of nasal cannula.  Likely next couple of days    DVT prophylaxis:  Medical DVT prophylaxis orders are present.    CODE STATUS:   Level Of Support Discussed With: Patient  Code Status: CPR  Medical Interventions (Level of Support Prior to Arrest): Full      Part of this note may be an electronic transcription/translation of spoken language to printed text using the Dragon Dictation System.         Electronically signed by Markell Triana MD, 08/12/21, 5:22 PM EDT.          .                Transportation (Non-Medical): Not on file   Physical Activity:     Days of Exercise per Week: Not on file    Minutes of Exercise per Session: Not on file   Stress:     Feeling of Stress : Not on file   Social Connections:     Frequency of Communication with Friends and Family: Not on file    Frequency of Social Gatherings with Friends and Family: Not on file    Attends [employer] Services: Not on file    Active Member of [address] [place] [address] or Organizations: Not on file    Attends Club or Organization Meetings: Not on file    Marital Status: Not on file   Intimate Partner Violence:     Fear of Current or Ex-Partner: Not on file    Emotionally Abused: Not on file    Physically Abused: Not on file    Sexually Abused: Not on file   Housing Stability:     Unable to Pay for Housing in the Last Year: Not on file    Number of [place] in the Last Year: Not on file    Unstable Housing in the Last Year: Not on file       Prior to Visit Medications    Medication Sig Taking?  Authorizing Provider   METHOTREXATE PO Take 10 mg by mouth once a week Yes Historical Provider, MD   fluticasone (FLONASE) 50 MCG/ACT nasal spray 2 sprays by Nasal route daily Yes Natividad Goode MD   fluticasone-salmeterol (Leo Clink) 250-50 MCG/DOSE AEPB USE 1 INHALATION BY MOUTH  EVERY 12 HOURS Yes Maria Del Rosario Cruz MD   folic acid (FOLVITE) 1 MG tablet Take 1 mg by mouth daily Yes Historical Provider, MD   PROAIR  (90 Base) MCG/ACT inhaler INHALE 2 PUFFS BY MOUTH  INTO THE LUNGS EVERY 6  HOURS AS NEEDED FOR  WHEEZING OR SHORTNESS OF  BREATH Yes Adams Knutson MD   DULoxetine (CYMBALTA) 60 MG extended release capsule Take 120 mg by mouth nightly Before bed  Yes Historical Provider, MD   RESTASIS 0.05 % ophthalmic emulsion  Yes Historical Provider, MD   atorvastatin (LIPITOR) 80 MG tablet Take 80 mg by mouth every evening  Yes Historical Provider, MD   triamterene-hydrochlorothiazide (MAXZIDE-25) 37.5-25 MG per tablet Take 1 tablet by mouth daily Yes Historical Provider, MD   Cholecalciferol (VITAMIN D) 2000 UNITS CAPS capsule Take 1 capsule by mouth nightly  Yes Historical Provider, MD   levothyroxine (SYNTHROID) 25 MCG tablet Take 25 mcg by mouth Daily. Yes Historical Provider, MD   atenolol (TENORMIN) 25 MG tablet Take 0.5 tablets by mouth daily  Emily Hoang MD       Allergies   Allergen Reactions    Effexor [Venlafaxine Hcl] Itching and Nausea Only    Fenofibrate Diarrhea    Prednisone Other (See Comments)     Causes body to feel on fire    Codeine Nausea And Vomiting    Docosahexaenoic Acid-Epa Nausea And Vomiting     Cannot take Vascepa       OBJECTIVE:    /76   Pulse 62   Ht 5' 5\" (1.651 m)   Wt 170 lb (77.1 kg)   SpO2 96%   BMI 28.29 kg/m²     BP Readings from Last 2 Encounters:   04/25/22 114/76   01/31/22 (!) 151/82       Wt Readings from Last 3 Encounters:   04/25/22 170 lb (77.1 kg)   01/27/22 180 lb (81.6 kg)   01/18/22 180 lb (81.6 kg)       Physical Exam  Constitutional:       Appearance: She is well-developed. HENT:      Head: Normocephalic and atraumatic. Right Ear: Hearing normal.      Left Ear: Hearing normal.   Eyes:      Conjunctiva/sclera: Conjunctivae normal.   Neck:      Trachea: No tracheal deviation. Cardiovascular:      Rate and Rhythm: Normal rate and regular rhythm. Pulmonary:      Effort: Pulmonary effort is normal.      Breath sounds: Decreased breath sounds and rhonchi present. Abdominal:      Palpations: Abdomen is soft. Tenderness: There is no abdominal tenderness. Skin:     General: Skin is warm and dry. Neurological:      Mental Status: She is alert and oriented to person, place, and time. Psychiatric:         Mood and Affect: Mood normal.         Behavior: Behavior normal.     use of rolling walker. ASSESSMENT/PLAN:    1. Chronic obstructive pulmonary disease, unspecified COPD type ([address]  ? Mild exacerbation. Cont meds. Has inhalers and steroid.   rec add flonase to help w/ drainage. Call if sxs worsen, as may need abx. O/w f/u w/ pulm as sched. 2. Rheumatoid arthritis involving shoulder with positive rheumatoid factor, unspecified laterality ([facility] [address]  Ongoing issues w/ RA.  hang meds. [facility] and [facility]cf placement. Would prefer to return to [facility]. Pt to call for f/u appt    3. S/P fusion of thoracic spine  Overall improved sxs. Cont to work on activity. 4. Benign essential HTN  The current medical regimen is effective;  continue present plan and medications. 5. Mixed hyperlipidemia  Rpt labs  - Lipid Panel    6. Abnormal glucose  Rpt labs  - Hemoglobin A1C    7. Hypothyroidism, unspecified type  Rpt labs. Still sig fatigue.    - T4, Free  - TSH    8. Other fatigue  Rpt labs. Prior low platelets, but normal on last few checks. - Comprehensive Metabolic Panel  - CBC with Auto Differential  - Vitamin B12    9. Post-nasal drainage  See above.     - fluticasone (FLONASE) 50 MCG/ACT nasal spray; 2 sprays by Nasal route daily  Dispense: 16 g; Refill: 3

## 2022-06-10 ENCOUNTER — OFFICE VISIT (OUTPATIENT)
Dept: CARDIOLOGY | Facility: CLINIC | Age: 57
End: 2022-06-10

## 2022-06-10 ENCOUNTER — LAB (OUTPATIENT)
Dept: LAB | Facility: HOSPITAL | Age: 57
End: 2022-06-10

## 2022-06-10 VITALS
HEART RATE: 72 BPM | WEIGHT: 252 LBS | BODY MASS INDEX: 41.99 KG/M2 | HEIGHT: 65 IN | SYSTOLIC BLOOD PRESSURE: 134 MMHG | DIASTOLIC BLOOD PRESSURE: 80 MMHG

## 2022-06-10 DIAGNOSIS — R55 SYNCOPE AND COLLAPSE: ICD-10-CM

## 2022-06-10 DIAGNOSIS — R42 DIZZINESS: ICD-10-CM

## 2022-06-10 DIAGNOSIS — R00.2 PALPITATIONS: ICD-10-CM

## 2022-06-10 DIAGNOSIS — R06.09 DOE (DYSPNEA ON EXERTION): ICD-10-CM

## 2022-06-10 DIAGNOSIS — R00.2 PALPITATIONS: Primary | ICD-10-CM

## 2022-06-10 PROBLEM — J12.82 PNEUMONIA DUE TO COVID-19 VIRUS: Status: RESOLVED | Noted: 2021-08-10 | Resolved: 2022-06-10

## 2022-06-10 PROBLEM — K63.5 COLON POLYP: Status: ACTIVE | Noted: 2022-06-10

## 2022-06-10 PROBLEM — I50.32 CHRONIC DIASTOLIC CHF (CONGESTIVE HEART FAILURE): Status: ACTIVE | Noted: 2022-06-10

## 2022-06-10 PROBLEM — F41.9 ANXIETY: Status: ACTIVE | Noted: 2022-06-10

## 2022-06-10 PROBLEM — U07.1 PNEUMONIA DUE TO COVID-19 VIRUS: Status: RESOLVED | Noted: 2021-08-10 | Resolved: 2022-06-10

## 2022-06-10 LAB
ANION GAP SERPL CALCULATED.3IONS-SCNC: 13.7 MMOL/L (ref 5–15)
BUN SERPL-MCNC: 17 MG/DL (ref 6–20)
BUN/CREAT SERPL: 15.9 (ref 7–25)
CALCIUM SPEC-SCNC: 10.1 MG/DL (ref 8.6–10.5)
CHLORIDE SERPL-SCNC: 99 MMOL/L (ref 98–107)
CO2 SERPL-SCNC: 27.3 MMOL/L (ref 22–29)
CREAT SERPL-MCNC: 1.07 MG/DL (ref 0.57–1)
EGFRCR SERPLBLD CKD-EPI 2021: 61.1 ML/MIN/1.73
GLUCOSE SERPL-MCNC: 75 MG/DL (ref 65–99)
MAGNESIUM SERPL-MCNC: 2.2 MG/DL (ref 1.6–2.6)
NT-PROBNP SERPL-MCNC: 60.7 PG/ML (ref 0–900)
POTASSIUM SERPL-SCNC: 3.2 MMOL/L (ref 3.5–5.2)
SODIUM SERPL-SCNC: 140 MMOL/L (ref 136–145)

## 2022-06-10 PROCEDURE — 36415 COLL VENOUS BLD VENIPUNCTURE: CPT

## 2022-06-10 PROCEDURE — 83735 ASSAY OF MAGNESIUM: CPT

## 2022-06-10 PROCEDURE — 83880 ASSAY OF NATRIURETIC PEPTIDE: CPT

## 2022-06-10 PROCEDURE — 93000 ELECTROCARDIOGRAM COMPLETE: CPT | Performed by: NURSE PRACTITIONER

## 2022-06-10 PROCEDURE — 99214 OFFICE O/P EST MOD 30 MIN: CPT | Performed by: NURSE PRACTITIONER

## 2022-06-10 PROCEDURE — 80048 BASIC METABOLIC PNL TOTAL CA: CPT

## 2022-06-10 NOTE — PROGRESS NOTES
"Chief Complaint  Hypertension, Congestive Heart Failure, Palpitations, Dizziness, and Shortness of Breath    Subjective            History of Present Illness  Dang Hunt is a 57-year-old white/ female patient who presents to the office today for complaint of increased shortness of breath, palpitations, and dizziness.  She has diastolic CHF, hypertension, and hyperlipidemia.  She reports last Friday had trouble breathing, [place]. She reports she \"had to be pulled out\". She did inhalers without relief. Chest heaviness lasted for 3 days afterwards, she did go home to rest, [place] symptoms. She describes the pain as sharp and left sided in nature. The chest pain and shortness of breath have resolved at this point, she is unsure if her heart or her lungs were the cause of her symptoms. She did not seek medical treatment through urgent care or ER at that time. She also reports episode of passing out while bearing down on toilet a couple months ago. She did seek out treatment from PCP at that time but nothing was done.     PMH  Past Medical History:   Diagnosis Date   • Arthritis    • Asthma    • CHF (congestive heart failure) ([facility])    • Chronic diastolic CHF (congestive heart failure) ([facility]) 6/10/2022   • Colitis    • COPD (chronic obstructive pulmonary disease) ([facility])    • Depression    • Dysphagia    • Edema    • Fatigue    • GERD (gastroesophageal reflux disease)    • History of acute pancreatitis    • History of histoplasmosis    • HTN (hypertension)    • Hyperlipidemia    • Light headed    • Migraines    • Multiple joint pain    • On home oxygen therapy     2 L AT NIGHT   • MANI (obstructive sleep apnea)     WEARS CPAP   • RLS (restless legs syndrome)          ALLERGY  No Known Allergies       SURGICALHX  Past Surgical History:   Procedure Laterality Date   • BACK SURGERY     • BLADDER SUSPENSION     • BREAST BIOPSY Left    • " CARDIAC CATHETERIZATION     • COLONOSCOPY     • GASTRIC SLEEVE LAPAROSCOPIC N/A 05/15/2017    Procedure: GASTRIC SLEEVE LAPAROSCOPIC;  Surgeon: Edenilson Hannon Jr., MD;  Location[facility];  Service:    • HYSTERECTOMY     • LAPAROSCOPIC CHOLECYSTECTOMY     • LUNG BIOPSY     • NECK SURGERY     • OOPHORECTOMY            SOC  Social History     Socioeconomic History   • Marital status:    • Number of children: 2   Tobacco Use   • Smoking status: Light Tobacco Smoker     Packs/day: 0.50     Years: 35.00     Pack years: 17.50     Types: Cigarettes     Start date: 1/1/1985   • Smokeless tobacco: Never Used   Vaping Use   • Vaping Use: Never used   Substance and Sexual Activity   • Alcohol use: No   • Drug use: No   • Sexual activity: Yes     Partners: Male     Birth control/protection: Surgical         FAMHX  Family History   Problem Relation Age of Onset   • Obesity Mother    • Hypertension Mother    • COPD Mother    • Obesity Sister    • Hypertension Sister    • Obesity Maternal Grandmother    • Hypertension Maternal Grandmother    • Stroke Maternal Grandmother    • Heart attack Maternal Grandmother    • Arrhythmia Father    • Hypertension Father           MEDSIGONLY  Current Outpatient Medications on File Prior to Visit   Medication Sig   • atorvastatin (LIPITOR) 40 MG tablet Take 40 mg by mouth Every Night.   • chlorthalidone (HYGROTON) 25 MG tablet Take 25 mg by mouth Daily.   • furosemide (LASIX) 40 MG tablet Take 40 mg by mouth. 1/2-1 tab po qd depending on weight   • ketotifen (ZADITOR) 0.025 % ophthalmic solution instill 1 drop IN EACH EYE TWICE DAILY AS NEEDED (AT least 8 hours APART)   • metoprolol tartrate (LOPRESSOR) 50 MG tablet Take 50 mg by mouth Daily.   • montelukast (SINGULAIR) 10 MG tablet Take 10 mg by mouth Every Morning.   • oxyCODONE-acetaminophen (PERCOCET) 5-325 MG per tablet Take 1 tablet by mouth Every 8 (Eight) Hours As Needed.   • potassium chloride (K-DUR,KLOR-CON) 10 MEQ ER  "tablet Take 10 mEq by mouth. 1/2 tab po qd   • venlafaxine XR (EFFEXOR-XR) 75 MG 24 hr capsule Take 75 mg by mouth Daily.   • vitamin D3 125 MCG (5000 UT) capsule capsule Take 1 capsule by mouth Daily.   • fluticasone-salmeterol (Advair Diskus) 500-50 MCG/DOSE DISKUS Inhale 1 puff 2 (Two) Times a Day for 30 days. Rinse mouth out after each use   • [DISCONTINUED] nicotine (NICODERM CQ) 21 MG/24HR patch Place 1 patch on the skin as directed by provider Daily.     No current facility-administered medications on file prior to visit.       Objective   /80   Pulse 72   Ht 165.1 cm (65\")   Wt 114 kg (252 lb)   BMI 41.93 kg/m²     Physical Exam  Constitutional:       Appearance: She is obese.   HENT:      Head: Normocephalic.   Neck:      Vascular: No carotid bruit.   Cardiovascular:      Rate and Rhythm: Normal rate and regular rhythm.      Pulses: Normal pulses.      Heart sounds: Normal heart sounds. No murmur heard.  Pulmonary:      Effort: Pulmonary effort is normal.      Breath sounds: Normal breath sounds.   Musculoskeletal:      Cervical back: Neck supple.      Right lower leg: No edema.      Left lower leg: No edema.   Skin:     General: Skin is dry.      Capillary Refill: Capillary refill takes less than 2 seconds.   Neurological:      Mental Status: She is alert and oriented to person, place, and time.   Psychiatric:         Behavior: Behavior normal.       ECG 12 Lead    Date/Time: 6/10/2022 1:09 PM  Performed by: Lynda Engle APRN  Authorized by: Edenilson Rodriguez MD   Comparison: compared with previous ECG   Similar to previous ECG  Rhythm: sinus rhythm  Rate: normal  BPM: 71  Conduction: conduction normal  ST Segments: ST segments normal  T Waves: T waves normal  QRS axis: normal  Other: no other findings    Clinical impression: normal ECG          Result Review :   The following data was reviewed by: ROSHNI Sandoval on 06/10/2022:  proBNP   Date Value Ref Range Status   01/20/2022 29.7 " 0.0 - 900.0 pg/mL Final     CMP    CMP 8/13/21   Glucose 164 (A)   BUN 39 (A)   Creatinine 1.01 (A)   eGFR Non African Am 57 (A)   Sodium 137   Potassium 4.5   Chloride 100   Calcium 8.7   Albumin 3.30 (A)   Total Bilirubin 0.4   Alkaline Phosphatase 113   AST (SGOT) 11   ALT (SGPT) 15   (A) Abnormal value            CBC w/diff    CBC w/Diff 8/13/21   WBC 9.79   RBC 5.16   Hemoglobin 14.3   Hematocrit 44.7   MCV 86.6   MCH 27.7   MCHC 32.0   RDW 14.9   Platelets 317   (A) Abnormal value             Lab Results   Component Value Date    TSH 2.140 01/13/2017      No results found for: FREET4   No results found for: DDIMERQUANT  Magnesium   Date Value Ref Range Status   08/11/2021 2.0 1.6 - 2.6 mg/dL Final      No results found for: DIGOXIN   No results found for: TROPONINT        Lipid Panel    Lipid Panel 1/20/22   Total Cholesterol 176   Triglycerides 293 (A)   HDL Cholesterol 30 (A)   VLDL Cholesterol 50 (A)   LDL Cholesterol  96   LDL/HDL Ratio 2.91   (A) Abnormal value            Results for orders placed in visit on 01/19/22    Adult Transthoracic Echo Complete W/ Cont if Necessary Per Protocol    Interpretation Summary  · Calculated left ventricular EF = 62% Estimated left ventricular EF was in agreement with the calculated left ventricular EF.  · Estimated right ventricular systolic pressure from tricuspid regurgitation is normal (<35 mmHg).       Assessment and Plan    Diagnoses and all orders for this visit:    1. Palpitations (Primary)  Increased palpitations off and on since episode last week.  She states that she notices them more with increased activity.  Obtain BMP and magnesium levels to assess for electrolyte imbalance.  -     Basic Metabolic Panel; Future  -     Magnesium; Future    2. Dizziness & 3. Syncope and collapse  Syncopal episode a couple months ago plus this dizziness last week, obtain 14-day Holter monitor to assess for arrhythmia or pause  -     Holter Monitor - 72 Hour Up To 15 Days;  Future    4. OBRIEN (dyspnea on exertion)  She has diastolic CHF, previous echocardiogram was stable, obtain BNP to assess CHF.  -     proBNP; Future    Other orders  -     ECG 12 Lead        Follow Up   Return in about 6 months (around 12/10/2022) for Follow up with Dr Rodriguez.    Patient was given instructions and counseling regarding her condition or for health maintenance advice. Please see specific information pulled into the AVS if appropriate.     Dang Hunt  reports that she has been smoking cigarettes. She started smoking about 37 years ago. She has a 17.50 pack-year smoking history. She has never used smokeless tobacco.. I have educated her on the risk of diseases from using tobacco products such as cancer, COPD and heart disease.     I advised her to quit and she is not willing to quit.    I spent 4 minutes counseling the patient.           ROSHNI Sandoval  06/10/22  11:09 EDT    Dictated Utilizing Dragon Dictation

## 2022-06-15 ENCOUNTER — TELEPHONE (OUTPATIENT)
Dept: CARDIOLOGY | Facility: CLINIC | Age: 57
End: 2022-06-15

## 2022-06-15 NOTE — TELEPHONE ENCOUNTER
----- Message from ROSHNI Whitt sent at 6/12/2022  4:00 PM EDT -----  Potassium is low, increase from half tablet to whole tablet daily. Recheck BMP in 1 week  Magnesium level and BNP level are in normal limits

## 2022-08-11 DIAGNOSIS — I10 ESSENTIAL HYPERTENSION: Primary | ICD-10-CM

## 2022-09-20 PROBLEM — E78.2 MIXED HYPERLIPIDEMIA: Status: ACTIVE | Noted: 2017-01-13

## 2022-10-17 PROCEDURE — 87086 URINE CULTURE/COLONY COUNT: CPT | Performed by: NURSE PRACTITIONER

## 2022-10-19 ENCOUNTER — TELEPHONE (OUTPATIENT)
Dept: URGENT CARE | Facility: CLINIC | Age: 57
End: 2022-10-19

## 2022-10-19 NOTE — TELEPHONE ENCOUNTER
Called patient, results reviewed, patient states that her symptoms are improved, continue Macrobid as prescribed and follow-up with PCP as needed or symptoms worsen or persist.  Patient verbalizes understanding.

## 2022-12-01 ENCOUNTER — OFFICE VISIT (OUTPATIENT)
Dept: FAMILY MEDICINE CLINIC | Facility: CLINIC | Age: 57
End: 2022-12-01

## 2022-12-01 VITALS
OXYGEN SATURATION: 95 % | TEMPERATURE: 97.3 F | DIASTOLIC BLOOD PRESSURE: 76 MMHG | WEIGHT: 257.7 LBS | BODY MASS INDEX: 43.99 KG/M2 | HEART RATE: 65 BPM | HEIGHT: 64 IN | SYSTOLIC BLOOD PRESSURE: 130 MMHG

## 2022-12-01 DIAGNOSIS — R51.9 HEADACHE, WORSENING: Primary | ICD-10-CM

## 2022-12-01 DIAGNOSIS — R73.03 BORDERLINE DIABETES: ICD-10-CM

## 2022-12-01 DIAGNOSIS — R23.2 HOT FLASHES: ICD-10-CM

## 2022-12-01 DIAGNOSIS — G43.911 INTRACTABLE MIGRAINE WITH STATUS MIGRAINOSUS, UNSPECIFIED MIGRAINE TYPE: ICD-10-CM

## 2022-12-01 DIAGNOSIS — J34.9 PARANASAL SINUS DISEASE: ICD-10-CM

## 2022-12-01 DIAGNOSIS — E55.9 VITAMIN D DEFICIENCY: ICD-10-CM

## 2022-12-01 DIAGNOSIS — E78.2 MIXED HYPERLIPIDEMIA: ICD-10-CM

## 2022-12-01 DIAGNOSIS — R42 DIZZINESS: ICD-10-CM

## 2022-12-01 DIAGNOSIS — I10 ESSENTIAL HYPERTENSION: ICD-10-CM

## 2022-12-01 PROBLEM — M54.16 LUMBAR RADICULOPATHY: Status: ACTIVE | Noted: 2022-12-01

## 2022-12-01 LAB
BASOPHILS # BLD AUTO: 0.08 10*3/MM3 (ref 0–0.2)
BASOPHILS NFR BLD AUTO: 1 % (ref 0–1.5)
DEPRECATED RDW RBC AUTO: 45.3 FL (ref 37–54)
EOSINOPHIL # BLD AUTO: 0.29 10*3/MM3 (ref 0–0.4)
EOSINOPHIL NFR BLD AUTO: 3.5 % (ref 0.3–6.2)
ERYTHROCYTE [DISTWIDTH] IN BLOOD BY AUTOMATED COUNT: 14 % (ref 12.3–15.4)
HBA1C MFR BLD: 6.8 % (ref 4.8–5.6)
HCT VFR BLD AUTO: 41 % (ref 34–46.6)
HGB BLD-MCNC: 13.2 G/DL (ref 12–15.9)
IMM GRANULOCYTES # BLD AUTO: 0.02 10*3/MM3 (ref 0–0.05)
IMM GRANULOCYTES NFR BLD AUTO: 0.2 % (ref 0–0.5)
LYMPHOCYTES # BLD AUTO: 3.12 10*3/MM3 (ref 0.7–3.1)
LYMPHOCYTES NFR BLD AUTO: 37.3 % (ref 19.6–45.3)
MCH RBC QN AUTO: 28.4 PG (ref 26.6–33)
MCHC RBC AUTO-ENTMCNC: 32.2 G/DL (ref 31.5–35.7)
MCV RBC AUTO: 88.2 FL (ref 79–97)
MONOCYTES # BLD AUTO: 0.47 10*3/MM3 (ref 0.1–0.9)
MONOCYTES NFR BLD AUTO: 5.6 % (ref 5–12)
NEUTROPHILS NFR BLD AUTO: 4.39 10*3/MM3 (ref 1.7–7)
NEUTROPHILS NFR BLD AUTO: 52.4 % (ref 42.7–76)
NRBC BLD AUTO-RTO: 0 /100 WBC (ref 0–0.2)
PLATELET # BLD AUTO: 260 10*3/MM3 (ref 140–450)
PMV BLD AUTO: 11.1 FL (ref 6–12)
RBC # BLD AUTO: 4.65 10*6/MM3 (ref 3.77–5.28)
WBC NRBC COR # BLD: 8.37 10*3/MM3 (ref 3.4–10.8)

## 2022-12-01 PROCEDURE — 80053 COMPREHEN METABOLIC PANEL: CPT | Performed by: NURSE PRACTITIONER

## 2022-12-01 PROCEDURE — 82306 VITAMIN D 25 HYDROXY: CPT | Performed by: NURSE PRACTITIONER

## 2022-12-01 PROCEDURE — 85025 COMPLETE CBC W/AUTO DIFF WBC: CPT | Performed by: NURSE PRACTITIONER

## 2022-12-01 PROCEDURE — 83735 ASSAY OF MAGNESIUM: CPT | Performed by: NURSE PRACTITIONER

## 2022-12-01 PROCEDURE — 84100 ASSAY OF PHOSPHORUS: CPT | Performed by: NURSE PRACTITIONER

## 2022-12-01 PROCEDURE — 83036 HEMOGLOBIN GLYCOSYLATED A1C: CPT | Performed by: NURSE PRACTITIONER

## 2022-12-01 PROCEDURE — 99204 OFFICE O/P NEW MOD 45 MIN: CPT | Performed by: NURSE PRACTITIONER

## 2022-12-01 RX ORDER — GABAPENTIN 100 MG/1
2 CAPSULE ORAL
COMMUNITY
End: 2023-03-09

## 2022-12-01 RX ORDER — CYCLOBENZAPRINE HCL 10 MG
10 TABLET ORAL 3 TIMES DAILY PRN
COMMUNITY
End: 2023-04-05

## 2022-12-01 RX ORDER — ALBUTEROL SULFATE 90 UG/1
2 AEROSOL, METERED RESPIRATORY (INHALATION) EVERY 4 HOURS PRN
COMMUNITY
End: 2023-01-25 | Stop reason: SDUPTHER

## 2022-12-01 RX ORDER — VENLAFAXINE HYDROCHLORIDE 150 MG/1
150 CAPSULE, EXTENDED RELEASE ORAL DAILY
COMMUNITY
End: 2023-03-09 | Stop reason: SDUPTHER

## 2022-12-01 RX ORDER — BUSPIRONE HYDROCHLORIDE 15 MG/1
15 TABLET ORAL 3 TIMES DAILY PRN
COMMUNITY
End: 2023-03-09 | Stop reason: SDUPTHER

## 2022-12-01 NOTE — ASSESSMENT & PLAN NOTE
Headaches are worsening.  She is having atypical migraine headaches.  She has multiple heart conditions and should not take triptan medication.  She should also not take NSAIDs due to low GFR in the past.  I will start her on Ubrelvy to take as needed for migraine headaches.  Samples were given in the office today.  She will follow-up with me in about 5 weeks or sooner if any new or worsening of symptoms.

## 2022-12-01 NOTE — ASSESSMENT & PLAN NOTE
I discussed with her that she is not a candidate for estrogen due to being a smoker.  I recommend that she try over-the-counter black cohosh once daily and vitamin E 400 units twice daily.  Written instructions given to patient.  I also discussed with her to dress in layers.

## 2022-12-01 NOTE — ASSESSMENT & PLAN NOTE
She is currently on atorvastatin.  She is not fasting today so we will not check lipid panel.  We will plan to check lipid panel on her next visit.

## 2022-12-01 NOTE — PROGRESS NOTES
"Chief Complaint  Establish Care, Headache (Dizzy, off balance, and headache lasting three days ), and Hot Flashes (Ongoing, started about a year ago )    Subjective          Dang Hunt, 57 y.o. female presents to [facility]  History of Present Illness   She presents today as a new patient to establish care. She is complaining of feeling off balance, dizzy, hot flashes and headaches.  She states she has had migraines in the past but had not had them in a while.  She is now having a new type of headache that is chronic and daily.  She states she has had a headache constantly for 3 days.  She states she also \"hears a noise in her head and then gets dizzy.\"  She states she has had a hysterectomy in the past and was treated for hot flashes years ago.  She states she had had hot flashes in a long time but has recently started having hot flashes again.  She states that these hot flashes are different and she only gets hot when she is up moving around.  She previously was prescribed estrogen but no longer takes it.  She does \"lightly\" smoke cigarettes.    She has congestive heart failure and hypertension.  She follows with cardiology.  She is currently on Lasix 40 mg 1/2 to 1 tablet daily, chlorthalidone 25 mg daily.  She states she does not take potassium every day because it \"is too much.\"    She has COPD and follows with pulmonology.  She has allergies and takes Singulair daily also.    Depression/anxiety: She is currently on BuSpar 15 mg 3 times a day but states she only takes it as needed.  She is also on Effexor  mg.  She states she was also on a 75 mg but she stopped taking the 75 mg over a month ago.    She does follow with pain management and is on Percocet and gabapentin for chronic back pain.    She has hyperlipidemia and takes atorvastatin 40 mg every evening.  She has not had a lipid panel in a long time.  She is not fasting today.    She has vitamin D deficiency and " "currently takes vitamin D 5000 units daily.    She has borderline diabetes.  She has not had an A1c checked in a long time.    PHQ-9 Total Score: 0   Objective   Vital Signs:   /76 (BP Location: Left arm, Patient Position: Sitting, Cuff Size: Adult)   Pulse 65   Temp 97.3 °F (36.3 °C)   Ht 162.6 cm (64\")   Wt 117 kg (257 lb 11.2 oz)   SpO2 95%   BMI 44.23 kg/m²       Current Outpatient Medications:   •  albuterol sulfate  (90 Base) MCG/ACT inhaler, Inhale 2 puffs Every 4 (Four) Hours As Needed., Disp: , Rfl:   •  atorvastatin (LIPITOR) 40 MG tablet, Take 40 mg by mouth Every Night., Disp: , Rfl:   •  busPIRone (BUSPAR) 15 MG tablet, Take 15 mg by mouth 3 (Three) Times a Day As Needed for Anxiety., Disp: , Rfl:   •  chlorthalidone (HYGROTON) 25 MG tablet, Take 25 mg by mouth Daily., Disp: , Rfl:   •  cyclobenzaprine (FLEXERIL) 10 MG tablet, Take 10 mg by mouth 3 (Three) Times a Day As Needed for Muscle Spasms., Disp: , Rfl:   •  furosemide (LASIX) 40 MG tablet, Take 40 mg by mouth. 1/2-1 tab po qd depending on weight, Disp: , Rfl:   •  gabapentin (NEURONTIN) 100 MG capsule, Take 2 capsules by mouth every night at bedtime., Disp: , Rfl:   •  ketotifen (ZADITOR) 0.025 % ophthalmic solution, instill 1 drop IN EACH EYE TWICE DAILY AS NEEDED (AT least 8 hours APART), Disp: , Rfl:   •  metoprolol tartrate (LOPRESSOR) 50 MG tablet, Take 50 mg by mouth Daily., Disp: , Rfl:   •  montelukast (SINGULAIR) 10 MG tablet, Take 10 mg by mouth Every Morning., Disp: , Rfl:   •  oxyCODONE-acetaminophen (PERCOCET) 5-325 MG per tablet, Take 1 tablet by mouth Every 8 (Eight) Hours As Needed., Disp: , Rfl:   •  potassium chloride (K-DUR,KLOR-CON) 10 MEQ ER tablet, Take 10 mEq by mouth. 1/2 tab po qd, Disp: , Rfl:   •  venlafaxine XR (EFFEXOR-XR) 150 MG 24 hr capsule, Take 150 mg by mouth Daily., Disp: , Rfl:   •  vitamin D3 125 MCG (5000 UT) capsule capsule, Take 1 capsule by mouth Daily., Disp: , Rfl:   •  " fluticasone-salmeterol (Advair Diskus) 500-50 MCG/DOSE DISKUS, Inhale 1 puff 2 (Two) Times a Day for 30 days. Rinse mouth out after each use, Disp: 1 each, Rfl: 11  •  ubrogepant 100 MG tablet, Take 1 tablet by mouth 1 (One) Time As Needed (migraine)., Disp: 8 tablet, Rfl: 2   Past Medical History:   Diagnosis Date   • Arthritis    • Asthma    • CHF (congestive heart failure) ([facility]ly [facility])    • Chronic diastolic CHF (congestive heart failure) ([facility]) 6/10/2022   • Colitis    • COPD (chronic obstructive pulmonary disease) ([facility])    • Depression    • Dysphagia    • Edema    • Fatigue    • GERD (gastroesophageal reflux disease)    • History of acute pancreatitis    • History of histoplasmosis    • HTN (hypertension)    • Hyperlipidemia    • Light headed    • Migraines    • Multiple joint pain    • On home oxygen therapy     2 L AT NIGHT   • MANI (obstructive sleep apnea)     WEARS CPAP   • RLS (restless legs syndrome)       Physical Exam  Vitals reviewed.   Constitutional:       Appearance: Normal appearance. She is well-developed. She is morbidly obese.   HENT:      Right Ear: Tympanic membrane, ear canal and external ear normal.      Left Ear: Tympanic membrane, ear canal and external ear normal.      Nose:      Right Sinus: No maxillary sinus tenderness or frontal sinus tenderness.      Left Sinus: No maxillary sinus tenderness or frontal sinus tenderness.      Mouth/Throat:      Mouth: Mucous membranes are moist.      Pharynx: No pharyngeal swelling, oropharyngeal exudate or posterior oropharyngeal erythema.   Neck:      Thyroid: No thyroid mass, thyromegaly or thyroid tenderness.   Cardiovascular:      Rate and Rhythm: Normal rate and regular rhythm.      Heart sounds: No murmur heard.    No friction rub. No gallop.   Pulmonary:      Effort: Pulmonary effort is normal.      Breath sounds: Normal breath sounds. No wheezing or rhonchi.   Lymphadenopathy:      Cervical: No cervical adenopathy.   Skin:     General: Skin is warm  and dry.   Neurological:      Mental Status: She is alert and oriented to person, place, and time.      Cranial Nerves: No cranial nerve deficit.   Psychiatric:         Mood and Affect: Mood and affect normal.         Behavior: Behavior normal.         Thought Content: Thought content normal. Thought content does not include homicidal or suicidal ideation.         Judgment: Judgment normal.        Result Review :                 Assessment and Plan    Diagnoses and all orders for this visit:    1. Headache, worsening (Primary)  Comments:  Due to her worsening of headache and change in headaches, will order MRI of the brain.  Orders:  -     MRI Brain With & Without Contrast; Future    2. Dizziness  -     CBC Auto Differential  -     Comprehensive Metabolic Panel  -     Magnesium  -     Phosphorus  -     MRI Brain With & Without Contrast; Future    3. Intractable migraine with status migrainosus, unspecified migraine type  Assessment & Plan:  Headaches are worsening.  She is having atypical migraine headaches.  She has multiple heart conditions and should not take triptan medication.  She should also not take NSAIDs due to low GFR in the past.  I will start her on Ubrelvy to take as needed for migraine headaches.  Samples were given in the office today.  She will follow-up with me in about 5 weeks or sooner if any new or worsening of symptoms.        Orders:  -     ubrogepant 100 MG tablet; Take 1 tablet by mouth 1 (One) Time As Needed (migraine).  Dispense: 8 tablet; Refill: 2    4. Hot flashes  Assessment & Plan:  I discussed with her that she is not a candidate for estrogen due to being a smoker.  I recommend that she try over-the-counter black cohosh once daily and vitamin E 400 units twice daily.  Written instructions given to patient.  I also discussed with her to dress in layers.    Orders:  -     CBC Auto Differential  -     Comprehensive Metabolic Panel  -     Magnesium  -     Phosphorus    5. Borderline  diabetes  Assessment & Plan:  We will check an A1c today with her labs.    Orders:  -     Hemoglobin A1c    6. Essential hypertension  Assessment & Plan:  Hypertension is improving with treatment.  Continue current treatment regimen.  Continue current medications.  Blood pressure will be reassessed at the next regular appointment.    Orders:  -     CBC Auto Differential  -     Comprehensive Metabolic Panel  -     Magnesium  -     Phosphorus    7. Vitamin D deficiency  Assessment & Plan:  We will check a vitamin D level with her labs today.    Orders:  -     Vitamin D,25-Hydroxy    8. Mixed hyperlipidemia  Assessment & Plan:  She is currently on atorvastatin.  She is not fasting today so we will not check lipid panel.  We will plan to check lipid panel on her next visit.        Follow Up   Return in about 5 weeks (around 1/5/2023) for Next scheduled follow up.  Patient was given instructions and counseling regarding her condition or for health maintenance advice. Please see specific information pulled into the AVS if appropriate.       Maria De Jesus Narayanan, ROSHNI  12/01/2022

## 2022-12-02 LAB
25(OH)D3 SERPL-MCNC: 62.1 NG/ML (ref 30–100)
ALBUMIN SERPL-MCNC: 4.1 G/DL (ref 3.5–5.2)
ALBUMIN/GLOB SERPL: 1.4 G/DL
ALP SERPL-CCNC: 113 U/L (ref 39–117)
ALT SERPL W P-5'-P-CCNC: 19 U/L (ref 1–33)
ANION GAP SERPL CALCULATED.3IONS-SCNC: 14.9 MMOL/L (ref 5–15)
AST SERPL-CCNC: 18 U/L (ref 1–32)
BILIRUB SERPL-MCNC: 0.2 MG/DL (ref 0–1.2)
BUN SERPL-MCNC: 15 MG/DL (ref 6–20)
BUN/CREAT SERPL: 10.4 (ref 7–25)
CALCIUM SPEC-SCNC: 9.4 MG/DL (ref 8.6–10.5)
CHLORIDE SERPL-SCNC: 96 MMOL/L (ref 98–107)
CO2 SERPL-SCNC: 30.1 MMOL/L (ref 22–29)
CREAT SERPL-MCNC: 1.44 MG/DL (ref 0.57–1)
EGFRCR SERPLBLD CKD-EPI 2021: 42.8 ML/MIN/1.73
GLOBULIN UR ELPH-MCNC: 3 GM/DL
GLUCOSE SERPL-MCNC: 97 MG/DL (ref 65–99)
MAGNESIUM SERPL-MCNC: 2.1 MG/DL (ref 1.6–2.6)
PHOSPHATE SERPL-MCNC: 3.7 MG/DL (ref 2.5–4.5)
POTASSIUM SERPL-SCNC: 3 MMOL/L (ref 3.5–5.2)
PROT SERPL-MCNC: 7.1 G/DL (ref 6–8.5)
SODIUM SERPL-SCNC: 141 MMOL/L (ref 136–145)

## 2022-12-16 ENCOUNTER — HOSPITAL ENCOUNTER (OUTPATIENT)
Dept: MRI IMAGING | Facility: HOSPITAL | Age: 57
Discharge: HOME | End: 2022-12-16
Admitting: NURSE PRACTITIONER

## 2022-12-16 DIAGNOSIS — R51.9 HEADACHE, WORSENING: ICD-10-CM

## 2022-12-16 DIAGNOSIS — R42 DIZZINESS: ICD-10-CM

## 2022-12-16 PROCEDURE — 70553 MRI BRAIN STEM W/O & W/DYE: CPT

## 2022-12-16 PROCEDURE — A9577 INJ MULTIHANCE: HCPCS | Performed by: NURSE PRACTITIONER

## 2022-12-16 PROCEDURE — 0 GADOBENATE DIMEGLUMINE 529 MG/ML SOLUTION: Performed by: NURSE PRACTITIONER

## 2022-12-16 RX ORDER — AMOXICILLIN 875 MG/1
875 TABLET, COATED ORAL 2 TIMES DAILY
Qty: 20 TABLET | Refills: 0 | Status: SHIPPED | OUTPATIENT
Start: 2022-12-16 | End: 2022-12-26

## 2022-12-16 RX ADMIN — GADOBENATE DIMEGLUMINE 20 ML: 529 INJECTION, SOLUTION INTRAVENOUS at 14:32

## 2022-12-30 ENCOUNTER — TELEPHONE (OUTPATIENT)
Dept: FAMILY MEDICINE CLINIC | Facility: CLINIC | Age: 57
End: 2022-12-30

## 2022-12-30 RX ORDER — POTASSIUM CHLORIDE 750 MG/1
10 TABLET, FILM COATED, EXTENDED RELEASE ORAL DAILY
Qty: 30 TABLET | Refills: 2 | Status: SHIPPED | OUTPATIENT
Start: 2022-12-30 | End: 2023-01-08 | Stop reason: SDUPTHER

## 2022-12-30 RX ORDER — POTASSIUM CHLORIDE 750 MG/1
10 TABLET, FILM COATED, EXTENDED RELEASE ORAL DAILY
Qty: 30 TABLET | Refills: 2 | Status: SHIPPED | OUTPATIENT
Start: 2022-12-30 | End: 2022-12-30 | Stop reason: SDUPTHER

## 2023-01-06 ENCOUNTER — OFFICE VISIT (OUTPATIENT)
Dept: FAMILY MEDICINE CLINIC | Facility: CLINIC | Age: 58
End: 2023-01-06
Payer: COMMERCIAL

## 2023-01-06 VITALS
TEMPERATURE: 97.7 F | HEIGHT: 64 IN | SYSTOLIC BLOOD PRESSURE: 131 MMHG | WEIGHT: 256.7 LBS | HEART RATE: 70 BPM | OXYGEN SATURATION: 98 % | BODY MASS INDEX: 43.83 KG/M2 | DIASTOLIC BLOOD PRESSURE: 79 MMHG

## 2023-01-06 DIAGNOSIS — E78.2 MIXED HYPERLIPIDEMIA: ICD-10-CM

## 2023-01-06 DIAGNOSIS — E11.9 TYPE 2 DIABETES MELLITUS WITHOUT COMPLICATION, WITHOUT LONG-TERM CURRENT USE OF INSULIN: Primary | ICD-10-CM

## 2023-01-06 DIAGNOSIS — M79.642 BILATERAL HAND PAIN: ICD-10-CM

## 2023-01-06 DIAGNOSIS — I10 ESSENTIAL HYPERTENSION: ICD-10-CM

## 2023-01-06 DIAGNOSIS — M79.641 BILATERAL HAND PAIN: ICD-10-CM

## 2023-01-06 DIAGNOSIS — J34.9 PARANASAL SINUS DISEASE: ICD-10-CM

## 2023-01-06 DIAGNOSIS — E87.6 HYPOKALEMIA: ICD-10-CM

## 2023-01-06 PROBLEM — M54.9 BACK PAIN: Status: ACTIVE | Noted: 2023-01-06

## 2023-01-06 PROBLEM — U09.9 COVID-19 LONG HAULER: Status: ACTIVE | Noted: 2023-01-06

## 2023-01-06 PROBLEM — M47.819 SPONDYLOSIS WITHOUT MYELOPATHY: Status: ACTIVE | Noted: 2022-12-11

## 2023-01-06 PROBLEM — R06.02 SHORTNESS OF BREATH: Status: ACTIVE | Noted: 2023-01-06

## 2023-01-06 PROBLEM — N39.0 UTI (URINARY TRACT INFECTION), BACTERIAL: Status: RESOLVED | Noted: 2023-01-06 | Resolved: 2023-01-06

## 2023-01-06 PROBLEM — Z01.812 PRE-PROCEDURE LAB EXAM: Status: ACTIVE | Noted: 2023-01-06

## 2023-01-06 PROBLEM — K59.09 CHRONIC CONSTIPATION: Status: ACTIVE | Noted: 2023-01-06

## 2023-01-06 PROBLEM — I50.9 CONGESTIVE HEART FAILURE: Status: ACTIVE | Noted: 2023-01-06

## 2023-01-06 PROBLEM — Z98.84 HISTORY OF GASTRIC RESTRICTIVE SURGERY: Status: ACTIVE | Noted: 2023-01-06

## 2023-01-06 PROBLEM — R73.03 BORDERLINE DIABETES: Status: RESOLVED | Noted: 2022-12-01 | Resolved: 2023-01-06

## 2023-01-06 PROBLEM — B37.41 YEAST CYSTITIS: Status: ACTIVE | Noted: 2023-01-06

## 2023-01-06 PROBLEM — J30.9 ALLERGIC RHINITIS: Status: ACTIVE | Noted: 2023-01-06

## 2023-01-06 PROBLEM — N39.0 UTI (URINARY TRACT INFECTION), BACTERIAL: Status: ACTIVE | Noted: 2023-01-06

## 2023-01-06 PROBLEM — M79.643 HAND PAIN: Status: ACTIVE | Noted: 2023-01-06

## 2023-01-06 PROBLEM — E66.9 OBESITY (BMI 30-39.9): Status: ACTIVE | Noted: 2023-01-06

## 2023-01-06 PROBLEM — G62.9 NEUROPATHY: Status: ACTIVE | Noted: 2023-01-06

## 2023-01-06 PROBLEM — A49.9 UTI (URINARY TRACT INFECTION), BACTERIAL: Status: RESOLVED | Noted: 2023-01-06 | Resolved: 2023-01-06

## 2023-01-06 PROBLEM — A49.9 UTI (URINARY TRACT INFECTION), BACTERIAL: Status: ACTIVE | Noted: 2023-01-06

## 2023-01-06 PROBLEM — Z12.39 SCREENING FOR BREAST CANCER: Status: ACTIVE | Noted: 2023-01-06

## 2023-01-06 PROBLEM — R05.9 COUGH: Status: ACTIVE | Noted: 2023-01-06

## 2023-01-06 PROBLEM — L30.4 INTERTRIGO: Status: ACTIVE | Noted: 2023-01-06

## 2023-01-06 LAB
ANION GAP SERPL CALCULATED.3IONS-SCNC: 10.5 MMOL/L (ref 5–15)
BUN SERPL-MCNC: 13 MG/DL (ref 6–20)
BUN/CREAT SERPL: 12.7 (ref 7–25)
CALCIUM SPEC-SCNC: 9.3 MG/DL (ref 8.6–10.5)
CHLORIDE SERPL-SCNC: 101 MMOL/L (ref 98–107)
CO2 SERPL-SCNC: 30.5 MMOL/L (ref 22–29)
CREAT SERPL-MCNC: 1.02 MG/DL (ref 0.57–1)
EGFRCR SERPLBLD CKD-EPI 2021: 64.7 ML/MIN/1.73
GLUCOSE SERPL-MCNC: 66 MG/DL (ref 65–99)
POTASSIUM SERPL-SCNC: 3.2 MMOL/L (ref 3.5–5.2)
SODIUM SERPL-SCNC: 142 MMOL/L (ref 136–145)

## 2023-01-06 PROCEDURE — 99214 OFFICE O/P EST MOD 30 MIN: CPT | Performed by: NURSE PRACTITIONER

## 2023-01-06 PROCEDURE — 80048 BASIC METABOLIC PNL TOTAL CA: CPT | Performed by: NURSE PRACTITIONER

## 2023-01-06 NOTE — PROGRESS NOTES
Chief Complaint  Headache (Follow up ), Hand Pain (Pain and stiffness in knuckles of right hand ), and Diabetes    Subjective          Dang Hunt, 57 y.o. female presents to [facility]  History of Present Illness   She presents today for a follow-up on headaches and new onset diabetes.    Diabetes type 2, newly identified: Her last A1c was elevated at 6.8%.  I did not put her on any medications due to all her current symptoms she was having.  I advised her to start following a diet low in carbs and sugars.  She does have a glucometer to check her blood sugars.  She states her blood sugars have ranged between 120-150 at random times that she checks it.  Her GFR was also low at 42.8.    She had been having worsening of headaches on her last visit but states her headaches are better now.  An MRI was ordered on her last visit.  No acute intracranial abnormalities were found, minimal chronic small vessel ischemic change and paranasal sinus mucosal disease noted.  She states that she often does have sinus problems.  She does not currently have any sinus symptoms.    Hyperlipidemia: She is currently on atorvastatin 40 mg every evening.  We did not check her cholesterol levels last time because she was not fasting.    Hypertension: Blood pressure is stable.    He has congestive heart failure and is on diuretics. Her potassium was low so advised her to take her potassium tablet every day.  We will recheck her potassium level today.    She states she has bilateral burning in her hand joints that comes and goes.  She is currently on Percocet and gabapentin per pain management.  Advised her that she cannot take NSAIDs due to the low GFR.  Objective   Vital Signs:   /79 (BP Location: Left arm, Patient Position: Sitting, Cuff Size: Adult)   Pulse 70   Temp 97.7 °F (36.5 °C)   Ht 162.6 cm (64\")   Wt 116 kg (256 lb 11.2 oz)   SpO2 98%   BMI 44.06 kg/m²       Current Outpatient  Medications:   •  albuterol sulfate  (90 Base) MCG/ACT inhaler, Inhale 2 puffs Every 4 (Four) Hours As Needed., Disp: , Rfl:   •  atorvastatin (LIPITOR) 40 MG tablet, Take 40 mg by mouth Every Night., Disp: , Rfl:   •  busPIRone (BUSPAR) 15 MG tablet, Take 15 mg by mouth 3 (Three) Times a Day As Needed for Anxiety., Disp: , Rfl:   •  chlorthalidone (HYGROTON) 25 MG tablet, Take 25 mg by mouth Daily., Disp: , Rfl:   •  cyclobenzaprine (FLEXERIL) 10 MG tablet, Take 10 mg by mouth 3 (Three) Times a Day As Needed for Muscle Spasms., Disp: , Rfl:   •  furosemide (LASIX) 40 MG tablet, Take 40 mg by mouth. 1/2-1 tab po qd depending on weight, Disp: , Rfl:   •  gabapentin (NEURONTIN) 100 MG capsule, Take 2 capsules by mouth every night at bedtime., Disp: , Rfl:   •  ketotifen (ZADITOR) 0.025 % ophthalmic solution, instill 1 drop IN EACH EYE TWICE DAILY AS NEEDED (AT least 8 hours APART), Disp: , Rfl:   •  metoprolol tartrate (LOPRESSOR) 50 MG tablet, Take 50 mg by mouth Daily., Disp: , Rfl:   •  montelukast (SINGULAIR) 10 MG tablet, Take 10 mg by mouth Every Morning., Disp: , Rfl:   •  oxyCODONE-acetaminophen (PERCOCET) 5-325 MG per tablet, Take 1 tablet by mouth Every 8 (Eight) Hours As Needed., Disp: , Rfl:   •  potassium chloride 10 MEQ CR tablet, Take 1 tablet by mouth Daily., Disp: 30 tablet, Rfl: 2  •  ubrogepant 100 MG tablet, Take 1 tablet by mouth 1 (One) Time As Needed (migraine)., Disp: 8 tablet, Rfl: 2  •  venlafaxine XR (EFFEXOR-XR) 150 MG 24 hr capsule, Take 150 mg by mouth Daily., Disp: , Rfl:   •  vitamin D3 125 MCG (5000 UT) capsule capsule, Take 1 capsule by mouth Daily., Disp: , Rfl:   •  fluticasone-salmeterol (Advair Diskus) 500-50 MCG/DOSE DISKUS, Inhale 1 puff 2 (Two) Times a Day for 30 days. Rinse mouth out after each use, Disp: 1 each, Rfl: 11   Past Medical History:   Diagnosis Date   • Arthritis    • Asthma    • CHF (congestive heart failure) (HCC)    • Chronic diastolic CHF (congestive  heart failure) (Formerly [facility]) 6/10/2022   • Colitis    • COPD (chronic obstructive pulmonary disease) ([facility])    • Depression    • Dysphagia    • Edema    • Fatigue    • GERD (gastroesophageal reflux disease)    • History of acute pancreatitis    • History of histoplasmosis    • HTN (hypertension)    • Hyperlipidemia    • Light headed    • Migraines    • Multiple joint pain    • On home oxygen therapy     2 L AT NIGHT   • MANI (obstructive sleep apnea)     WEARS CPAP   • RLS (restless legs syndrome)    • Type 2 diabetes mellitus without complication, without long-term current use of insulin (Former[facility]) 1/6/2023      Physical Exam  Vitals reviewed.   Constitutional:       Appearance: Normal appearance. She is well-developed. She is morbidly obese.   Neck:      Thyroid: No thyroid mass, thyromegaly or thyroid tenderness.   Cardiovascular:      Rate and Rhythm: Normal rate and regular rhythm.      Heart sounds: No murmur heard.    No friction rub. No gallop.   Pulmonary:      Effort: Pulmonary effort is normal.      Breath sounds: Normal breath sounds. No wheezing or rhonchi.   Lymphadenopathy:      Cervical: No cervical adenopathy.   Skin:     General: Skin is warm and dry.   Neurological:      Mental Status: She is alert and oriented to person, place, and time.      Cranial Nerves: No cranial nerve deficit.   Psychiatric:         Mood and Affect: Mood and affect normal.         Behavior: Behavior normal.         Thought Content: Thought content normal. Thought content does not include homicidal or suicidal ideation.         Judgment: Judgment normal.        Result Review :     Common labs    Common Labs 1/20/22 6/10/22 12/1/22 12/1/22 12/1/22      1555 1555 1555   Glucose  75   97   BUN  17   15   Creatinine  1.07 (A)   1.44 (A)   Sodium  140   141   Potassium  3.2 (A)   3.0 (A)   Chloride  99   96 (A)   Calcium  10.1   9.4   Albumin     4.10   Total Bilirubin     0.2   Alkaline Phosphatase     113   AST (SGOT)     18   ALT (SGPT)      19   WBC   8.37     Hemoglobin   13.2     Hematocrit   41.0     Platelets   260     Total Cholesterol 176       Triglycerides 293 (A)       HDL Cholesterol 30 (A)       LDL Cholesterol  96       Hemoglobin A1C    6.80 (A)    (A) Abnormal value                 PROCEDURE:  MRI BRAIN W WO CONTRAST     COMPARISON: None     INDICATIONS:  CHRONIC HEADACHE WITH INCREASING FREQUENCY            TECHNIQUE:    A variety of imaging planes and parameters were utilized for visualization of suspected   pathology.  Images were performed without and with gadolinium contrast.     FINDINGS:          There is no diffusion restriction to suggest acute infarct.  There is no evidence of a mass or mass   effect.  No abnormal extra-axial collections.  There is no abnormal enhancement.  There is a mucous   retention cyst in the right sphenoid sinus.  There is normal enhancement within the intracranial   vasculature.  Calvarial and superficial soft tissue signal is within normal limits.  There are a   few small foci of FLAIR hyperintense signal within the cerebral white matter.  Major arterial flow   voids appear intact.  Orbits appear unremarkable.  There is mild maxillary, sphenoid and ethmoid   sinus mucosal disease.     IMPRESSION:                 1. No acute intracranial abnormality.  2. Minimal chronic small vessel ischemic change.  3. Paranasal sinus mucosal disease.      LEXUS LEW MD         Electronically Signed and Approved By: LEXUS LEW MD on 12/16/2022 at 15:21        Assessment and Plan    Diagnoses and all orders for this visit:    1. Type 2 diabetes mellitus without complication, without long-term current use of insulin (HCC) (Primary)  Assessment & Plan:  Diabetes is newly identified.   Reminded to bring in blood sugar diary at next visit.  Dietary recommendations for ADA diet.  Reminded to get yearly retinal exam.  She will continue to work on her diet and weight loss. We will check labs with A1c and urine  microalbumin in 2 months.   Diabetes will be reassessed in 2 months.    Orders:  -     Basic Metabolic Panel  -     Hemoglobin A1c; Future  -     Comprehensive Metabolic Panel; Future  -     Microalbumin / Creatinine Urine Ratio - Urine, Clean Catch; Future    2. Hypokalemia  Assessment & Plan:  She is now taking potassium daily, we will recheck her potassium level in GFR today with a BMP.    Orders:  -     Basic Metabolic Panel  -     Comprehensive Metabolic Panel; Future    3. Essential hypertension  Assessment & Plan:  Hypertension is improving with treatment.  Continue current treatment regimen.  Continue current medications.  Blood pressure will be reassessed at the next regular appointment.    Orders:  -     Comprehensive Metabolic Panel; Future    4. Mixed hyperlipidemia  Assessment & Plan:  Currently stable on atorvastatin, will continue current dose.  Lipids will be reassessed in 2 months.    Orders:  -     Comprehensive Metabolic Panel; Future  -     Lipid Panel; Future    5. Paranasal sinus disease  Assessment & Plan:  I recommended that she do routine sinus rinses.  Sinus rinse bottle/kit sample given to her in the office and discussed with her today.  I also recommended that if she continues to have sinus problems that she should see ENT.      6. Bilateral hand pain  Assessment & Plan:  She is already on Percocet and gabapentin for pain management.  Advised not to take any NSAIDs due to her low GFR.  We are rechecking her kidney functions again today with a BMP.  Recommended hand x-rays, will wait for now.        Follow Up   Return in about 2 months (around 3/6/2023) for Next scheduled follow up DM, Fasting Labs one week prior to next Appointment.  Patient was given instructions and counseling regarding her condition or for health maintenance advice. Please see specific information pulled into the AVS if appropriate.       Maria De Jesus Narayanan, ROSHNI  01/06/2023

## 2023-01-07 NOTE — ASSESSMENT & PLAN NOTE
I recommended that she do routine sinus rinses.  Sinus rinse bottle/kit sample given to her in the office and discussed with her today.  I also recommended that if she continues to have sinus problems that she should see ENT.

## 2023-01-07 NOTE — ASSESSMENT & PLAN NOTE
She is already on Percocet and gabapentin for pain management.  Advised not to take any NSAIDs due to her low GFR.  We are rechecking her kidney functions again today with a BMP.  Recommended hand x-rays, will wait for now.

## 2023-01-07 NOTE — ASSESSMENT & PLAN NOTE
Currently stable on atorvastatin, will continue current dose.  Lipids will be reassessed in 2 months.

## 2023-01-07 NOTE — ASSESSMENT & PLAN NOTE
Hypertension is improving with treatment.  Continue current treatment regimen.  Continue current medications.  Blood pressure will be reassessed at the next regular appointment.

## 2023-01-07 NOTE — ASSESSMENT & PLAN NOTE
Diabetes is newly identified.   Reminded to bring in blood sugar diary at next visit.  Dietary recommendations for ADA diet.  Reminded to get yearly retinal exam.  She will continue to work on her diet and weight loss. We will check labs with A1c and urine microalbumin in 2 months.   Diabetes will be reassessed in 2 months.

## 2023-01-08 RX ORDER — POTASSIUM CHLORIDE 750 MG/1
30 TABLET, FILM COATED, EXTENDED RELEASE ORAL DAILY
Qty: 90 TABLET | Refills: 2 | Status: SHIPPED | OUTPATIENT
Start: 2023-01-08

## 2023-01-25 ENCOUNTER — OFFICE VISIT (OUTPATIENT)
Dept: PULMONOLOGY | Facility: CLINIC | Age: 58
End: 2023-01-25
Payer: COMMERCIAL

## 2023-01-25 VITALS
RESPIRATION RATE: 18 BRPM | BODY MASS INDEX: 42.5 KG/M2 | SYSTOLIC BLOOD PRESSURE: 131 MMHG | HEIGHT: 65 IN | HEART RATE: 70 BPM | WEIGHT: 255.1 LBS | TEMPERATURE: 98 F | OXYGEN SATURATION: 99 % | DIASTOLIC BLOOD PRESSURE: 95 MMHG

## 2023-01-25 DIAGNOSIS — E66.01 OBESITY, CLASS III, BMI 40-49.9 (MORBID OBESITY): ICD-10-CM

## 2023-01-25 DIAGNOSIS — G47.33 OSA (OBSTRUCTIVE SLEEP APNEA): ICD-10-CM

## 2023-01-25 DIAGNOSIS — R06.02 SHORTNESS OF BREATH: ICD-10-CM

## 2023-01-25 DIAGNOSIS — K21.9 GASTROESOPHAGEAL REFLUX DISEASE WITHOUT ESOPHAGITIS: ICD-10-CM

## 2023-01-25 DIAGNOSIS — R05.9 COUGH, UNSPECIFIED TYPE: ICD-10-CM

## 2023-01-25 DIAGNOSIS — J34.9 PARANASAL SINUS DISEASE: ICD-10-CM

## 2023-01-25 DIAGNOSIS — I50.32 CHRONIC HEART FAILURE WITH PRESERVED EJECTION FRACTION: ICD-10-CM

## 2023-01-25 DIAGNOSIS — J44.9 ASTHMA-COPD OVERLAP SYNDROME: ICD-10-CM

## 2023-01-25 DIAGNOSIS — I50.9 CONGESTIVE HEART FAILURE, UNSPECIFIED HF CHRONICITY, UNSPECIFIED HEART FAILURE TYPE: ICD-10-CM

## 2023-01-25 DIAGNOSIS — R53.82 CHRONIC FATIGUE: ICD-10-CM

## 2023-01-25 DIAGNOSIS — J30.9 ALLERGIC RHINITIS, UNSPECIFIED SEASONALITY, UNSPECIFIED TRIGGER: Primary | ICD-10-CM

## 2023-01-25 DIAGNOSIS — U09.9 COVID-19 LONG HAULER: ICD-10-CM

## 2023-01-25 DIAGNOSIS — Z72.0 TOBACCO ABUSE: ICD-10-CM

## 2023-01-25 PROCEDURE — 99214 OFFICE O/P EST MOD 30 MIN: CPT | Performed by: INTERNAL MEDICINE

## 2023-01-25 RX ORDER — GABAPENTIN 100 MG/1
CAPSULE ORAL EVERY 12 HOURS SCHEDULED
COMMUNITY

## 2023-01-25 RX ORDER — MONTELUKAST SODIUM 10 MG/1
10 TABLET ORAL EVERY MORNING
Qty: 90 TABLET | Refills: 3 | Status: SHIPPED | OUTPATIENT
Start: 2023-01-25

## 2023-01-25 RX ORDER — METOPROLOL SUCCINATE 50 MG/1
50 TABLET, EXTENDED RELEASE ORAL DAILY
COMMUNITY
Start: 2023-01-10 | End: 2023-03-09 | Stop reason: SDUPTHER

## 2023-01-25 RX ORDER — FLUTICASONE PROPIONATE AND SALMETEROL XINAFOATE 230; 21 UG/1; UG/1
2 AEROSOL, METERED RESPIRATORY (INHALATION)
Qty: 1 EACH | Refills: 11 | Status: SHIPPED | OUTPATIENT
Start: 2023-01-25

## 2023-01-25 RX ORDER — VARENICLINE TARTRATE 1 MG/1
1 TABLET, FILM COATED ORAL 2 TIMES DAILY
Qty: 60 TABLET | Refills: 4 | Status: SHIPPED | OUTPATIENT
Start: 2023-01-25

## 2023-01-25 RX ORDER — ALBUTEROL SULFATE 90 UG/1
2 AEROSOL, METERED RESPIRATORY (INHALATION) EVERY 4 HOURS PRN
Qty: 1 G | Refills: 11 | Status: SHIPPED | OUTPATIENT
Start: 2023-01-25

## 2023-01-25 NOTE — PROGRESS NOTES
Primary Care Provider  Maria De Jesus Narayanan APRN     Referring Provider  No ref. provider found     Chief Complaint  Asthma, COPD, Sleep Apnea, and Follow-up (4-6 month follow up)    Subjective          Dang Hunt presents to [facility] PULMONARY & CRITICAL CARE MEDICINE  History of Present Illness  Dang Hunt is a 57 y.o. female patient morbid obesity, obstructive sleep apnea, diastolic heart failure, asthma and COPD overlap syndrome.  She is here for follow-up.  Since her last office visit, she has been using Advair 2 puffs twice daily and albuterol as needed which she uses once or twice a week.  She has not needed any antibiotics or steroids since last office visit.  She has been diagnosed with diabetes and is currently on medication.  She is currently smoking 10 cigarettes a day.  She continues to use her CPAP machine.  [employer] is her [employer].  I reviewed the CPAP uses data with her today.  Her average daily use has been about 8 hours on 48 minutes, apnea-hypopnea index on it is 4.  CPAP pressure is at 11 cm of water.  She is willing to try to quit smoking.  She has not had significant changes in weight or appetite.  No fever or chills.  No nausea or vomiting.  Tries to keep her self active, is short of breath with minimal exertion.    Review of Systems     General:  No Fatigue, No Fever No weight loss or loss of appetite  HEENT: No dysphagia, No Visual Changes, no rhinorrhea  Respiratory:  + cough,+Dyspnea, no phlegm, No Pleuritic Pain, no wheezing, no hemoptysis.  Cardiovascular: Denies chest pain, denies palpitations,+OBRIEN, No Chest Pressure  Gastrointestinal:  No Abdominal Pain, No Nausea, No Vomiting, No Diarrhea  Genitourinary:  No Dysuria, No Frequency, No Hesitancy  Musculoskeletal: No muscle pain or swelling  Endocrine:  No Heat Intolerance, No Cold Intolerance  Hematologic:  No Bleeding, No Bruising  Psychiatric:  No Anxiety, No Depression  Neurologic:  No Confusion,  no Dysarthria, No Headaches  Skin:  No Rash, No Open Wounds    Family History   Problem Relation Age of Onset   • Obesity Mother    • Hypertension Mother    • COPD Mother    • Obesity Sister    • Hypertension Sister    • Obesity Maternal Grandmother    • Hypertension Maternal Grandmother    • Stroke Maternal Grandmother    • Heart attack Maternal Grandmother    • Arrhythmia Father    • Hypertension Father         Social History     Socioeconomic History   • Marital status:    • Number of children: 2   Tobacco Use   • Smoking status: Light Smoker     Packs/day: 0.50     Years: 35.00     Pack years: 17.50     Types: Cigarettes     Start date: 1/1/1985   • Smokeless tobacco: Never   Vaping Use   • Vaping Use: Never used   Substance and Sexual Activity   • Alcohol use: No   • Drug use: No   • Sexual activity: Yes     Partners: Male     Birth control/protection: Surgical        Past Medical History:   Diagnosis Date   • Arthritis    • Asthma    • CHF (congestive heart failure) ([facility])    • Chronic diastolic CHF (congestive heart failure) ([facility]) 6/10/2022   • Colitis    • COPD (chronic obstructive pulmonary disease) ([facility])    • Depression    • Dysphagia    • Edema    • Fatigue    • GERD (gastroesophageal reflux disease)    • History of acute pancreatitis    • History of histoplasmosis    • HTN (hypertension)    • Hyperlipidemia    • Light headed    • Migraines    • Multiple joint pain    • On home oxygen therapy     2 L AT NIGHT   • MANI (obstructive sleep apnea)     WEARS CPAP   • RLS (restless legs syndrome)    • Type 2 diabetes mellitus without complication, without long-term current use of insulin ([facility]) 1/6/2023        Immunization History   Administered Date(s) Administered   • COVID-19 (MODERNA) 1st, 2nd, 3rd Dose Only 01/11/2022   • COVID-19 (PFIZER) BIVALENT BOOSTER 12+YRS 01/01/2022   • Flu Vaccine Quad PF >36MO 09/13/2016   • Influenza Quad Vaccine (Inpatient) 12/14/2012   • PPD Test 12/14/2012   • Td  05/24/2002         No Known Allergies       Current Outpatient Medications:   •  albuterol sulfate  (90 Base) MCG/ACT inhaler, Inhale 2 puffs Every 4 (Four) Hours As Needed for Wheezing or Shortness of Air., Disp: 1 g, Rfl: 11  •  atorvastatin (LIPITOR) 40 MG tablet, Take 40 mg by mouth Every Night., Disp: , Rfl:   •  busPIRone (BUSPAR) 15 MG tablet, Take 15 mg by mouth 3 (Three) Times a Day As Needed for Anxiety., Disp: , Rfl:   •  chlorthalidone (HYGROTON) 25 MG tablet, Take 25 mg by mouth Daily., Disp: , Rfl:   •  cyclobenzaprine (FLEXERIL) 10 MG tablet, Take 10 mg by mouth 3 (Three) Times a Day As Needed for Muscle Spasms., Disp: , Rfl:   •  furosemide (LASIX) 40 MG tablet, Take 40 mg by mouth. 1/2-1 tab po qd depending on weight, Disp: , Rfl:   •  gabapentin (NEURONTIN) 100 MG capsule, Take 2 capsules by mouth every night at bedtime., Disp: , Rfl:   •  gabapentin (NEURONTIN) 100 MG capsule, Every 12 (Twelve) Hours., Disp: , Rfl:   •  ketotifen (ZADITOR) 0.025 % ophthalmic solution, instill 1 drop IN EACH EYE TWICE DAILY AS NEEDED (AT least 8 hours APART), Disp: , Rfl:   •  metoprolol succinate XL (TOPROL-XL) 50 MG 24 hr tablet, Take 50 mg by mouth Daily., Disp: , Rfl:   •  metoprolol tartrate (LOPRESSOR) 50 MG tablet, Take 50 mg by mouth Daily., Disp: , Rfl:   •  montelukast (SINGULAIR) 10 MG tablet, Take 1 tablet by mouth Every Morning., Disp: 90 tablet, Rfl: 3  •  oxyCODONE-acetaminophen (PERCOCET) 5-325 MG per tablet, Take 1 tablet by mouth Every 8 (Eight) Hours As Needed., Disp: , Rfl:   •  potassium chloride 10 MEQ CR tablet, Take 3 tablets by mouth Daily., Disp: 90 tablet, Rfl: 2  •  ubrogepant 100 MG tablet, Take 1 tablet by mouth 1 (One) Time As Needed (migraine)., Disp: 8 tablet, Rfl: 2  •  venlafaxine XR (EFFEXOR-XR) 150 MG 24 hr capsule, Take 150 mg by mouth Daily., Disp: , Rfl:   •  vitamin D3 125 MCG (5000 UT) capsule capsule, Take 1 capsule by mouth Daily., Disp: , Rfl:   •   "fluticasone-salmeterol (Advair Diskus) 500-50 MCG/DOSE DISKUS, Inhale 1 puff 2 (Two) Times a Day for 30 days. Rinse mouth out after each use, Disp: 1 each, Rfl: 11  •  fluticasone-salmeterol (Advair HFA) 230-21 MCG/ACT inhaler, Inhale 2 puffs 2 (Two) Times a Day., Disp: 1 each, Rfl: 11  •  varenicline (CHANTIX) 1 MG tablet, Take 1 tablet by mouth 2 (Two) Times a Day., Disp: 60 tablet, Rfl: 4     Objective   Vital Signs:   /95 (BP Location: Left arm, Patient Position: Sitting, Cuff Size: Large Adult)   Pulse 70   Temp 98 °F (36.7 °C) (Tympanic)   Resp 18   Ht 165.1 cm (65\")   Wt 116 kg (255 lb 1.6 oz)   SpO2 99% Comment: room air/ PRN oxygen 2 liters  BMI 42.45 kg/m²     Mallampatti classification : 1  Physical Exam  Vital Signs Reviewed  Obese female, pleasant, in no acute distress, normal conversational  HEENT:  PERRL, EOMI.  OP, nares clear, no sinus tenderness  Neck:  Supple, no JVD, no thyromegaly  Lymph: no axillary, cervical, supraclavicular lymphadenopathy noted bilaterally  Chest:  good aeration, bilateral diminished breath sounds, no wheezing, crackles or rhonchi, resonant to percussion b/l  CV: RRR, no MGR, pulses 2+, equal  Abd:  Soft, NT, ND, + BS, no HSM  EXT:  no clubbing, no cyanosis, No BLE edema  Neuro:  A&Ox3, CN grossly intact, no focal deficits  Skin: No rashes or lesions noted     Result Review :   The following data was reviewed by: Gadiel Solis MD on 01/25/2023:  Common labs    Common Labs 6/10/22 12/1/22 12/1/22 12/1/22 1/6/23     1555 1555 1555    Glucose 75   97 66   BUN 17   15 13   Creatinine 1.07 (A)   1.44 (A) 1.02 (A)   Sodium 140   141 142   Potassium 3.2 (A)   3.0 (A) 3.2 (A)   Chloride 99   96 (A) 101   Calcium 10.1   9.4 9.3   Albumin    4.10    Total Bilirubin    0.2    Alkaline Phosphatase    113    AST (SGOT)    18    ALT (SGPT)    19    WBC  8.37      Hemoglobin  13.2      Hematocrit  41.0      Platelets  260      Hemoglobin A1C   6.80 (A)     (A) Abnormal value "            CMP    CMP 6/10/22 12/1/22 1/6/23   Glucose 75 97 66   BUN 17 15 13   Creatinine 1.07 (A) 1.44 (A) 1.02 (A)   eGFR 61.1 42.8 (A) 64.7   Sodium 140 141 142   Potassium 3.2 (A) 3.0 (A) 3.2 (A)   Chloride 99 96 (A) 101   Calcium 10.1 9.4 9.3   Total Protein  7.1    Albumin  4.10    Globulin  3.0    Total Bilirubin  0.2    Alkaline Phosphatase  113    AST (SGOT)  18    ALT (SGPT)  19    Albumin/Globulin Ratio  1.4    BUN/Creatinine Ratio 15.9 10.4 12.7   Anion Gap 13.7 14.9 10.5   (A) Abnormal value       Comments are available for some flowsheets but are not being displayed.           CBC    CBC 12/1/22   WBC 8.37   RBC 4.65   Hemoglobin 13.2   Hematocrit 41.0   MCV 88.2   MCH 28.4   MCHC 32.2   RDW 14.0   Platelets 260           CBC w/diff    CBC w/Diff 12/1/22   WBC 8.37   RBC 4.65   Hemoglobin 13.2   Hematocrit 41.0   MCV 88.2   MCH 28.4   MCHC 32.2   RDW 14.0   Platelets 260   Neutrophil Rel % 52.4   Immature Granulocyte Rel % 0.2   Lymphocyte Rel % 37.3   Monocyte Rel % 5.6   Eosinophil Rel % 3.5   Basophil Rel % 1.0           Data reviewed: Radiologic studies Previous imaging reviewed.       PROCEDURE:  CT CHEST WO CONTRAST DIAGNOSTIC     COMPARISON: [facility], CT, CHEST W/ CONTRAST, 6/01/2015, 13:54.  [facility], CT, CHEST W/ CONTRAST, 3/17/2016, 23:01.  [facility], CR, XR CHEST 1 VW,   8/09/2021, 11:37.  [facility], CR, XR CHEST 1 VW, 8/05/2021, 23:32.  [facility], CT, ABDOMEN/PELVIS WITH CONTRAST, 10/09/2020, 12:39.     INDICATIONS:  HX OF COVID PNEUMONIA, F/U     PROTOCOL:     Standard imaging protocol performed                 RADIATION:      DLP: 883.2mGy*cm               Automated exposure control was utilized to minimize radiation dose.      TECHNIQUE:    Axial images of the chest without intravenous contrast.     FINDINGS:  Mild emphysema is present in the mid and upper lung fields.  No evidence of pleural or  pericardial   effusion.  No evidence of pneumothorax.  There is no mediastinal, axillary or hilar adenopathy.    There are calcified prevascular nodes.  There are stable areas of scarring in the mid and lower   lung fields.  No evidence of peripheral reticulation or bronchiectasis.  There is a 3 mm   noncalcified nodule medially in the left lower lobe.  Degenerative changes are present in the   thoracic spine.  Prior cholecystectomy.  Prior gastric sleeve procedure.  Stable small adrenal   adenomas.  Stable 6 mm noncalcified right lower lobe nodule.     IMPRESSION:      1. Mild emphysema.     2. Stable mild scarring in the mid and lower lung fields.     3. 3 mm noncalcified nodule medially in the left lower lobe. The findings include a single,   incidentally detected, solid pulmonary nodule, measuring less than 6mm.  2017 guidelines from the   Fleischner Society for the follow-up and management of incidentally detected indeterminate   pulmonary nodules in persons at least 35 years of age depend on nodule size (average length and   width) and underlying risk factors (including smoking and other risk factors). Please consider the   following recommendations after clinical assessment of risk factors.  For <6mm solid nodules: In   low risk patients, no follow-up required.  If suspicious morphology or upper lobe location,   consider 12 month follow-up.  In high risk patients, optional CT in 12 months.         CRISTOPHER PERSON MD         Electronically Signed and Approved By: CRISTOPHER PERSON MD on 4/04/2022 at 12:12        Assessment and Plan    Diagnoses and all orders for this visit:    1. Allergic rhinitis, unspecified seasonality, unspecified trigger (Primary)  -     PAP Therapy  -     varenicline (CHANTIX) 1 MG tablet; Take 1 tablet by mouth 2 (Two) Times a Day.  Dispense: 60 tablet; Refill: 4  -     SARS-CoV-2 Antibody Profile, Nucleocapsid and Pipo; Future  -     montelukast (SINGULAIR) 10 MG tablet; Take 1  tablet by mouth Every Morning.  Dispense: 90 tablet; Refill: 3  -     albuterol sulfate  (90 Base) MCG/ACT inhaler; Inhale 2 puffs Every 4 (Four) Hours As Needed for Wheezing or Shortness of Air.  Dispense: 1 g; Refill: 11  -     fluticasone-salmeterol (Advair HFA) 230-21 MCG/ACT inhaler; Inhale 2 puffs 2 (Two) Times a Day.  Dispense: 1 each; Refill: 11    2. Chronic heart failure with preserved ejection fraction ([facility])    3. Congestive heart failure, unspecified HF chronicity, unspecified heart failure type ([facility])    4. Paranasal sinus disease  -     PAP Therapy  -     varenicline (CHANTIX) 1 MG tablet; Take 1 tablet by mouth 2 (Two) Times a Day.  Dispense: 60 tablet; Refill: 4  -     SARS-CoV-2 Antibody Profile, Nucleocapsid and Pipo; Future  -     montelukast (SINGULAIR) 10 MG tablet; Take 1 tablet by mouth Every Morning.  Dispense: 90 tablet; Refill: 3  -     albuterol sulfate  (90 Base) MCG/ACT inhaler; Inhale 2 puffs Every 4 (Four) Hours As Needed for Wheezing or Shortness of Air.  Dispense: 1 g; Refill: 11  -     fluticasone-salmeterol (Advair HFA) 230-21 MCG/ACT inhaler; Inhale 2 puffs 2 (Two) Times a Day.  Dispense: 1 each; Refill: 11    5. Obesity, Class III, BMI 40-49.9 (morbid obesity) ([facility])    6. Gastroesophageal reflux disease without esophagitis    7. Asthma-COPD overlap syndrome ([facility])  -     PAP Therapy  -     varenicline (CHANTIX) 1 MG tablet; Take 1 tablet by mouth 2 (Two) Times a Day.  Dispense: 60 tablet; Refill: 4  -     SARS-CoV-2 Antibody Profile, Nucleocapsid and Pipo; Future  -     montelukast (SINGULAIR) 10 MG tablet; Take 1 tablet by mouth Every Morning.  Dispense: 90 tablet; Refill: 3  -     albuterol sulfate  (90 Base) MCG/ACT inhaler; Inhale 2 puffs Every 4 (Four) Hours As Needed for Wheezing or Shortness of Air.  Dispense: 1 g; Refill: 11  -     fluticasone-salmeterol (Advair HFA) 230-21 MCG/ACT inhaler; Inhale 2 puffs 2 (Two) Times a Day.  Dispense: 1 each;  Refill: 11    8. Cough, unspecified type  -     PAP Therapy  -     varenicline (CHANTIX) 1 MG tablet; Take 1 tablet by mouth 2 (Two) Times a Day.  Dispense: 60 tablet; Refill: 4  -     SARS-CoV-2 Antibody Profile, Nucleocapsid and Pipo; Future  -     montelukast (SINGULAIR) 10 MG tablet; Take 1 tablet by mouth Every Morning.  Dispense: 90 tablet; Refill: 3  -     albuterol sulfate  (90 Base) MCG/ACT inhaler; Inhale 2 puffs Every 4 (Four) Hours As Needed for Wheezing or Shortness of Air.  Dispense: 1 g; Refill: 11  -     fluticasone-salmeterol (Advair HFA) 230-21 MCG/ACT inhaler; Inhale 2 puffs 2 (Two) Times a Day.  Dispense: 1 each; Refill: 11    9. Shortness of breath  -     PAP Therapy  -     varenicline (CHANTIX) 1 MG tablet; Take 1 tablet by mouth 2 (Two) Times a Day.  Dispense: 60 tablet; Refill: 4  -     SARS-CoV-2 Antibody Profile, Nucleocapsid and Pipo; Future  -     montelukast (SINGULAIR) 10 MG tablet; Take 1 tablet by mouth Every Morning.  Dispense: 90 tablet; Refill: 3  -     albuterol sulfate  (90 Base) MCG/ACT inhaler; Inhale 2 puffs Every 4 (Four) Hours As Needed for Wheezing or Shortness of Air.  Dispense: 1 g; Refill: 11  -     fluticasone-salmeterol (Advair HFA) 230-21 MCG/ACT inhaler; Inhale 2 puffs 2 (Two) Times a Day.  Dispense: 1 each; Refill: 11    10. MANI (obstructive sleep apnea)    11. Chronic fatigue  -     PAP Therapy  -     varenicline (CHANTIX) 1 MG tablet; Take 1 tablet by mouth 2 (Two) Times a Day.  Dispense: 60 tablet; Refill: 4  -     SARS-CoV-2 Antibody Profile, Nucleocapsid and Pipo; Future  -     montelukast (SINGULAIR) 10 MG tablet; Take 1 tablet by mouth Every Morning.  Dispense: 90 tablet; Refill: 3  -     albuterol sulfate  (90 Base) MCG/ACT inhaler; Inhale 2 puffs Every 4 (Four) Hours As Needed for Wheezing or Shortness of Air.  Dispense: 1 g; Refill: 11  -     fluticasone-salmeterol (Advair HFA) 230-21 MCG/ACT inhaler; Inhale 2 puffs 2 (Two) Times a  Day.  Dispense: 1 each; Refill: 11    12. Tobacco abuse  -     PAP Therapy  -     varenicline (CHANTIX) 1 MG tablet; Take 1 tablet by mouth 2 (Two) Times a Day.  Dispense: 60 tablet; Refill: 4  -     SARS-CoV-2 Antibody Profile, Nucleocapsid and Pipo; Future  -     montelukast (SINGULAIR) 10 MG tablet; Take 1 tablet by mouth Every Morning.  Dispense: 90 tablet; Refill: 3  -     albuterol sulfate  (90 Base) MCG/ACT inhaler; Inhale 2 puffs Every 4 (Four) Hours As Needed for Wheezing or Shortness of Air.  Dispense: 1 g; Refill: 11  -     fluticasone-salmeterol (Advair HFA) 230-21 MCG/ACT inhaler; Inhale 2 puffs 2 (Two) Times a Day.  Dispense: 1 each; Refill: 11    13. COVID-19 long hauler  -     PAP Therapy  -     varenicline (CHANTIX) 1 MG tablet; Take 1 tablet by mouth 2 (Two) Times a Day.  Dispense: 60 tablet; Refill: 4  -     SARS-CoV-2 Antibody Profile, Nucleocapsid and Pipo; Future  -     montelukast (SINGULAIR) 10 MG tablet; Take 1 tablet by mouth Every Morning.  Dispense: 90 tablet; Refill: 3  -     albuterol sulfate  (90 Base) MCG/ACT inhaler; Inhale 2 puffs Every 4 (Four) Hours As Needed for Wheezing or Shortness of Air.  Dispense: 1 g; Refill: 11  -     fluticasone-salmeterol (Advair HFA) 230-21 MCG/ACT inhaler; Inhale 2 puffs 2 (Two) Times a Day.  Dispense: 1 each; Refill: 11      Obstructive sleep apnea and obesity: Patient is willing to have a new CPAP machine.  I have ordered new AutoPap at 5 to 20 cm of water.  Sleep hygiene was discussed in detail.  Weight loss counseling was done.    COPD and asthma overlap: Continue with Advair 230/21 2 puffs twice daily.  Albuterol as needed.  Asthma exacerbation action plan was discussed in detail.  Allergic rhinitis: Continue with Singulair.      Smoking cessation counseling was done for more than 5 minutes.  She is willing to try Chantix.  Have sent the prescription for Chantix  She is due for flu shot, advised her to get flu shot as soon as  possible.  We do not have flu shot available in clinic today.  She is up-to-date on pneumonia.  She took 1 COVID-19 vaccine but had severe headache and was dizzy.  I will check SARS-CoV-2 antibody profile today.    Follow Up   Return in about 6 months (around 7/25/2023).  Patient was given instructions and counseling regarding her condition or for health maintenance advice. Please see specific information pulled into the AVS if appropriate.       Electronically signed by Gadiel Solis MD, 1/25/2023, 17:24 EST.

## 2023-01-30 ENCOUNTER — TELEPHONE (OUTPATIENT)
Dept: FAMILY MEDICINE CLINIC | Facility: CLINIC | Age: 58
End: 2023-01-30
Payer: COMMERCIAL

## 2023-01-30 DIAGNOSIS — I50.9 CONGESTIVE HEART FAILURE, UNSPECIFIED HF CHRONICITY, UNSPECIFIED HEART FAILURE TYPE: Primary | ICD-10-CM

## 2023-01-30 RX ORDER — FUROSEMIDE 40 MG/1
40 TABLET ORAL DAILY
Qty: 30 TABLET | Refills: 3 | Status: SHIPPED | OUTPATIENT
Start: 2023-01-30 | End: 2023-03-09 | Stop reason: SDUPTHER

## 2023-01-30 NOTE — TELEPHONE ENCOUNTER
Sorry, I thought that cardiology was prescribing that.  I sent a refill to [facility] pharmacy for her.

## 2023-01-30 NOTE — TELEPHONE ENCOUNTER
Patient states she did not get this from Cardio she got this from her old family doctor. She states she had talked to you previously about taking this over with the rest of her meds and you had advised her to call office when she needed a refill. She also wanted to let you know she is out and will not have any tabs for tomorrow.

## 2023-01-30 NOTE — TELEPHONE ENCOUNTER
Patient called and stated she is completely out of furosemide (LASIX) 40 MG tablet. Please advise if refill is appropriate.

## 2023-01-30 NOTE — TELEPHONE ENCOUNTER
I believe that cardiology is prescribing this medication for her congestive heart failure so she will need to contact cardiology for refill.

## 2023-02-28 ENCOUNTER — LAB (OUTPATIENT)
Dept: FAMILY MEDICINE CLINIC | Facility: CLINIC | Age: 58
End: 2023-02-28
Payer: COMMERCIAL

## 2023-02-28 DIAGNOSIS — E78.2 MIXED HYPERLIPIDEMIA: ICD-10-CM

## 2023-02-28 DIAGNOSIS — I10 ESSENTIAL HYPERTENSION: ICD-10-CM

## 2023-02-28 DIAGNOSIS — E87.6 HYPOKALEMIA: ICD-10-CM

## 2023-02-28 DIAGNOSIS — E11.9 TYPE 2 DIABETES MELLITUS WITHOUT COMPLICATION, WITHOUT LONG-TERM CURRENT USE OF INSULIN: ICD-10-CM

## 2023-02-28 LAB
ALBUMIN SERPL-MCNC: 4.1 G/DL (ref 3.5–5.2)
ALBUMIN UR-MCNC: <1.2 MG/DL
ALBUMIN/GLOB SERPL: 1.3 G/DL
ALP SERPL-CCNC: 121 U/L (ref 39–117)
ALT SERPL W P-5'-P-CCNC: 18 U/L (ref 1–33)
ANION GAP SERPL CALCULATED.3IONS-SCNC: 11.9 MMOL/L (ref 5–15)
AST SERPL-CCNC: 12 U/L (ref 1–32)
BILIRUB SERPL-MCNC: 0.4 MG/DL (ref 0–1.2)
BUN SERPL-MCNC: 15 MG/DL (ref 6–20)
BUN/CREAT SERPL: 11.9 (ref 7–25)
CALCIUM SPEC-SCNC: 10 MG/DL (ref 8.6–10.5)
CHLORIDE SERPL-SCNC: 101 MMOL/L (ref 98–107)
CHOLEST SERPL-MCNC: 160 MG/DL (ref 0–200)
CO2 SERPL-SCNC: 28.1 MMOL/L (ref 22–29)
CREAT SERPL-MCNC: 1.26 MG/DL (ref 0.57–1)
CREAT UR-MCNC: 246.8 MG/DL
EGFRCR SERPLBLD CKD-EPI 2021: 50.2 ML/MIN/1.73
GLOBULIN UR ELPH-MCNC: 3.1 GM/DL
GLUCOSE SERPL-MCNC: 101 MG/DL (ref 65–99)
HBA1C MFR BLD: 6.4 % (ref 4.8–5.6)
HDLC SERPL-MCNC: 28 MG/DL (ref 40–60)
LDLC SERPL CALC-MCNC: 81 MG/DL (ref 0–100)
LDLC/HDLC SERPL: 2.49 {RATIO}
MICROALBUMIN/CREAT UR: NORMAL MG/G{CREAT}
POTASSIUM SERPL-SCNC: 3.3 MMOL/L (ref 3.5–5.2)
PROT SERPL-MCNC: 7.2 G/DL (ref 6–8.5)
SODIUM SERPL-SCNC: 141 MMOL/L (ref 136–145)
TRIGL SERPL-MCNC: 312 MG/DL (ref 0–150)
VLDLC SERPL-MCNC: 51 MG/DL (ref 5–40)

## 2023-02-28 PROCEDURE — 82570 ASSAY OF URINE CREATININE: CPT | Performed by: NURSE PRACTITIONER

## 2023-02-28 PROCEDURE — 83036 HEMOGLOBIN GLYCOSYLATED A1C: CPT | Performed by: NURSE PRACTITIONER

## 2023-02-28 PROCEDURE — 82043 UR ALBUMIN QUANTITATIVE: CPT | Performed by: NURSE PRACTITIONER

## 2023-02-28 PROCEDURE — 80061 LIPID PANEL: CPT | Performed by: NURSE PRACTITIONER

## 2023-02-28 PROCEDURE — 80053 COMPREHEN METABOLIC PANEL: CPT | Performed by: NURSE PRACTITIONER

## 2023-03-08 NOTE — PROGRESS NOTES
Chief Complaint  Hypertension, Hyperlipidemia, and Diabetes    Subjective          Dang Hunt, 57 y.o. female presents to [facility]  History of Present Illness   Patient presents today for 3-month follow-up on diabetes.    Diabetes; her most recent A1C was 6.4% which has decreased from 6.8%. She had been receiving Steroid shots from pain management but has not had those in 4 months so she is hopeful it ill decrease again. She has been trying to decrease her Pepsi intake and increasing her water intake. She admits to having episodes of shakiness and weakness on occasion when she is out shopping or at a store and needing a snack to feel better.     Depression; She is wondering if the Effexor is causing her to have profuse sweating. She states she will start doing any sort of cleaning and starts sweating with sweat running down her forehead and she gets excessively hot. She has read that some of the side effects of Effexor may be causing this, so she is concerned about that and wants to inquire about possibly changing medications.     Current 1/ 2 PPD smoker or less. She has had some newly identified lung nodules. She is due to have a follow-up imaging study in April. She was attempting smoking cessation with Chantix and was having increased nausea therefore she stopped taking it. She is considering starting it again.     She also is concerned about some imaging she had in December of 2021 in [facility] that showed a benign cyst on her left kidney that was 1.5 cm and also a benign rt adrenal adenoma that measured 1.2 cm. She states she is wondering if there Adrenal adenoma could be causing all the fluctuations in her hormones and moods as she feels like she is all over the place at times.     She is post-menopausal and has had a Hysterectomy. She does not feel like her profuse sweating feels like hot flashes.    She has also mentioned her middle finger on both hands draw up  "occasionally causing significant pain / discomfort and then having to pull the fingers open at times.      Tobacco Use: High Risk   • Smoking Tobacco Use: Every Day   • Smokeless Tobacco Use: Never   • Passive Exposure: Not on file      Objective   Vital Signs:   /76 (BP Location: Left arm, Patient Position: Sitting, Cuff Size: Adult)   Pulse 67   Temp 98.3 °F (36.8 °C)   Ht 165.1 cm (65\")   Wt 114 kg (251 lb)   SpO2 97%   BMI 41.77 kg/m²       Current Outpatient Medications:   •  albuterol sulfate  (90 Base) MCG/ACT inhaler, Inhale 2 puffs Every 4 (Four) Hours As Needed for Wheezing or Shortness of Air., Disp: 1 g, Rfl: 11  •  atorvastatin (LIPITOR) 40 MG tablet, Take 1 tablet by mouth Every Night., Disp: 90 tablet, Rfl: 1  •  busPIRone (BUSPAR) 15 MG tablet, Take 1 tablet by mouth 3 (Three) Times a Day As Needed (As needed)., Disp: 90 tablet, Rfl: 2  •  chlorthalidone (HYGROTON) 25 MG tablet, Take 1 tablet by mouth Daily., Disp: 90 tablet, Rfl: 1  •  cyclobenzaprine (FLEXERIL) 10 MG tablet, Take 1 tablet by mouth 3 (Three) Times a Day As Needed for Muscle Spasms., Disp: , Rfl:   •  fluticasone-salmeterol (Advair HFA) 230-21 MCG/ACT inhaler, Inhale 2 puffs 2 (Two) Times a Day., Disp: 1 each, Rfl: 11  •  furosemide (LASIX) 40 MG tablet, Take 1 tablet by mouth Daily. 1/2-1 tab po qd depending on weight, Disp: 30 tablet, Rfl: 3  •  gabapentin (NEURONTIN) 100 MG capsule, Every 12 (Twelve) Hours., Disp: , Rfl:   •  ketotifen (ZADITOR) 0.025 % ophthalmic solution, Administer 1 drop to both eyes 2 (Two) Times a Day., Disp: 10 mL, Rfl: 5  •  metoprolol succinate XL (TOPROL-XL) 50 MG 24 hr tablet, Take 1 tablet by mouth Daily., Disp: 90 tablet, Rfl: 1  •  montelukast (SINGULAIR) 10 MG tablet, Take 1 tablet by mouth Every Morning., Disp: 90 tablet, Rfl: 3  •  oxyCODONE-acetaminophen (PERCOCET) 5-325 MG per tablet, Take 1 tablet by mouth Every 8 (Eight) Hours As Needed., Disp: , Rfl:   •  potassium chloride " 10 MEQ CR tablet, Take 3 tablets by mouth Daily., Disp: 90 tablet, Rfl: 2  •  ubrogepant 100 MG tablet, Take 1 tablet by mouth 1 (One) Time As Needed (migraine)., Disp: 8 tablet, Rfl: 2  •  venlafaxine XR (EFFEXOR-XR) 150 MG 24 hr capsule, Take 1 capsule by mouth Daily., Disp: 90 capsule, Rfl: 1  •  vitamin D3 125 MCG (5000 UT) capsule capsule, Take 1 capsule by mouth Daily., Disp: 90 capsule, Rfl: 1  •  ezetimibe (Zetia) 10 MG tablet, Take 1 tablet by mouth Daily., Disp: 90 tablet, Rfl: 1  •  fluticasone-salmeterol (Advair Diskus) 500-50 MCG/DOSE DISKUS, Inhale 1 puff 2 (Two) Times a Day for 30 days. Rinse mouth out after each use, Disp: 1 each, Rfl: 11  •  ondansetron ODT (ZOFRAN-ODT) 8 MG disintegrating tablet, Place 1 tablet on the tongue Every 8 (Eight) Hours As Needed for Nausea or Vomiting., Disp: 30 tablet, Rfl: 2  •  varenicline (Chantix) 0.5 MG tablet, Take 1 tablet by mouth 2 (Two) Times a Day., Disp: 30 tablet, Rfl: 0  •  varenicline (CHANTIX) 1 MG tablet, Take 1 tablet by mouth 2 (Two) Times a Day., Disp: 60 tablet, Rfl: 4   Past Medical History:   Diagnosis Date   • Anxiety    • Arthritis    • Asthma    • CHF (congestive heart failure) ([facility])    • Chronic diastolic CHF (congestive heart failure) ([facility]) 06/10/2022   • Colitis    • COPD (chronic obstructive pulmonary disease) ([facility])    • Depression    • Dysphagia    • Edema    • Fatigue    • GERD (gastroesophageal reflux disease)    • History of acute pancreatitis    • History of histoplasmosis    • HTN (hypertension)    • Hyperlipidemia    • Inflammatory bowel disease    • Irritable bowel syndrome    • Light headed    • Low back pain    • Migraines    • Multiple joint pain    • On home oxygen therapy     2 L AT NIGHT   • MANI (obstructive sleep apnea)     WEARS CPAP   • Pancreatitis    • RLS (restless legs syndrome)    • Type 2 diabetes mellitus without complication, without long-term current use of insulin ([facility]) 01/06/2023      Physical Exam  Vitals  reviewed.   Constitutional:       Appearance: Normal appearance. She is well-developed. She is morbidly obese.   Neck:      Thyroid: No thyroid mass, thyromegaly or thyroid tenderness.   Cardiovascular:      Rate and Rhythm: Normal rate and regular rhythm.      Heart sounds: Normal heart sounds. No murmur heard.    No friction rub. No gallop.   Pulmonary:      Effort: Pulmonary effort is normal.      Breath sounds: Normal breath sounds. No wheezing or rhonchi.   Musculoskeletal:      Cervical back: Normal range of motion.   Lymphadenopathy:      Cervical: No cervical adenopathy.   Skin:     General: Skin is warm and dry.   Neurological:      Mental Status: She is alert and oriented to person, place, and time.      Cranial Nerves: No cranial nerve deficit.   Psychiatric:         Attention and Perception: Attention normal.         Mood and Affect: Mood and affect normal.         Speech: Speech normal.         Behavior: Behavior normal.         Thought Content: Thought content normal. Thought content does not include homicidal or suicidal ideation.         Judgment: Judgment normal.        Result Review :   {The following data was reviewed by ROSHNI Varma             Common Labs   Common labs    Common Labs 12/1/22 12/1/22 12/1/22 1/6/23 2/28/23 2/28/23 2/28/23 2/28/23    1555 1555 1555  0840 0840 0840 0840   Glucose   97 66   101 (A)    BUN   15 13   15    Creatinine   1.44 (A) 1.02 (A)   1.26 (A)    Sodium   141 142   141    Potassium   3.0 (A) 3.2 (A)   3.3 (A)    Chloride   96 (A) 101   101    Calcium   9.4 9.3   10.0    Albumin   4.10    4.1    Total Bilirubin   0.2    0.4    Alkaline Phosphatase   113    121 (A)    AST (SGOT)   18    12    ALT (SGPT)   19    18    WBC 8.37          Hemoglobin 13.2          Hematocrit 41.0          Platelets 260          Total Cholesterol        160   Triglycerides        312 (A)   HDL Cholesterol        28 (A)   LDL Cholesterol         81   Hemoglobin A1C  6.80 (A)     6.40 (A)     Microalbumin, Urine     <1.2      (A) Abnormal value              Assessment and Plan    Diagnoses and all orders for this visit:    1. Type 2 diabetes mellitus without complication, without long-term current use of insulin (HCC) (Primary)  Assessment & Plan:  Diabetes is improving per most recent Hgb A1C of 6.4%..   Dietary recommendations for ADA diet.  Check Blood Sugar anytime symptoms of lightheadedness or shakiness occurs.  Diabetes will be reassessed at next routine visit..      2. Congestive heart failure, unspecified HF chronicity, unspecified heart failure type (HCC)  -     furosemide (LASIX) 40 MG tablet; Take 1 tablet by mouth Daily. 1/2-1 tab po qd depending on weight  Dispense: 30 tablet; Refill: 3    3. Persistent depressive disorder  Assessment & Plan:  Patient's depression is recurrent and is moderate without psychosis. Their depression is currently active and the condition is fluctuating depending on the day.. This will be reassessed at the next regular appointment. F/U as described:patient will continue current medication therapy.      Orders:  -     venlafaxine XR (EFFEXOR-XR) 150 MG 24 hr capsule; Take 1 capsule by mouth Daily.  Dispense: 90 capsule; Refill: 1    4. Nausea  Comments:  I will prescribe Zofran for management of Nausea while taking Chantix.  Orders:  -     ondansetron ODT (ZOFRAN-ODT) 8 MG disintegrating tablet; Place 1 tablet on the tongue Every 8 (Eight) Hours As Needed for Nausea or Vomiting.  Dispense: 30 tablet; Refill: 2    5. Adrenal adenoma, right  Assessment & Plan:  We will get a CT of the abdomen due to her history of adrenal adenoma to reevaluate.    Orders:  -     CT Abdomen Pelvis With Contrast; Future    6. Cyst of left kidney  Assessment & Plan:  CT of abdomen to reevaluate cyst of left kidney.    Orders:  -     CT Abdomen Pelvis With Contrast; Future    7. Mixed hyperlipidemia  Assessment & Plan:  Lipid abnormalities are worsening.  Nutritional  counseling was provided. and I will add Zetia 10mg daily as adjunct therapy to Atorvastatin 40mg daily.  Lipids will be reassessed in 3 months.    Orders:  -     atorvastatin (LIPITOR) 40 MG tablet; Take 1 tablet by mouth Every Night.  Dispense: 90 tablet; Refill: 1  -     ezetimibe (Zetia) 10 MG tablet; Take 1 tablet by mouth Daily.  Dispense: 90 tablet; Refill: 1    8. Bilateral hand pain  Assessment & Plan:  I will order an x-ray for her hands and also obtain blood work to review rheumatoid or inflammatory markers.  We discussed not using NSAIDs due to low GFR.    Orders:  -     Rheumatoid Factor  -     SILVANA With / DsDNA, RNP, Sjogrens A / B, Smith  -     Sedimentation Rate  -     C-reactive Protein  -     XR Hand 3+ View Left; Future  -     XR Hand 3+ View Right; Future    9. Hypokalemia  Comments:  I will obtain metabolic panel today for further review and assessment.  Assessment & Plan:  We will recheck BMP today monitor potassium and kidney levels.    Orders:  -     Basic Metabolic Panel    10. Vitamin D deficiency  -     vitamin D3 125 MCG (5000 UT) capsule capsule; Take 1 capsule by mouth Daily.  Dispense: 90 capsule; Refill: 1    11. Hot flashes  Assessment & Plan:  I recommended that she try a over-the-counter black cohosh daily and vitamin E 400 units twice daily to see if this helps with her hot flashes.      12. Essential hypertension  Assessment & Plan:  Hypertension is improving with treatment.  Continue current treatment regimen.  Weight loss.  Stop smoking.  Continue current medications.  Blood pressure will be reassessed in 3 months.    Orders:  -     chlorthalidone (HYGROTON) 25 MG tablet; Take 1 tablet by mouth Daily.  Dispense: 90 tablet; Refill: 1  -     metoprolol succinate XL (TOPROL-XL) 50 MG 24 hr tablet; Take 1 tablet by mouth Daily.  Dispense: 90 tablet; Refill: 1    13. Allergic rhinoconjunctivitis of both eyes  Assessment & Plan:  Stable with eyedrops, will continue current dose, refill  sent.    Orders:  -     ketotifen (ZADITOR) 0.025 % ophthalmic solution; Administer 1 drop to both eyes 2 (Two) Times a Day.  Dispense: 10 mL; Refill: 5    14. Cigarette nicotine dependence with nicotine-induced disorder  Assessment & Plan:  Tobacco use is unchanged.  Smoking cessation counseling was provided.  Pharmacotherapy was prescribed as ordered.  We discussed decreasing Chantix dose 0.5 mg twice daily for 2 weeks before increasing to the full dose.  Discussed to take with food.  Advised she can try taking Zofran if she feels nauseous.  Tobacco use will be reassessed in 3 months.    Orders:  -     varenicline (Chantix) 0.5 MG tablet; Take 1 tablet by mouth 2 (Two) Times a Day.  Dispense: 30 tablet; Refill: 0    15. Anxiety  Assessment & Plan:  Anxiety is stable with BuSpar as needed.  Also takes Effexor daily.    Orders:  -     busPIRone (BUSPAR) 15 MG tablet; Take 1 tablet by mouth 3 (Three) Times a Day As Needed (As needed).  Dispense: 90 tablet; Refill: 2    I spent 45 minutes caring for Dang on this date of service. This time includes time spent by me in the following activities:preparing for the visit, reviewing tests, performing a medically appropriate examination and/or evaluation , counseling and educating the patient/family/caregiver, ordering medications, tests, or procedures, referring and communicating with other health care professionals  and documenting information in the medical record  Follow Up   Return in about 3 months (around 6/9/2023) for 30 min apt for complex pt DM, HLD.  Patient was given instructions and counseling regarding her condition or for health maintenance advice. Please see specific information pulled into the AVS if appropriate.     Parts of this note are electronic transcriptions/translations of spoken language to printed text using the Dragon Dictation system.    I reviewed all the information by my student April Galloway, ROSHNI student, and I attest that all  information is correct.    Maria De Jesus Narayanan, APRN  03/09/2023

## 2023-03-09 ENCOUNTER — OFFICE VISIT (OUTPATIENT)
Dept: FAMILY MEDICINE CLINIC | Facility: CLINIC | Age: 58
End: 2023-03-09
Payer: COMMERCIAL

## 2023-03-09 VITALS
OXYGEN SATURATION: 97 % | DIASTOLIC BLOOD PRESSURE: 76 MMHG | HEART RATE: 67 BPM | BODY MASS INDEX: 41.82 KG/M2 | SYSTOLIC BLOOD PRESSURE: 135 MMHG | WEIGHT: 251 LBS | TEMPERATURE: 98.3 F | HEIGHT: 65 IN

## 2023-03-09 DIAGNOSIS — H10.13 ALLERGIC RHINOCONJUNCTIVITIS OF BOTH EYES: ICD-10-CM

## 2023-03-09 DIAGNOSIS — M79.641 BILATERAL HAND PAIN: ICD-10-CM

## 2023-03-09 DIAGNOSIS — E11.9 TYPE 2 DIABETES MELLITUS WITHOUT COMPLICATION, WITHOUT LONG-TERM CURRENT USE OF INSULIN: Primary | ICD-10-CM

## 2023-03-09 DIAGNOSIS — R11.0 NAUSEA: ICD-10-CM

## 2023-03-09 DIAGNOSIS — I50.9 CONGESTIVE HEART FAILURE, UNSPECIFIED HF CHRONICITY, UNSPECIFIED HEART FAILURE TYPE: ICD-10-CM

## 2023-03-09 DIAGNOSIS — M10.9 ACUTE GOUT OF HAND, UNSPECIFIED CAUSE, UNSPECIFIED LATERALITY: ICD-10-CM

## 2023-03-09 DIAGNOSIS — E78.2 MIXED HYPERLIPIDEMIA: ICD-10-CM

## 2023-03-09 DIAGNOSIS — N28.1 CYST OF LEFT KIDNEY: ICD-10-CM

## 2023-03-09 DIAGNOSIS — E87.6 HYPOKALEMIA: ICD-10-CM

## 2023-03-09 DIAGNOSIS — F41.9 ANXIETY: ICD-10-CM

## 2023-03-09 DIAGNOSIS — R23.2 HOT FLASHES: ICD-10-CM

## 2023-03-09 DIAGNOSIS — F34.1 PERSISTENT DEPRESSIVE DISORDER: ICD-10-CM

## 2023-03-09 DIAGNOSIS — D35.01 ADRENAL ADENOMA, RIGHT: ICD-10-CM

## 2023-03-09 DIAGNOSIS — M79.642 BILATERAL HAND PAIN: ICD-10-CM

## 2023-03-09 DIAGNOSIS — J30.9 ALLERGIC RHINOCONJUNCTIVITIS OF BOTH EYES: ICD-10-CM

## 2023-03-09 DIAGNOSIS — F17.219 CIGARETTE NICOTINE DEPENDENCE WITH NICOTINE-INDUCED DISORDER: ICD-10-CM

## 2023-03-09 DIAGNOSIS — I10 ESSENTIAL HYPERTENSION: ICD-10-CM

## 2023-03-09 DIAGNOSIS — E55.9 VITAMIN D DEFICIENCY: ICD-10-CM

## 2023-03-09 LAB
ANION GAP SERPL CALCULATED.3IONS-SCNC: 11 MMOL/L (ref 5–15)
BUN SERPL-MCNC: 17 MG/DL (ref 6–20)
BUN/CREAT SERPL: 14.5 (ref 7–25)
CALCIUM SPEC-SCNC: 9.6 MG/DL (ref 8.6–10.5)
CHLORIDE SERPL-SCNC: 99 MMOL/L (ref 98–107)
CHROMATIN AB SERPL-ACNC: <10 IU/ML (ref 0–14)
CO2 SERPL-SCNC: 29 MMOL/L (ref 22–29)
CREAT SERPL-MCNC: 1.17 MG/DL (ref 0.57–1)
CRP SERPL-MCNC: 0.76 MG/DL (ref 0–0.5)
EGFRCR SERPLBLD CKD-EPI 2021: 54.9 ML/MIN/1.73
ERYTHROCYTE [SEDIMENTATION RATE] IN BLOOD: 31 MM/HR (ref 0–30)
GLUCOSE SERPL-MCNC: 80 MG/DL (ref 65–99)
POTASSIUM SERPL-SCNC: 3.5 MMOL/L (ref 3.5–5.2)
SODIUM SERPL-SCNC: 139 MMOL/L (ref 136–145)

## 2023-03-09 PROCEDURE — 80048 BASIC METABOLIC PNL TOTAL CA: CPT | Performed by: NURSE PRACTITIONER

## 2023-03-09 PROCEDURE — 86038 ANTINUCLEAR ANTIBODIES: CPT | Performed by: NURSE PRACTITIONER

## 2023-03-09 PROCEDURE — 85652 RBC SED RATE AUTOMATED: CPT | Performed by: NURSE PRACTITIONER

## 2023-03-09 PROCEDURE — 3078F DIAST BP <80 MM HG: CPT | Performed by: NURSE PRACTITIONER

## 2023-03-09 PROCEDURE — 3044F HG A1C LEVEL LT 7.0%: CPT | Performed by: NURSE PRACTITIONER

## 2023-03-09 PROCEDURE — 1159F MED LIST DOCD IN RCRD: CPT | Performed by: NURSE PRACTITIONER

## 2023-03-09 PROCEDURE — 1160F RVW MEDS BY RX/DR IN RCRD: CPT | Performed by: NURSE PRACTITIONER

## 2023-03-09 PROCEDURE — 86431 RHEUMATOID FACTOR QUANT: CPT | Performed by: NURSE PRACTITIONER

## 2023-03-09 PROCEDURE — 99215 OFFICE O/P EST HI 40 MIN: CPT | Performed by: NURSE PRACTITIONER

## 2023-03-09 PROCEDURE — 84550 ASSAY OF BLOOD/URIC ACID: CPT | Performed by: NURSE PRACTITIONER

## 2023-03-09 PROCEDURE — 3075F SYST BP GE 130 - 139MM HG: CPT | Performed by: NURSE PRACTITIONER

## 2023-03-09 PROCEDURE — 86140 C-REACTIVE PROTEIN: CPT | Performed by: NURSE PRACTITIONER

## 2023-03-09 RX ORDER — EZETIMIBE 10 MG/1
10 TABLET ORAL DAILY
Qty: 90 TABLET | Refills: 1 | Status: SHIPPED | OUTPATIENT
Start: 2023-03-09

## 2023-03-09 RX ORDER — VENLAFAXINE HYDROCHLORIDE 150 MG/1
150 CAPSULE, EXTENDED RELEASE ORAL DAILY
Qty: 90 CAPSULE | Refills: 1 | Status: SHIPPED | OUTPATIENT
Start: 2023-03-09

## 2023-03-09 RX ORDER — BUSPIRONE HYDROCHLORIDE 15 MG/1
15 TABLET ORAL 3 TIMES DAILY PRN
Qty: 90 TABLET | Refills: 2 | Status: SHIPPED | OUTPATIENT
Start: 2023-03-09

## 2023-03-09 RX ORDER — FLUTICASONE PROPIONATE AND SALMETEROL XINAFOATE 230; 21 UG/1; UG/1
2 AEROSOL, METERED RESPIRATORY (INHALATION) 2 TIMES DAILY
COMMUNITY
End: 2023-03-09

## 2023-03-09 RX ORDER — CHLORTHALIDONE 25 MG/1
25 TABLET ORAL DAILY
Qty: 90 TABLET | Refills: 1 | Status: SHIPPED | OUTPATIENT
Start: 2023-03-09

## 2023-03-09 RX ORDER — ONDANSETRON 8 MG/1
8 TABLET, ORALLY DISINTEGRATING ORAL EVERY 8 HOURS PRN
Qty: 30 TABLET | Refills: 2 | Status: SHIPPED | OUTPATIENT
Start: 2023-03-09

## 2023-03-09 RX ORDER — METOPROLOL SUCCINATE 50 MG/1
50 TABLET, EXTENDED RELEASE ORAL DAILY
Qty: 90 TABLET | Refills: 1 | Status: SHIPPED | OUTPATIENT
Start: 2023-03-09

## 2023-03-09 RX ORDER — VARENICLINE TARTRATE 0.5 MG/1
0.5 TABLET, FILM COATED ORAL 2 TIMES DAILY
Qty: 30 TABLET | Refills: 0 | Status: SHIPPED | OUTPATIENT
Start: 2023-03-09

## 2023-03-09 RX ORDER — ATORVASTATIN CALCIUM 40 MG/1
40 TABLET, FILM COATED ORAL NIGHTLY
Qty: 90 TABLET | Refills: 1 | Status: SHIPPED | OUTPATIENT
Start: 2023-03-09

## 2023-03-09 RX ORDER — KETOTIFEN FUMARATE 0.35 MG/ML
1 SOLUTION/ DROPS OPHTHALMIC 2 TIMES DAILY
Qty: 10 ML | Refills: 5 | Status: SHIPPED | OUTPATIENT
Start: 2023-03-09

## 2023-03-09 RX ORDER — FUROSEMIDE 40 MG/1
40 TABLET ORAL DAILY
Qty: 30 TABLET | Refills: 3 | Status: SHIPPED | OUTPATIENT
Start: 2023-03-09

## 2023-03-09 NOTE — ASSESSMENT & PLAN NOTE
Hypertension is improving with treatment.  Continue current treatment regimen.  Weight loss.  Stop smoking.  Continue current medications.  Blood pressure will be reassessed in 3 months.

## 2023-03-09 NOTE — ASSESSMENT & PLAN NOTE
Patient's depression is recurrent and is moderate without psychosis. Their depression is currently active and the condition is fluctuating depending on the day.. This will be reassessed at the next regular appointment. F/U as described:patient will continue current medication therapy.

## 2023-03-09 NOTE — ASSESSMENT & PLAN NOTE
Diabetes is improving per most recent Hgb A1C of 6.4%..   Dietary recommendations for ADA diet.  Check Blood Sugar anytime symptoms of lightheadedness or shakiness occurs.  Diabetes will be reassessed at next routine visit..

## 2023-03-09 NOTE — ASSESSMENT & PLAN NOTE
Tobacco use is unchanged.  Smoking cessation counseling was provided.  Pharmacotherapy was prescribed as ordered.  We discussed decreasing Chantix dose 0.5 mg twice daily for 2 weeks before increasing to the full dose.  Discussed to take with food.  Advised she can try taking Zofran if she feels nauseous.  Tobacco use will be reassessed in 3 months.

## 2023-03-09 NOTE — ASSESSMENT & PLAN NOTE
I will order an x-ray for her hands and also obtain blood work to review rheumatoid or inflammatory markers.  We discussed not using NSAIDs due to low GFR.

## 2023-03-09 NOTE — ASSESSMENT & PLAN NOTE
I recommended that she try a over-the-counter black cohosh daily and vitamin E 400 units twice daily to see if this helps with her hot flashes.

## 2023-03-09 NOTE — ASSESSMENT & PLAN NOTE
Lipid abnormalities are worsening.  Nutritional counseling was provided. and I will add Zetia 10mg daily as adjunct therapy to Atorvastatin 40mg daily.  Lipids will be reassessed in 3 months.

## 2023-03-10 LAB — URATE SERPL-MCNC: 9.5 MG/DL (ref 2.4–5.7)

## 2023-03-10 RX ORDER — PREDNISONE 20 MG/1
TABLET ORAL
Qty: 9 TABLET | Refills: 0 | Status: SHIPPED | OUTPATIENT
Start: 2023-03-10 | End: 2023-03-15

## 2023-03-10 RX ORDER — COLCHICINE 0.6 MG/1
TABLET ORAL
Qty: 3 TABLET | Refills: 0 | Status: SHIPPED | OUTPATIENT
Start: 2023-03-10

## 2023-03-11 LAB — ANA SER QL: NEGATIVE

## 2023-04-03 ENCOUNTER — LAB (OUTPATIENT)
Dept: LAB | Facility: HOSPITAL | Age: 58
End: 2023-04-03
Payer: COMMERCIAL

## 2023-04-03 ENCOUNTER — HOSPITAL ENCOUNTER (OUTPATIENT)
Dept: GENERAL RADIOLOGY | Facility: HOSPITAL | Age: 58
Discharge: HOME | End: 2023-04-03
Payer: COMMERCIAL

## 2023-04-03 DIAGNOSIS — U09.9 COVID-19 LONG HAULER: ICD-10-CM

## 2023-04-03 DIAGNOSIS — J44.9 ASTHMA-COPD OVERLAP SYNDROME: ICD-10-CM

## 2023-04-03 DIAGNOSIS — M79.642 BILATERAL HAND PAIN: ICD-10-CM

## 2023-04-03 DIAGNOSIS — R05.9 COUGH, UNSPECIFIED TYPE: ICD-10-CM

## 2023-04-03 DIAGNOSIS — J34.9 PARANASAL SINUS DISEASE: ICD-10-CM

## 2023-04-03 DIAGNOSIS — R06.02 SHORTNESS OF BREATH: ICD-10-CM

## 2023-04-03 DIAGNOSIS — J30.9 ALLERGIC RHINITIS, UNSPECIFIED SEASONALITY, UNSPECIFIED TRIGGER: ICD-10-CM

## 2023-04-03 DIAGNOSIS — R53.82 CHRONIC FATIGUE: ICD-10-CM

## 2023-04-03 DIAGNOSIS — Z72.0 TOBACCO ABUSE: ICD-10-CM

## 2023-04-03 DIAGNOSIS — M79.641 BILATERAL HAND PAIN: ICD-10-CM

## 2023-04-03 PROCEDURE — 86769 SARS-COV-2 COVID-19 ANTIBODY: CPT

## 2023-04-03 PROCEDURE — 73130 X-RAY EXAM OF HAND: CPT

## 2023-04-03 PROCEDURE — 36415 COLL VENOUS BLD VENIPUNCTURE: CPT

## 2023-04-03 PROCEDURE — C9803 HOPD COVID-19 SPEC COLLECT: HCPCS

## 2023-04-04 ENCOUNTER — HOSPITAL ENCOUNTER (OUTPATIENT)
Dept: CT IMAGING | Facility: HOSPITAL | Age: 58
Discharge: HOME | End: 2023-04-04
Payer: COMMERCIAL

## 2023-04-04 DIAGNOSIS — N28.1 CYST OF LEFT KIDNEY: ICD-10-CM

## 2023-04-04 DIAGNOSIS — D35.01 ADRENAL ADENOMA, RIGHT: ICD-10-CM

## 2023-04-04 DIAGNOSIS — R91.8 LUNG NODULES: ICD-10-CM

## 2023-04-04 PROCEDURE — 71250 CT THORAX DX C-: CPT

## 2023-04-04 PROCEDURE — 74177 CT ABD & PELVIS W/CONTRAST: CPT

## 2023-04-04 PROCEDURE — 25510000001 IOPAMIDOL PER 1 ML: Performed by: NURSE PRACTITIONER

## 2023-04-04 RX ADMIN — IOPAMIDOL 100 ML: 755 INJECTION, SOLUTION INTRAVENOUS at 11:22

## 2023-04-05 DIAGNOSIS — R91.1 LUNG NODULE: Primary | ICD-10-CM

## 2023-04-05 LAB
SARS-COV-2 AB SERPL IA-ACNC: NORMAL U/ML
SARS-COV-2 AB SERPL IA-ACNC: NORMAL U/ML
SARS-COV-2 AB SERPL QL IA: POSITIVE
SARS-COV-2 AB SERPL-IMP: POSITIVE

## 2023-04-05 PROCEDURE — 87077 CULTURE AEROBIC IDENTIFY: CPT | Performed by: FAMILY MEDICINE

## 2023-04-05 PROCEDURE — 87086 URINE CULTURE/COLONY COUNT: CPT | Performed by: FAMILY MEDICINE

## 2023-04-05 PROCEDURE — 87186 SC STD MICRODIL/AGAR DIL: CPT | Performed by: FAMILY MEDICINE

## 2023-04-05 RX ORDER — PREDNISONE 20 MG/1
TABLET ORAL
Qty: 9 TABLET | Refills: 0 | Status: SHIPPED | OUTPATIENT
Start: 2023-04-05 | End: 2023-04-10

## 2023-04-07 ENCOUNTER — TELEPHONE (OUTPATIENT)
Dept: URGENT CARE | Facility: CLINIC | Age: 58
End: 2023-04-07
Payer: COMMERCIAL

## 2023-04-07 RX ORDER — GLIPIZIDE 2.5 MG/1
TABLET, EXTENDED RELEASE ORAL
Qty: 30 TABLET | Refills: 0 | Status: SHIPPED | OUTPATIENT
Start: 2023-04-07

## 2023-04-07 RX ORDER — BLOOD-GLUCOSE METER
KIT MISCELLANEOUS
Qty: 1 EACH | Refills: 0 | Status: SHIPPED | OUTPATIENT
Start: 2023-04-07

## 2023-04-07 NOTE — TELEPHONE ENCOUNTER
----- Message from ROSHNI Blue sent at 4/7/2023 12:21 PM EDT -----  Please call the patient regarding her abnormal result.    Please inform patient that her urine culture is positive for an organism known as E. coli.  E. coli is one of the most common bacteria that causes urinary tract infections.  At the time of her visit she was prescribed an antibiotic called ciprofloxacin.  This urine culture and sensitivity report shows that this antibiotic should clear her infection.    If the patient was to continue to have symptoms or other concerns persist she should follow-up with her primary care provider which is listed as Maria De Jesus Narayanan

## 2023-04-11 DIAGNOSIS — E87.6 HYPOKALEMIA: ICD-10-CM

## 2023-04-11 RX ORDER — POTASSIUM CHLORIDE 750 MG/1
TABLET, FILM COATED, EXTENDED RELEASE ORAL
Qty: 90 TABLET | Refills: 2 | Status: SHIPPED | OUTPATIENT
Start: 2023-04-11

## 2023-05-30 ENCOUNTER — OFFICE VISIT (OUTPATIENT)
Dept: FAMILY MEDICINE CLINIC | Facility: CLINIC | Age: 58
End: 2023-05-30

## 2023-05-30 VITALS
HEIGHT: 65 IN | HEART RATE: 71 BPM | WEIGHT: 252.4 LBS | DIASTOLIC BLOOD PRESSURE: 77 MMHG | TEMPERATURE: 98 F | OXYGEN SATURATION: 95 % | SYSTOLIC BLOOD PRESSURE: 126 MMHG | BODY MASS INDEX: 42.05 KG/M2

## 2023-05-30 DIAGNOSIS — E11.9 TYPE 2 DIABETES MELLITUS WITHOUT COMPLICATION, WITHOUT LONG-TERM CURRENT USE OF INSULIN: ICD-10-CM

## 2023-05-30 DIAGNOSIS — Z12.31 ENCOUNTER FOR SCREENING MAMMOGRAM FOR MALIGNANT NEOPLASM OF BREAST: ICD-10-CM

## 2023-05-30 DIAGNOSIS — M10.9 ACUTE GOUT OF MULTIPLE SITES, UNSPECIFIED CAUSE: Primary | ICD-10-CM

## 2023-05-30 PROBLEM — M47.814 THORACIC SPONDYLOSIS: Status: ACTIVE | Noted: 2023-04-03

## 2023-05-30 PROBLEM — K59.03 DRUG-INDUCED CONSTIPATION: Status: ACTIVE | Noted: 2023-04-03

## 2023-05-30 RX ORDER — PREDNISONE 20 MG/1
TABLET ORAL
Qty: 12 TABLET | Refills: 0 | Status: SHIPPED | OUTPATIENT
Start: 2023-05-30 | End: 2023-06-05

## 2023-05-30 RX ORDER — NALOXONE HYDROCHLORIDE 4 MG/.1ML
SPRAY NASAL
COMMUNITY

## 2023-05-30 NOTE — PROGRESS NOTES
"Chief Complaint  Gout    Subjective          Dang Hunt, 57 y.o. female presents to [facility]  History of Present Illness   Patient presents today for an acute visit.  She is complaining of possible gout.  She has had gout in the past that was treated with prednisone.  She is complaining of a burning pain in her hands and her feet.  She states that the covers hurt her to touch her feet.  She had had a previous elevated uric acid level.  She is not supposed to take NSAIDs due to her low GFR.  Rheumatoid factor and SILVANA were both negative.  She did have elevated sed rate and C-reactive protein.    She does have diabetes type 2, diet controlled.  She did have to take glipizide in the past while she was on steroids.  She states she does still have some glipizide at home to take if needed.    She states she had to stop taking Zetia because it caused her some dizziness.  She is taking atorvastatin daily.       Tobacco Use: High Risk   • Smoking Tobacco Use: Every Day   • Smokeless Tobacco Use: Never   • Passive Exposure: Not on file      Objective   Vital Signs:   /77   Pulse 71   Temp 98 °F (36.7 °C)   Ht 165.1 cm (65\")   Wt 114 kg (252 lb 6.4 oz)   SpO2 95%   BMI 42.00 kg/m²       Current Outpatient Medications:   •  albuterol sulfate  (90 Base) MCG/ACT inhaler, Inhale 2 puffs Every 4 (Four) Hours As Needed for Wheezing or Shortness of Air., Disp: 1 g, Rfl: 11  •  atorvastatin (LIPITOR) 40 MG tablet, Take 1 tablet by mouth Every Night., Disp: 90 tablet, Rfl: 1  •  busPIRone (BUSPAR) 15 MG tablet, Take 1 tablet by mouth 3 (Three) Times a Day As Needed (As needed)., Disp: 90 tablet, Rfl: 2  •  chlorthalidone (HYGROTON) 25 MG tablet, Take 1 tablet by mouth Daily., Disp: 90 tablet, Rfl: 1  •  colchicine 0.6 MG tablet, Take 2 tablets now and repeat 1 tablet in 1 hour, Disp: 3 tablet, Rfl: 0  •  fluticasone-salmeterol (Advair HFA) 230-21 MCG/ACT inhaler, Inhale 2 puffs " 2 (Two) Times a Day., Disp: 1 each, Rfl: 11  •  furosemide (LASIX) 40 MG tablet, Take 1 tablet by mouth Daily. 1/2-1 tab po qd depending on weight, Disp: 30 tablet, Rfl: 3  •  gabapentin (NEURONTIN) 100 MG capsule, Every 12 (Twelve) Hours., Disp: , Rfl:   •  glucose monitor monitoring kit, Check blood sugar twice daily., Disp: 1 each, Rfl: 0  •  ketotifen (ZADITOR) 0.025 % ophthalmic solution, Administer 1 drop to both eyes 2 (Two) Times a Day., Disp: 10 mL, Rfl: 5  •  metoprolol succinate XL (TOPROL-XL) 50 MG 24 hr tablet, Take 1 tablet by mouth Daily., Disp: 90 tablet, Rfl: 1  •  montelukast (SINGULAIR) 10 MG tablet, Take 1 tablet by mouth Every Morning., Disp: 90 tablet, Rfl: 3  •  oxyCODONE-acetaminophen (PERCOCET) 5-325 MG per tablet, Take 1 tablet by mouth Every 8 (Eight) Hours As Needed., Disp: , Rfl:   •  potassium chloride 10 MEQ CR tablet, TAKE THREE TABLETS BY MOUTH DAILY, Disp: 90 tablet, Rfl: 2  •  ubrogepant 100 MG tablet, Take 1 tablet by mouth 1 (One) Time As Needed (migraine)., Disp: 8 tablet, Rfl: 2  •  varenicline (Chantix) 0.5 MG tablet, Take 1 tablet by mouth 2 (Two) Times a Day., Disp: 30 tablet, Rfl: 0  •  varenicline (CHANTIX) 1 MG tablet, Take 1 tablet by mouth 2 (Two) Times a Day., Disp: 60 tablet, Rfl: 4  •  venlafaxine XR (EFFEXOR-XR) 150 MG 24 hr capsule, Take 1 capsule by mouth Daily., Disp: 90 capsule, Rfl: 1  •  vitamin D3 125 MCG (5000 UT) capsule capsule, Take 1 capsule by mouth Daily., Disp: 90 capsule, Rfl: 1  •  fluticasone-salmeterol (Advair Diskus) 500-50 MCG/DOSE DISKUS, Inhale 1 puff 2 (Two) Times a Day for 30 days. Rinse mouth out after each use, Disp: 1 each, Rfl: 11  •  naloxone (Narcan) 4 MG/0.1ML nasal spray, Narcan 4 mg/actuation nasal spray  Take 1 spray by nasal route as directed., Disp: , Rfl:   •  predniSONE (DELTASONE) 20 MG tablet, Take 3 tablets by mouth Daily for 2 days, THEN 2 tablets Daily for 2 days, THEN 1 tablet Daily for 2 days., Disp: 12 tablet, Rfl: 0    Past Medical History:   Diagnosis Date   • Anxiety    • Arthritis    • Asthma    • CHF (congestive heart failure)    • Chronic diastolic CHF (congestive heart failure) 06/10/2022   • Colitis    • COPD (chronic obstructive pulmonary disease)    • Depression    • Dysphagia    • Edema    • Fatigue    • GERD (gastroesophageal reflux disease)    • History of acute pancreatitis    • History of histoplasmosis    • HTN (hypertension)    • Hyperlipidemia    • Inflammatory bowel disease    • Irritable bowel syndrome    • Light headed    • Low back pain    • Migraines    • Multiple joint pain    • On home oxygen therapy     2 L AT NIGHT   • MANI (obstructive sleep apnea)     WEARS CPAP   • Pancreatitis    • RLS (restless legs syndrome)    • Type 2 diabetes mellitus without complication, without long-term current use of insulin 01/06/2023      Physical Exam  Vitals reviewed.   Constitutional:       Appearance: Normal appearance. She is well-developed. She is obese.   Neck:      Thyroid: No thyroid mass, thyromegaly or thyroid tenderness.   Cardiovascular:      Rate and Rhythm: Normal rate and regular rhythm.      Heart sounds: No murmur heard.    No friction rub. No gallop.   Pulmonary:      Effort: Pulmonary effort is normal.      Breath sounds: Normal breath sounds. No wheezing or rhonchi.   Musculoskeletal:      Right hand: Swelling, deformity and tenderness present.      Left hand: Swelling, deformity and tenderness present.   Lymphadenopathy:      Cervical: No cervical adenopathy.   Skin:     General: Skin is warm and dry.   Neurological:      Mental Status: She is alert and oriented to person, place, and time.      Cranial Nerves: No cranial nerve deficit.   Psychiatric:         Mood and Affect: Mood and affect normal.         Behavior: Behavior normal.         Thought Content: Thought content normal. Thought content does not include homicidal or suicidal ideation.         Judgment: Judgment normal.        Result Review  :   {The following data was reviewed by ROSHNI Varma    XR Hand 3+ View Left    Result Date: 4/3/2023    1. Mild osteoarthritic changes in the carpus, as above      Keven Connell M.D.       Electronically Signed and Approved By: Keven Connell M.D. on 4/03/2023 at 17:39             XR Hand 3+ View Right    Result Date: 4/3/2023    1. Mild osteoarthritic changes in the wrist and hand, as above      Keven Connell M.D.       Electronically Signed and Approved By: Keven Connell M.D. on 4/03/2023 at 17:40             CT Chest Without Contrast Diagnostic    Result Date: 4/5/2023   Stable basilar pulmonary nodules.  No new suspicious abnormality     KAJAL MARINELLI MD       Electronically Signed and Approved By: KAJAL MARINELLI MD on 4/05/2023 at 11:11             CT Abdomen Pelvis With Contrast    Result Date: 4/5/2023     Stable bilateral adrenal nodules compatible with adenomas.  No new significant abnormality     KAJAL MARINELLI MD       Electronically Signed and Approved By: KAJAL MARINELLI MD on 4/05/2023 at 11:24           Common Labs   Common labs        1/6/2023    14:43 2/28/2023    08:40 3/9/2023    11:15   Common Labs   Glucose 66   101   80     BUN 13   15   17     Creatinine 1.02   1.26   1.17     Sodium 142   141   139     Potassium 3.2   3.3   3.5     Chloride 101   101   99     Calcium 9.3   10.0   9.6     Albumin  4.1      Total Bilirubin  0.4      Alkaline Phosphatase  121      AST (SGOT)  12      ALT (SGPT)  18      Total Cholesterol  160      Triglycerides  312      HDL Cholesterol  28      LDL Cholesterol   81      Hemoglobin A1C  6.40      Microalbumin, Urine  <1.2      Uric Acid   9.5       RHEUMFACTOR     Component  Ref Range & Units 2 mo ago   Rheumatoid Factor Quantitative  0.0 - 14.0 IU/mL <10.0        CREACTIVE     Component  Ref Range & Units 2 mo ago  (3/9/23) 1 yr ago  (8/11/21) 1 yr ago  (8/10/21) 1 yr ago  (8/9/21) 1 yr ago  (8/8/21) 1 yr ago  (8/7/21) 1 yr ago  (8/6/21)    C-Reactive Protein  0.00 - 0.50 mg/dL 0.76 High   0.94 High   2.29 High   3.78 High   6.00 High   5.71 High   7.57 High        URACID   Uric Acid        3/9/2023    11:15   Common Labsle   Uric Acid 9.5       Component  Ref Range & Units 2 mo ago  (3/9/23) 1 yr ago  (8/11/21) 1 yr ago  (8/10/21) 1 yr ago  (8/9/21) 1 yr ago  (8/8/21) 1 yr ago  (8/7/21)   Sed Rate  0 - 30 mm/hr 31 High   17  25  27  31 High   30      Component  Ref Range & Units 2 mo ago   SILVANA Direct  Negative Negative             Assessment and Plan    Diagnoses and all orders for this visit:    1. Acute gout of multiple sites, unspecified cause (Primary)  -     predniSONE (DELTASONE) 20 MG tablet; Take 3 tablets by mouth Daily for 2 days, THEN 2 tablets Daily for 2 days, THEN 1 tablet Daily for 2 days.  Dispense: 12 tablet; Refill: 0    2. Type 2 diabetes mellitus without complication, without long-term current use of insulin    3. Encounter for screening mammogram for malignant neoplasm of breast  -     Mammo Screening Digital Tomosynthesis Bilateral With CAD; Future    I will treat her acute gout attack with prednisone Dosepak.  Advised her to monitor blood sugars at home and to take glipizide daily if needed.  We discussed possibly needing to start her on preventative gout medication.  She does have a follow-up on 6/12/2023 and we will discuss further at that time.    Follow Up   Return for Keep Scheduled Follow-up.  Patient was given instructions and counseling regarding her condition or for health maintenance advice. Please see specific information pulled into the AVS if appropriate.     Parts of this note are electronic transcriptions/translations of spoken language to printed text using the Dragon Dictation system.      Maria De Jesus Narayanan, APRN  05/30/2023

## 2023-06-08 DIAGNOSIS — M10.9 ACUTE GOUT OF MULTIPLE SITES, UNSPECIFIED CAUSE: ICD-10-CM

## 2023-06-08 RX ORDER — PREDNISONE 20 MG/1
TABLET ORAL
Qty: 12 TABLET | Refills: 0 | OUTPATIENT
Start: 2023-06-08

## 2023-06-08 RX ORDER — FLUTICASONE PROPIONATE AND SALMETEROL 500; 50 UG/1; UG/1
1 POWDER RESPIRATORY (INHALATION)
Qty: 1 EACH | Refills: 6 | Status: SHIPPED | OUTPATIENT
Start: 2023-06-08

## 2023-06-08 NOTE — TELEPHONE ENCOUNTER
Pt called stated she does not think the Advair 230/21 HFA was helping her as much as the Advair 500/50 diskus.   Pt requested to go back on Advair 500/50 diskus.

## 2023-07-19 ENCOUNTER — TELEPHONE (OUTPATIENT)
Dept: FAMILY MEDICINE CLINIC | Facility: CLINIC | Age: 58
End: 2023-07-19

## 2023-07-19 NOTE — TELEPHONE ENCOUNTER
Caller: Dang Hunt    Relationship: Self    Best call back number: [phone]     What orders are you requesting (i.e. lab or imaging): BLOOD WORK    In what timeframe would the patient need to come in: ASAP    Where will you receive your lab/imaging services: [facility]    Additional notes: PATIENT STATES SHE WOULD LIKE TO KNOW IF BLOOD WORK SHOULD BE ORDERED BEFORE HER 7/21/23 APPOINTMENT TO CHECK FOR GOUT, KIDNEY FUNCTION, AND ANYTHING ELSE DIPAK JOYNER FEELS WOULD BE NECESSARY. PATIENT STATES TO PLEASE CALL HER AND LET HER KNOW.

## 2023-07-20 PROCEDURE — 85025 COMPLETE CBC W/AUTO DIFF WBC: CPT | Performed by: NURSE PRACTITIONER

## 2023-07-20 PROCEDURE — 84443 ASSAY THYROID STIM HORMONE: CPT | Performed by: NURSE PRACTITIONER

## 2023-07-20 PROCEDURE — 80053 COMPREHEN METABOLIC PANEL: CPT | Performed by: NURSE PRACTITIONER

## 2023-07-20 PROCEDURE — 83036 HEMOGLOBIN GLYCOSYLATED A1C: CPT | Performed by: NURSE PRACTITIONER

## 2023-07-20 PROCEDURE — 80061 LIPID PANEL: CPT | Performed by: NURSE PRACTITIONER

## 2023-07-20 PROCEDURE — 84550 ASSAY OF BLOOD/URIC ACID: CPT | Performed by: NURSE PRACTITIONER

## 2023-07-20 PROCEDURE — 84439 ASSAY OF FREE THYROXINE: CPT | Performed by: NURSE PRACTITIONER

## 2023-07-21 PROBLEM — M1A.9XX0 CHRONIC GOUT WITHOUT TOPHUS: Status: ACTIVE | Noted: 2023-07-21

## 2023-07-21 PROBLEM — E66.813 CLASS 3 SEVERE OBESITY WITH SERIOUS COMORBIDITY AND BODY MASS INDEX (BMI) OF 40.0 TO 44.9 IN ADULT: Status: ACTIVE | Noted: 2017-01-13

## 2023-08-07 ENCOUNTER — TELEPHONE (OUTPATIENT)
Dept: FAMILY MEDICINE CLINIC | Facility: CLINIC | Age: 58
End: 2023-08-07

## 2023-08-07 DIAGNOSIS — M10.9 ACUTE GOUT OF MULTIPLE SITES, UNSPECIFIED CAUSE: Primary | ICD-10-CM

## 2023-08-07 RX ORDER — PREDNISONE 20 MG/1
TABLET ORAL
Qty: 9 TABLET | Refills: 0 | Status: SHIPPED | OUTPATIENT
Start: 2023-08-07 | End: 2023-08-11

## 2023-08-07 NOTE — TELEPHONE ENCOUNTER
Caller: Dang Hunt    Relationship: Self    Best call back number:     [phone]       What medication are you requesting: STEROIDS    What are your current symptoms: GOUT    How long have you been experiencing symptoms: COUPLE OF WEEKS    Have you had these symptoms before:    [x] Yes  [] No    Have you been treated for these symptoms before:   [x] Yes  [] No    If a prescription is needed, what is your preferred pharmacy and phone number: [facility] - [place], [place] - [address] - [phone]  - [phone] FX     Additional notes: LEFT BIG TOE, SWOLLEN AND PAINFUL    PLEASE CALL AND ADVISE WHEN SENT TO PHARMACY

## 2023-08-08 DIAGNOSIS — F41.9 ANXIETY: ICD-10-CM

## 2023-08-08 RX ORDER — BUSPIRONE HYDROCHLORIDE 15 MG/1
TABLET ORAL
Qty: 90 TABLET | Refills: 0 | Status: SHIPPED | OUTPATIENT
Start: 2023-08-08

## 2023-08-14 ENCOUNTER — OFFICE VISIT (OUTPATIENT)
Dept: PULMONOLOGY | Facility: CLINIC | Age: 58
End: 2023-08-14
Payer: COMMERCIAL

## 2023-08-14 VITALS
HEIGHT: 65 IN | DIASTOLIC BLOOD PRESSURE: 69 MMHG | OXYGEN SATURATION: 95 % | HEART RATE: 75 BPM | WEIGHT: 253.4 LBS | RESPIRATION RATE: 16 BRPM | SYSTOLIC BLOOD PRESSURE: 109 MMHG | BODY MASS INDEX: 42.22 KG/M2 | TEMPERATURE: 98.6 F

## 2023-08-14 DIAGNOSIS — Z72.0 TOBACCO ABUSE: ICD-10-CM

## 2023-08-14 DIAGNOSIS — K21.9 GASTROESOPHAGEAL REFLUX DISEASE WITHOUT ESOPHAGITIS: ICD-10-CM

## 2023-08-14 DIAGNOSIS — F17.219 CIGARETTE NICOTINE DEPENDENCE WITH NICOTINE-INDUCED DISORDER: ICD-10-CM

## 2023-08-14 DIAGNOSIS — E66.01 CLASS 3 SEVERE OBESITY WITH SERIOUS COMORBIDITY AND BODY MASS INDEX (BMI) OF 40.0 TO 44.9 IN ADULT, UNSPECIFIED OBESITY TYPE: ICD-10-CM

## 2023-08-14 DIAGNOSIS — R06.02 SHORTNESS OF BREATH: ICD-10-CM

## 2023-08-14 DIAGNOSIS — J44.9 ASTHMA-COPD OVERLAP SYNDROME: ICD-10-CM

## 2023-08-14 DIAGNOSIS — G47.33 OSA (OBSTRUCTIVE SLEEP APNEA): ICD-10-CM

## 2023-08-14 DIAGNOSIS — R05.9 COUGH, UNSPECIFIED TYPE: ICD-10-CM

## 2023-08-14 DIAGNOSIS — I50.32 CHRONIC HEART FAILURE WITH PRESERVED EJECTION FRACTION: ICD-10-CM

## 2023-08-14 DIAGNOSIS — J30.9 ALLERGIC RHINITIS, UNSPECIFIED SEASONALITY, UNSPECIFIED TRIGGER: Primary | ICD-10-CM

## 2023-08-14 DIAGNOSIS — R53.82 CHRONIC FATIGUE: ICD-10-CM

## 2023-08-14 PROCEDURE — 3074F SYST BP LT 130 MM HG: CPT | Performed by: INTERNAL MEDICINE

## 2023-08-14 PROCEDURE — 99214 OFFICE O/P EST MOD 30 MIN: CPT | Performed by: INTERNAL MEDICINE

## 2023-08-14 PROCEDURE — 1159F MED LIST DOCD IN RCRD: CPT | Performed by: INTERNAL MEDICINE

## 2023-08-14 PROCEDURE — 1160F RVW MEDS BY RX/DR IN RCRD: CPT | Performed by: INTERNAL MEDICINE

## 2023-08-14 PROCEDURE — 3078F DIAST BP <80 MM HG: CPT | Performed by: INTERNAL MEDICINE

## 2023-08-14 RX ORDER — EZETIMIBE 10 MG/1
1 TABLET ORAL DAILY
COMMUNITY
Start: 2023-08-08

## 2023-08-14 RX ORDER — NALDEMEDINE 0.2 MG/1
1 TABLET ORAL DAILY
COMMUNITY
Start: 2023-08-08

## 2023-08-14 NOTE — PROGRESS NOTES
Primary Care Provider  Maria De Jesus Narayanan APRN     Referring Provider  No ref. provider found     Chief Complaint  Asthma, COPD, Sleep Apnea, Follow-up (6 Month ), Shortness of Breath, Wheezing, and Cough    Subjective          Dang Hunt presents to [facility]  History of Present Illness  Dang Hunt is a 58 y.o. female patient with morbid obesity, obstructive sleep apnea, diastolic heart failure, asthma and COPD overlap syndrome.  She is here for follow-up.  Since her last office visit, she has been using Advair 2 puffs 500 mcg twice daily and albuterol as needed which she uses once or twice a week.  She has not needed any antibiotics or steroids since last office visit.  She has been diagnosed with diabetes and is currently on medication.  She is currently doing metformin.  She is currently smoking 10 cigarettes a day.  She continues to use her CPAP machine.  [employer] is her [employer] company.  Her previous CPAP uses data looked fine.  Her average daily use has been about 8 hours on 48 minutes, apnea-hypopnea index on it is 4.  CPAP pressure is at 11 cm of water.  She is willing to try to quit smoking.  She has not had significant changes in weight or appetite.  No fever or chills.  No nausea or vomiting.  Tries to keep her self active, is short of breath with minimal exertion.  I had ordered COVID-19 antibodies which were very high on last office visit.  She does have some ankle swelling at times.  She had low-dose lung cancer and a CT scan of the chest which showed stable lung nodules and atelectasis.    Review of Systems     General:  No Fatigue, No Fever No weight loss or loss of appetite  HEENT: No dysphagia, No Visual Changes, no rhinorrhea  Respiratory:  + cough,+Dyspnea, no phlegm, No Pleuritic Pain, no wheezing, no hemoptysis.  Cardiovascular: Denies chest pain, denies palpitations,+OBRIEN, No Chest Pressure  Gastrointestinal:  No Abdominal Pain,  No Nausea, No Vomiting, No Diarrhea  Genitourinary:  No Dysuria, No Frequency, No Hesitancy  Musculoskeletal: No muscle pain or swelling  Endocrine:  No Heat Intolerance, No Cold Intolerance  Hematologic:  No Bleeding, No Bruising  Psychiatric:  No Anxiety, No Depression  Neurologic:  No Confusion, no Dysarthria, No Headaches  Skin:  No Rash, No Open Wounds    Family History   Problem Relation Age of Onset    Obesity Mother     Hypertension Mother     COPD Mother     Obesity Sister     Hypertension Sister     Obesity Maternal Grandmother     Hypertension Maternal Grandmother     Stroke Maternal Grandmother     Heart attack Maternal Grandmother     Arrhythmia Father     Hypertension Father         Social History     Socioeconomic History    Marital status:     Number of children: 2   Tobacco Use    Smoking status: Every Day     Packs/day: 0.50     Years: 35.00     Pack years: 17.50     Types: Cigarettes     Start date: 1/1/1985    Smokeless tobacco: Never   Vaping Use    Vaping Use: Never used   Substance and Sexual Activity    Alcohol use: No    Drug use: No    Sexual activity: Yes     Partners: Male     Birth control/protection: Surgical        Past Medical History:   Diagnosis Date    Anxiety     Arthritis     Asthma     CHF (congestive heart failure)     Chronic diastolic CHF (congestive heart failure) 06/10/2022    Colitis     COPD (chronic obstructive pulmonary disease)     Depression     Dysphagia     Edema     Fatigue     GERD (gastroesophageal reflux disease)     History of acute pancreatitis     History of histoplasmosis     HTN (hypertension)     Hyperlipidemia     Inflammatory bowel disease     Irritable bowel syndrome     Light headed     Low back pain     Migraines     Multiple joint pain     On home oxygen therapy     2 L AT NIGHT    MANI (obstructive sleep apnea)     WEARS CPAP    Pancreatitis     RLS (restless legs syndrome)     Type 2 diabetes mellitus without complication, without long-term  current use of insulin 01/06/2023        Immunization History   Administered Date(s) Administered    COVID-19 (MODERNA) 1st,2nd,3rd Dose Monovalent 01/11/2022    COVID-19 (PFIZER) BIVALENT 12+YRS 01/01/2022    Flu Vaccine Quad PF >36MO 09/13/2016    Fluzone >6mos 09/13/2016    Influenza Quad Vaccine (Inpatient) 12/14/2012    PPD Test 12/14/2012    Td (TDVAX) 05/24/2002         Allergies   Allergen Reactions    Zetia [Ezetimibe] Dizziness          Current Outpatient Medications:     albuterol sulfate  (90 Base) MCG/ACT inhaler, Inhale 2 puffs Every 4 (Four) Hours As Needed for Wheezing or Shortness of Air., Disp: 1 g, Rfl: 11    atorvastatin (LIPITOR) 40 MG tablet, Take 1 tablet by mouth Every Night., Disp: 90 tablet, Rfl: 1    chlorthalidone (HYGROTON) 25 MG tablet, Take 1 tablet by mouth Daily., Disp: 90 tablet, Rfl: 1    fluticasone-salmeterol (Advair Diskus) 500-50 MCG/DOSE DISKUS, Inhale 1 puff 2 (Two) Times a Day for 30 days. Rinse mouth out after each use, Disp: 1 each, Rfl: 11    furosemide (LASIX) 40 MG tablet, Take 1 tablet by mouth Daily. 1/2-1 tab po qd depending on weight, Disp: 30 tablet, Rfl: 3    gabapentin (NEURONTIN) 100 MG capsule, Every 12 (Twelve) Hours., Disp: , Rfl:     glucose monitor monitoring kit, Check blood sugar twice daily., Disp: 1 each, Rfl: 0    ketotifen (ZADITOR) 0.025 % ophthalmic solution, Administer 1 drop to both eyes 2 (Two) Times a Day., Disp: 10 mL, Rfl: 5    metFORMIN (Glucophage) 500 MG tablet, Take 1 tablet by mouth 2 (Two) Times a Day With Meals., Disp: 60 tablet, Rfl: 2    metoprolol succinate XL (TOPROL-XL) 50 MG 24 hr tablet, Take 1 tablet by mouth Daily., Disp: 90 tablet, Rfl: 1    montelukast (SINGULAIR) 10 MG tablet, Take 1 tablet by mouth Every Morning., Disp: 90 tablet, Rfl: 3    oxyCODONE-acetaminophen (PERCOCET) 5-325 MG per tablet, Take 1 tablet by mouth Every 8 (Eight) Hours As Needed., Disp: , Rfl:     potassium chloride (K-DUR,KLOR-CON) 20 MEQ CR  "tablet, Take 2 tablets by mouth 2 (Two) Times a Day., Disp: 120 tablet, Rfl: 2    Symproic 0.2 MG tablet, Take 1 tablet by mouth Daily., Disp: , Rfl:     venlafaxine XR (EFFEXOR-XR) 75 MG 24 hr capsule, Take 1 capsule by mouth Daily., Disp: , Rfl:     vitamin D3 125 MCG (5000 UT) capsule capsule, Take 1 capsule by mouth Daily., Disp: 90 capsule, Rfl: 1    allopurinol (Zyloprim) 100 MG tablet, Take 1 tablet by mouth Daily. (Patient not taking: Reported on 8/14/2023), Disp: 30 tablet, Rfl: 2    busPIRone (BUSPAR) 15 MG tablet, TAKE ONE TABLET BY MOUTH THREE TIMES DAILY AS NEEDED (Patient not taking: Reported on 8/14/2023), Disp: 90 tablet, Rfl: 0    Cariprazine HCl (Vraylar) 1.5 MG capsule capsule, Take 1 capsule by mouth Daily. (Patient not taking: Reported on 8/14/2023), Disp: 30 capsule, Rfl: 2    ezetimibe (ZETIA) 10 MG tablet, Take 1 tablet by mouth Daily. (Patient not taking: Reported on 8/14/2023), Disp: , Rfl:     metoprolol succinate XL (TOPROL-XL) 50 MG 24 hr tablet, Take 1 tablet by mouth Daily. (Patient not taking: Reported on 8/14/2023), Disp: , Rfl:     ubrogepant 100 MG tablet, Take 1 tablet by mouth 1 (One) Time As Needed (migraine). (Patient not taking: Reported on 8/14/2023), Disp: 8 tablet, Rfl: 2     Objective   Vital Signs:   /69 (BP Location: Left arm, Patient Position: Sitting, Cuff Size: Large Adult)   Pulse 75   Temp 98.6 øF (37 øC) (Tympanic)   Resp 16   Ht 165.1 cm (65\")   Wt 115 kg (253 lb 6.4 oz)   SpO2 95% Comment: room air/Nocturnal/ 2 liters  BMI 42.17 kg/mý     Mallampatti classification : 1  Physical Exam  Vital Signs Reviewed  Obese female, pleasant, in no acute distress, normal conversational  HEENT:  PERRL, EOMI.  OP, nares clear, no sinus tenderness  Neck:  Supple, no JVD, no thyromegaly  Lymph: no axillary, cervical, supraclavicular lymphadenopathy noted bilaterally  Chest:  good aeration, bilateral diminished breath sounds, no wheezing, crackles or rhonchi, resonant " to percussion b/l  CV: RRR, no MGR, pulses 2+, equal  Abd:  Soft, NT, ND, + BS, no HSM  EXT:  no clubbing, no cyanosis, No BLE edema  Neuro:  A&Ox3, CN grossly intact, no focal deficits  Skin: No rashes or lesions noted     Result Review :   The following data was reviewed by: Gadiel Solis MD on 01/25/2023:  Common labs          2/28/2023    08:40 3/9/2023    11:15 7/20/2023    07:47   Common Labs   Glucose 101  80  109    BUN 15  17  15    Creatinine 1.26  1.17  1.09    Sodium 141  139  142    Potassium 3.3  3.5  3.1    Chloride 101  99  101    Calcium 10.0  9.6  9.9    Albumin 4.1   4.1    Total Bilirubin 0.4   0.3    Alkaline Phosphatase 121   112    AST (SGOT) 12   16    ALT (SGPT) 18   18    WBC   8.60    Hemoglobin   13.9    Hematocrit   40.9    Platelets   267    Total Cholesterol 160   143    Triglycerides 312   372    HDL Cholesterol 28   29    LDL Cholesterol  81   57    Hemoglobin A1C 6.40   6.60    Microalbumin, Urine <1.2      Uric Acid  9.5  10.7      CMP          2/28/2023    08:40 3/9/2023    11:15 7/20/2023    07:47   CMP   Glucose 101  80  109    BUN 15  17  15    Creatinine 1.26  1.17  1.09    EGFR 50.2  54.9  59.4    Sodium 141  139  142    Potassium 3.3  3.5  3.1    Chloride 101  99  101    Calcium 10.0  9.6  9.9    Total Protein 7.2   7.1    Albumin 4.1   4.1    Globulin 3.1   3.0    Total Bilirubin 0.4   0.3    Alkaline Phosphatase 121   112    AST (SGOT) 12   16    ALT (SGPT) 18   18    Albumin/Globulin Ratio 1.3   1.4    BUN/Creatinine Ratio 11.9  14.5  13.8    Anion Gap 11.9  11.0  13.0      CBC          12/1/2022    15:55 7/20/2023    07:47   CBC   WBC 8.37  8.60    RBC 4.65  4.82    Hemoglobin 13.2  13.9    Hematocrit 41.0  40.9    MCV 88.2  84.9    MCH 28.4  28.8    MCHC 32.2  34.0    RDW 14.0  14.0    Platelets 260  267      CBC w/diff      CBC w/Diff 12/1/22   WBC 8.37   RBC 4.65   Hemoglobin 13.2   Hematocrit 41.0   MCV 88.2   MCH 28.4   MCHC 32.2   RDW 14.0   Platelets 260    Neutrophil Rel % 52.4   Immature Granulocyte Rel % 0.2   Lymphocyte Rel % 37.3   Monocyte Rel % 5.6   Eosinophil Rel % 3.5   Basophil Rel % 1.0             Data reviewed: Radiologic studies Previous imaging reviewed.      Narrative & Impression   PROCEDURE:  CT CHEST WO CONTRAST DIAGNOSTIC     COMPARISON: [facility], CT, CT CHEST WO CONTRAST DIAGNOSTIC, 4/04/2022,   11:24.     INDICATIONS:  lung nodules     TECHNIQUE:    CT images were created without the administration of contrast material.       PROTOCOL:     Standard imaging protocol performed                 RADIATION:      DLP: 358 mGy*cm               Automated exposure control was utilized to minimize radiation dose.      FINDINGS:          Nkechi/mediastinum:  No adenopathy.  Calcified prevascular lymph nodes.  Thoracic aorta normal in   caliber.  No definite coronary calcification.  No pericardial effusion     Lungs/pleura:  Emphysema.  Stable right basilar lung nodules.  No new suspicious nodule.  No acute   pulmonary abnormality.  No pleural effusion     Bones/soft tissues:  No suspicious bone lesion.  Midthoracic degenerative disc disease     Upper abdomen:  See report for CT abdomen pelvis     IMPRESSION:               Stable basilar pulmonary nodules.  No new suspicious abnormality            KAJAL MARINELLI MD         Electronically Signed and Approved By: KAJAL MARINELLI MD on 4/05/2023 at 11:11      =================================================================      PROCEDURE:  CT CHEST WO CONTRAST DIAGNOSTIC     COMPARISON: [facility], CT, CHEST W/ CONTRAST, 6/01/2015, 13:54.  [facility], CT, CHEST W/ CONTRAST, 3/17/2016, 23:01.  [facility], CR, XR CHEST 1 VW,   8/09/2021, 11:37.  [facility], CR, XR CHEST 1 VW, 8/05/2021, 23:32.  [facility], CT, ABDOMEN/PELVIS WITH CONTRAST, 10/09/2020, 12:39.     INDICATIONS:  HX OF COVID PNEUMONIA, F/U      PROTOCOL:     Standard imaging protocol performed                 RADIATION:      DLP: 883.2mGy*cm               Automated exposure control was utilized to minimize radiation dose.      TECHNIQUE:    Axial images of the chest without intravenous contrast.     FINDINGS:  Mild emphysema is present in the mid and upper lung fields.  No evidence of pleural or pericardial   effusion.  No evidence of pneumothorax.  There is no mediastinal, axillary or hilar adenopathy.    There are calcified prevascular nodes.  There are stable areas of scarring in the mid and lower   lung fields.  No evidence of peripheral reticulation or bronchiectasis.  There is a 3 mm   noncalcified nodule medially in the left lower lobe.  Degenerative changes are present in the   thoracic spine.  Prior cholecystectomy.  Prior gastric sleeve procedure.  Stable small adrenal   adenomas.  Stable 6 mm noncalcified right lower lobe nodule.     IMPRESSION:      1. Mild emphysema.     2. Stable mild scarring in the mid and lower lung fields.     3. 3 mm noncalcified nodule medially in the left lower lobe. The findings include a single,   incidentally detected, solid pulmonary nodule, measuring less than 6mm.  2017 guidelines from the   Fleischner Society for the follow-up and management of incidentally detected indeterminate   pulmonary nodules in persons at least 35 years of age depend on nodule size (average length and   width) and underlying risk factors (including smoking and other risk factors). Please consider the   following recommendations after clinical assessment of risk factors.  For <6mm solid nodules: In   low risk patients, no follow-up required.  If suspicious morphology or upper lobe location,   consider 12 month follow-up.  In high risk patients, optional CT in 12 months.         CRISTOPHER PERSON MD         Electronically Signed and Approved By: CRISTOPHER PERSON MD on 4/04/2022 at 12:12        Assessment and Plan    Diagnoses and all  orders for this visit:    1. Allergic rhinitis, unspecified seasonality, unspecified trigger (Primary)    2. Chronic heart failure with preserved ejection fraction    3. Class 3 severe obesity with serious comorbidity and body mass index (BMI) of 40.0 to 44.9 in adult, unspecified obesity type    4. Gastroesophageal reflux disease without esophagitis    5. Asthma-COPD overlap syndrome    6. Cough, unspecified type    7. Shortness of breath    8. MANI (obstructive sleep apnea)    9. Chronic fatigue    10. Tobacco abuse    11. Cigarette nicotine dependence with nicotine-induced disorder      Obstructive sleep apnea and obesity: She continues to use AutoPap at 5 to 20 cm of water.  Sleep hygiene was discussed in detail.  Weight loss counseling was done.  We will review CPAP usage data once available.    COPD and asthma overlap: Continue with Advair 500/50 2 puffs twice daily.  Albuterol as needed.  Asthma exacerbation action plan was discussed in detail.  Allergic rhinitis: Continue with Singulair.    Smoking cessation counseling was done for more than 5 minutes.  She has Chantix at home.  Caused her to have nightmares.  She is not taking it now as she is trying to control her blood sugar.  Wants to go back on it once her blood sugar issues are better.  Wants to try low-dose at 0.5 mg twice daily once he is ready.    Advised her to get flu shot as soon as available. She is up-to-date on pneumonia.  She took 1 COVID-19 vaccine but had severe headache and was dizzy.  I will check SARS-CoV-2 antibody profile  last office visit.  COVID antibody was very high.    Follow Up   Return in about 6 months (around 2/14/2024).  Patient was given instructions and counseling regarding her condition or for health maintenance advice. Please see specific information pulled into the AVS if appropriate.       Electronically signed by Gadiel Solis MD, 8/14/2023, 16:44 EDT.

## 2023-08-21 ENCOUNTER — TELEPHONE (OUTPATIENT)
Dept: FAMILY MEDICINE CLINIC | Facility: CLINIC | Age: 58
End: 2023-08-21

## 2023-08-21 DIAGNOSIS — M10.9 ACUTE GOUT INVOLVING TOE, UNSPECIFIED CAUSE, UNSPECIFIED LATERALITY: Primary | ICD-10-CM

## 2023-08-21 RX ORDER — COLCHICINE 0.6 MG/1
TABLET ORAL
Qty: 3 TABLET | Refills: 0 | Status: SHIPPED | OUTPATIENT
Start: 2023-08-21 | End: 2023-09-11 | Stop reason: SDUPTHER

## 2023-08-21 RX ORDER — PREDNISONE 10 MG/1
30 TABLET ORAL DAILY
Qty: 15 TABLET | Refills: 0 | Status: SHIPPED | OUTPATIENT
Start: 2023-08-21 | End: 2023-08-26

## 2023-08-21 NOTE — TELEPHONE ENCOUNTER
Caller: Dang Hunt    Relationship: Self    Best call back number: [phone]     What medication are you requesting: SOMETHING FOR GOUT IN LEFT BIG TOE    What are your current symptoms: PAIN IN TOE, BURNING, CAN'T WALK ON IT    How long have you been experiencing symptoms: COUPLE OF DAYS    Have you had these symptoms before:    [x] Yes  [] No    Have you been treated for these symptoms before:   [x] Yes  [] No    If a prescription is needed, what is your preferred pharmacy and phone number: [facility], [place] - [address] - [phone] [facility] [phone]      Additional notes: PATIENT STATED THAT HER GOUT IS BACK AND WOULD LIKE SOMETHING CALLED IN IF POSSIBLE.

## 2023-08-21 NOTE — TELEPHONE ENCOUNTER
I sent in prednisone and colchicine.  She needs to stop the atorvastatin until after she finishes her medications.

## 2023-08-30 ENCOUNTER — LAB (OUTPATIENT)
Dept: FAMILY MEDICINE CLINIC | Facility: CLINIC | Age: 58
End: 2023-08-30
Payer: COMMERCIAL

## 2023-09-05 ENCOUNTER — OFFICE VISIT (OUTPATIENT)
Dept: FAMILY MEDICINE CLINIC | Facility: CLINIC | Age: 58
End: 2023-09-05
Payer: COMMERCIAL

## 2023-09-05 VITALS
SYSTOLIC BLOOD PRESSURE: 131 MMHG | BODY MASS INDEX: 42.15 KG/M2 | HEIGHT: 65 IN | OXYGEN SATURATION: 95 % | HEART RATE: 85 BPM | TEMPERATURE: 98.1 F | WEIGHT: 253 LBS | DIASTOLIC BLOOD PRESSURE: 79 MMHG

## 2023-09-05 DIAGNOSIS — Z00.00 ANNUAL PHYSICAL EXAM: Primary | ICD-10-CM

## 2023-09-05 DIAGNOSIS — R30.0 DYSURIA: ICD-10-CM

## 2023-09-05 DIAGNOSIS — F33.1 MODERATE EPISODE OF RECURRENT MAJOR DEPRESSIVE DISORDER: ICD-10-CM

## 2023-09-05 DIAGNOSIS — E66.01 CLASS 3 SEVERE OBESITY WITH SERIOUS COMORBIDITY AND BODY MASS INDEX (BMI) OF 40.0 TO 44.9 IN ADULT, UNSPECIFIED OBESITY TYPE: ICD-10-CM

## 2023-09-05 DIAGNOSIS — M1A.9XX0 CHRONIC GOUT WITHOUT TOPHUS, UNSPECIFIED CAUSE, UNSPECIFIED SITE: ICD-10-CM

## 2023-09-05 DIAGNOSIS — I10 ESSENTIAL HYPERTENSION: ICD-10-CM

## 2023-09-05 DIAGNOSIS — Z23 NEED FOR INFLUENZA VACCINATION: ICD-10-CM

## 2023-09-05 DIAGNOSIS — E11.69 TYPE 2 DIABETES MELLITUS WITH OTHER SPECIFIED COMPLICATION, WITHOUT LONG-TERM CURRENT USE OF INSULIN: ICD-10-CM

## 2023-09-05 DIAGNOSIS — I50.9 CONGESTIVE HEART FAILURE, UNSPECIFIED HF CHRONICITY, UNSPECIFIED HEART FAILURE TYPE: ICD-10-CM

## 2023-09-05 DIAGNOSIS — E87.6 HYPOKALEMIA: ICD-10-CM

## 2023-09-05 PROBLEM — M46.1 INFLAMMATION OF SACROILIAC JOINT: Status: ACTIVE | Noted: 2023-08-04

## 2023-09-05 PROBLEM — M79.18 MYOFASCIAL PAIN: Status: ACTIVE | Noted: 2023-08-04

## 2023-09-05 LAB
ANION GAP SERPL CALCULATED.3IONS-SCNC: 12.7 MMOL/L (ref 5–15)
BACTERIA UR QL AUTO: ABNORMAL /HPF
BILIRUB BLD-MCNC: NEGATIVE MG/DL
BILIRUB UR QL STRIP: NEGATIVE
BUN SERPL-MCNC: 13 MG/DL (ref 6–20)
BUN/CREAT SERPL: 11.4 (ref 7–25)
CALCIUM SPEC-SCNC: 10.1 MG/DL (ref 8.6–10.5)
CHLORIDE SERPL-SCNC: 99 MMOL/L (ref 98–107)
CLARITY UR: CLEAR
CLARITY, POC: CLEAR
CO2 SERPL-SCNC: 28.3 MMOL/L (ref 22–29)
COLOR UR: YELLOW
COLOR UR: YELLOW
CREAT SERPL-MCNC: 1.14 MG/DL (ref 0.57–1)
EGFRCR SERPLBLD CKD-EPI 2021: 56.3 ML/MIN/1.73
EXPIRATION DATE: ABNORMAL
GLUCOSE SERPL-MCNC: 93 MG/DL (ref 65–99)
GLUCOSE UR STRIP-MCNC: NEGATIVE MG/DL
GLUCOSE UR STRIP-MCNC: NEGATIVE MG/DL
HGB UR QL STRIP.AUTO: ABNORMAL
HYALINE CASTS UR QL AUTO: ABNORMAL /LPF
KETONES UR QL STRIP: NEGATIVE
KETONES UR QL: NEGATIVE
LEUKOCYTE EST, POC: NEGATIVE
LEUKOCYTE ESTERASE UR QL STRIP.AUTO: NEGATIVE
Lab: ABNORMAL
NITRITE UR QL STRIP: NEGATIVE
NITRITE UR-MCNC: NEGATIVE MG/ML
PH UR STRIP.AUTO: 6.5 [PH] (ref 5–8)
PH UR: 6 [PH] (ref 5–8)
POTASSIUM SERPL-SCNC: 3.1 MMOL/L (ref 3.5–5.2)
PROT UR QL STRIP: NEGATIVE
PROT UR STRIP-MCNC: NEGATIVE MG/DL
RBC # UR STRIP: ABNORMAL /HPF
RBC # UR STRIP: ABNORMAL /UL
REF LAB TEST METHOD: ABNORMAL
SODIUM SERPL-SCNC: 140 MMOL/L (ref 136–145)
SP GR UR STRIP: 1.01 (ref 1–1.03)
SP GR UR: 1.01 (ref 1–1.03)
SQUAMOUS #/AREA URNS HPF: ABNORMAL /HPF
UROBILINOGEN UR QL STRIP: ABNORMAL
UROBILINOGEN UR QL: ABNORMAL
WBC # UR STRIP: ABNORMAL /HPF

## 2023-09-05 PROCEDURE — 87086 URINE CULTURE/COLONY COUNT: CPT | Performed by: NURSE PRACTITIONER

## 2023-09-05 PROCEDURE — 80048 BASIC METABOLIC PNL TOTAL CA: CPT | Performed by: NURSE PRACTITIONER

## 2023-09-05 PROCEDURE — 81001 URINALYSIS AUTO W/SCOPE: CPT | Performed by: NURSE PRACTITIONER

## 2023-09-05 RX ORDER — ALLOPURINOL 100 MG/1
100 TABLET ORAL DAILY
Qty: 30 TABLET | Refills: 2 | Status: CANCELLED | OUTPATIENT
Start: 2023-09-05

## 2023-09-05 RX ORDER — FUROSEMIDE 40 MG/1
40 TABLET ORAL DAILY
Qty: 30 TABLET | Refills: 2 | Status: SHIPPED | OUTPATIENT
Start: 2023-09-05

## 2023-09-05 RX ORDER — PEN NEEDLE, DIABETIC 33 GX5/32"
1 NEEDLE, DISPOSABLE MISCELLANEOUS WEEKLY
Qty: 100 EACH | Refills: 0 | Status: SHIPPED | OUTPATIENT
Start: 2023-09-05

## 2023-09-05 RX ORDER — BUSPIRONE HYDROCHLORIDE 15 MG/1
15 TABLET ORAL 3 TIMES DAILY PRN
Qty: 90 TABLET | Refills: 0 | Status: CANCELLED | OUTPATIENT
Start: 2023-09-05

## 2023-09-05 NOTE — PROGRESS NOTES
Chief Complaint  Annual Exam and Diabetes    Subjective          Dang Hunt, 58 y.o. female presents to [facility]  History of Present Illness   She presents today for an annual physical and to follow-up on diabetes, uncontrolled hypertension, depression, and gout.    She is due for annual physical.  Discussed and reviewed:   Screening Mammography, she is scheduled for mammogram.  Pap smears, she states she has had a total hysterectomy and does not need Pap smears anymore.  [facility] IMMUNIZATIONS: Tdap (refuses), Influenza (given today), Prevnar 20 (states she will get with her pulmonologist), and Shingrix (discussed she needs to discuss this with her pulmonologist).    Diabetes type 2, non-insulin-dependent: She was started on metformin 500 mg 2x/day but she is having diarrhea, nausea and feeling weak sometimes.  She states that she has been trying to take the metformin twice a day but it does seem to make her feel bad, complains of heart palpitations.  She is morbidly obese with a BMI greater than 42.  She states that she needs a new glucometer but she forgot what brand she uses.    Hypertension: Her blood pressure was not at goal at her last visit but was stable.  Blood pressure is better today.  She is on chlorthalidone 25 mg daily and metoprolol XL 50 mg daily.    Gout: Her uric acid level was high and I started her on allopurinol 100 mg daily.  She called on 8/7/2023 with a gout attack so she was prescribed a prednisone Dosepak.  She called again on 8/21/2023 with a gout attack and was prescribed prednisone and colchicine. She stopped taking allopurinol.  She states she does not want to take medicine for prevention anymore because it made her gout worse.    Depression: Her depression was not well controlled on just Effexor so I had added on Vraylar.  She states that her insurance did not want to pay for Vraylar. She increased her Effexor back to 150 mg dose. She states she  "still is having depression.  No suicidal or homicidal ideation.    She states she would like to have her urine checked today because she feels like she could be getting a UTI.    Tobacco Use: High Risk    Smoking Tobacco Use: Every Day    Smokeless Tobacco Use: Never    Passive Exposure: Not on file      Objective   Vital Signs:   /79 (BP Location: Left arm, Patient Position: Sitting, Cuff Size: Adult)   Pulse 85   Temp 98.1 °F (36.7 °C)   Ht 165.1 cm (65\")   Wt 115 kg (253 lb)   SpO2 95%   BMI 42.10 kg/m²       Current Outpatient Medications:     albuterol sulfate  (90 Base) MCG/ACT inhaler, Inhale 2 puffs Every 4 (Four) Hours As Needed for Wheezing or Shortness of Air., Disp: 1 g, Rfl: 11    atorvastatin (LIPITOR) 40 MG tablet, Take 1 tablet by mouth Every Night., Disp: 90 tablet, Rfl: 1    busPIRone (BUSPAR) 15 MG tablet, TAKE ONE TABLET BY MOUTH THREE TIMES DAILY AS NEEDED, Disp: 90 tablet, Rfl: 0    Cariprazine HCl (Vraylar) 1.5 MG capsule capsule, Take 1 capsule by mouth Daily., Disp: 30 capsule, Rfl: 2    chlorthalidone (HYGROTON) 25 MG tablet, Take 1 tablet by mouth Daily., Disp: 90 tablet, Rfl: 1    colchicine 0.6 MG tablet, Take 2 tablets at once then repeat 1 tablet in 1 hour for gout, Disp: 3 tablet, Rfl: 0    ezetimibe (ZETIA) 10 MG tablet, Take 1 tablet by mouth Daily., Disp: , Rfl:     furosemide (LASIX) 40 MG tablet, Take 1 tablet by mouth Daily. 1/2-1 tab po qd depending on weight, Disp: 30 tablet, Rfl: 2    gabapentin (NEURONTIN) 100 MG capsule, Every 12 (Twelve) Hours., Disp: , Rfl:     glucose monitor monitoring kit, Check blood sugar twice daily., Disp: 1 each, Rfl: 0    ketotifen (ZADITOR) 0.025 % ophthalmic solution, Administer 1 drop to both eyes 2 (Two) Times a Day., Disp: 10 mL, Rfl: 5    metoprolol succinate XL (TOPROL-XL) 50 MG 24 hr tablet, Take 1 tablet by mouth Daily., Disp: 90 tablet, Rfl: 1    montelukast (SINGULAIR) 10 MG tablet, Take 1 tablet by mouth Every " Morning., Disp: 90 tablet, Rfl: 3    oxyCODONE-acetaminophen (PERCOCET) 5-325 MG per tablet, Take 1 tablet by mouth Every 8 (Eight) Hours As Needed., Disp: , Rfl:     potassium chloride (K-DUR,KLOR-CON) 20 MEQ CR tablet, Take 2 tablets by mouth 2 (Two) Times a Day., Disp: 120 tablet, Rfl: 2    Symproic 0.2 MG tablet, Take 1 tablet by mouth Daily., Disp: , Rfl:     ubrogepant 100 MG tablet, Take 1 tablet by mouth 1 (One) Time As Needed (migraine)., Disp: 8 tablet, Rfl: 2    venlafaxine XR (EFFEXOR-XR) 75 MG 24 hr capsule, Take 1 capsule by mouth Daily., Disp: , Rfl:     vitamin D3 125 MCG (5000 UT) capsule capsule, Take 1 capsule by mouth Daily., Disp: 90 capsule, Rfl: 1    fluticasone-salmeterol (Advair Diskus) 500-50 MCG/DOSE DISKUS, Inhale 1 puff 2 (Two) Times a Day for 30 days. Rinse mouth out after each use, Disp: 1 each, Rfl: 11    Insulin Pen Needle (Pen Needles) 33G X 4 MM misc, Use 1 each 1 (One) Time Per Week., Disp: 100 each, Rfl: 0    Semaglutide,0.25 or 0.5MG/DOS, (OZEMPIC) 2 MG/3ML solution pen-injector, Inject 0.25 mg under the skin into the appropriate area as directed 1 (One) Time Per Week., Disp: 3 mL, Rfl: 0   Past Medical History:   Diagnosis Date    Anxiety     Arthritis     Asthma     CHF (congestive heart failure)     Chronic diastolic CHF (congestive heart failure) 06/10/2022    Colitis     COPD (chronic obstructive pulmonary disease)     Depression     Dysphagia     Edema     Fatigue     GERD (gastroesophageal reflux disease)     History of acute pancreatitis     History of histoplasmosis     HTN (hypertension)     Hyperlipidemia     Inflammatory bowel disease     Irritable bowel syndrome     Light headed     Low back pain     Migraines     Multiple joint pain     On home oxygen therapy     2 L AT NIGHT    MANI (obstructive sleep apnea)     WEARS CPAP    Pancreatitis     RLS (restless legs syndrome)     Type 2 diabetes mellitus without complication, without long-term current use of insulin  01/06/2023      Physical Exam  Vitals reviewed.   Constitutional:       Appearance: Normal appearance. She is well-developed. She is morbidly obese.   Neck:      Thyroid: No thyroid mass, thyromegaly or thyroid tenderness.   Cardiovascular:      Rate and Rhythm: Normal rate and regular rhythm.      Heart sounds: No murmur heard.    No friction rub. No gallop.   Pulmonary:      Effort: Pulmonary effort is normal.      Breath sounds: Normal breath sounds. No wheezing or rhonchi.   Lymphadenopathy:      Cervical: No cervical adenopathy.   Skin:     General: Skin is warm and dry.   Neurological:      Mental Status: She is alert and oriented to person, place, and time.      Cranial Nerves: No cranial nerve deficit.   Psychiatric:         Mood and Affect: Mood and affect normal.         Behavior: Behavior normal.         Thought Content: Thought content normal. Thought content does not include homicidal or suicidal ideation.         Judgment: Judgment normal.      Result Review :   {The following data was reviewed by ROSHNI Varma    Component  Ref Range & Units 07:53 5 mo ago 10 mo ago   Color  Yellow, Straw, Dark Yellow, Rebeca Yellow Yellow R Yellow R   Clarity, UA  Clear Clear Cloudy Abnormal  R Clear R   Specific Gravity  1.005 - 1.030 1.015 1.010 1.015   pH, Urine  5.0 - 8.0 6.0 7.0 6.0   Leukocytes  Negative Negative Trace Abnormal  R Negative R   Nitrite, UA  Negative Negative Negative R Negative R   Protein, POC  Negative mg/dL Negative Negative R Negative R   Glucose, UA  Negative mg/dL Negative Negative R Negative R   Ketones, UA  Negative Negative Negative R Negative R   Urobilinogen, UA  Normal, 0.2 E.U./dL 0.2 E.U./dL 0.2 E.U./dL R 0.2 E.U./dL R   Bilirubin  Negative Negative Negative R Negative R   Blood, UA  Negative Small Abnormal  Trace Abnormal  R Small Abnormal      Common Labs   Common labs          2/28/2023    08:40 3/9/2023    11:15 7/20/2023    07:47   Common Labs   Glucose 101  80  109     BUN 15  17  15    Creatinine 1.26  1.17  1.09    Sodium 141  139  142    Potassium 3.3  3.5  3.1    Chloride 101  99  101    Calcium 10.0  9.6  9.9    Albumin 4.1   4.1    Total Bilirubin 0.4   0.3    Alkaline Phosphatase 121   112    AST (SGOT) 12   16    ALT (SGPT) 18   18    WBC   8.60    Hemoglobin   13.9    Hematocrit   40.9    Platelets   267    Total Cholesterol 160   143    Triglycerides 312   372    HDL Cholesterol 28   29    LDL Cholesterol  81   57    Hemoglobin A1C 6.40   6.60    Microalbumin, Urine <1.2      Uric Acid  9.5  10.7             Assessment and Plan    Diagnoses and all orders for this visit:    1. Annual physical exam (Primary)    2. Type 2 diabetes mellitus with other specified complication, without long-term current use of insulin  Assessment & Plan:  Diabetes is  stable but weight is severely above goal .  She had an intolerance to metformin.  Reminded to get yearly retinal exam.  I will start her on Ozempic 25 mg weekly.  Diabetes will be reassessed in 1 month.    Orders:  -     Semaglutide,0.25 or 0.5MG/DOS, (OZEMPIC) 2 MG/3ML solution pen-injector; Inject 0.25 mg under the skin into the appropriate area as directed 1 (One) Time Per Week.  Dispense: 3 mL; Refill: 0  -     Insulin Pen Needle (Pen Needles) 33G X 4 MM misc; Use 1 each 1 (One) Time Per Week.  Dispense: 100 each; Refill: 0    3. Moderate episode of recurrent major depressive disorder  Assessment & Plan:  Patient's depression is recurrent and is moderate without psychosis. Their depression is currently active and the condition is worsening. This will be reassessed in 4 weeks. F/U as described: I will represcribe Vraylar and she will continue Effexor .    Orders:  -     Cariprazine HCl (Vraylar) 1.5 MG capsule capsule; Take 1 capsule by mouth Daily.  Dispense: 30 capsule; Refill: 2    4. Chronic gout without tophus, unspecified cause, unspecified site  Assessment & Plan:  Currently stable, patient declines to take any more  preventative medication.      5. Congestive heart failure, unspecified HF chronicity, unspecified heart failure type  Assessment & Plan:  Congestive heart failure is currently stable, on Lasix 40 mg daily.    Orders:  -     furosemide (LASIX) 40 MG tablet; Take 1 tablet by mouth Daily. 1/2-1 tab po qd depending on weight  Dispense: 30 tablet; Refill: 2    6. Hypokalemia  Assessment & Plan:  We will recheck a BMP today due to her low potassium.    Orders:  -     Basic Metabolic Panel    7. Essential hypertension  Assessment & Plan:  Hypertension is improving with treatment.  Continue current treatment regimen.  Weight loss.  Continue current medications.  Blood pressure will be reassessed at the next regular appointment.    Orders:  -     Basic Metabolic Panel    8. Class 3 severe obesity with serious comorbidity and body mass index (BMI) of 40.0 to 44.9 in adult, unspecified obesity type    9. Dysuria  Comments:  UA with trace blood, otherwise negative, will send for microscopy and culture.  Orders:  -     POCT urinalysis dipstick, automated  -     Urinalysis With Microscopic - Urine, Clean Catch  -     Urine Culture - Urine, Urine, Clean Catch    10. Need for influenza vaccination  -     Fluzone >6 Months        Follow Up   Return in about 4 weeks (around 10/3/2023) for Recheck diabetes and depression.  Patient was given instructions and counseling regarding her condition or for health maintenance advice. Please see specific information pulled into the AVS if appropriate.     Parts of this note are electronic transcriptions/translations of spoken language to printed text using the Dragon Dictation system.      Maria De Jesus Narayanan, ROSHNI  09/05/2023

## 2023-09-05 NOTE — ASSESSMENT & PLAN NOTE
Patient's depression is recurrent and is moderate without psychosis. Their depression is currently active and the condition is worsening. This will be reassessed in 4 weeks. F/U as described: I will represcribe Vraylar and she will continue Effexor .

## 2023-09-05 NOTE — ASSESSMENT & PLAN NOTE
Diabetes is  stable but weight is severely above goal .  She had an intolerance to metformin.  Reminded to get yearly retinal exam.  I will start her on Ozempic 25 mg weekly.  Diabetes will be reassessed in 1 month.

## 2023-09-06 LAB — BACTERIA SPEC AEROBE CULT: ABNORMAL

## 2023-09-07 ENCOUNTER — TELEPHONE (OUTPATIENT)
Dept: FAMILY MEDICINE CLINIC | Facility: CLINIC | Age: 58
End: 2023-09-07
Payer: COMMERCIAL

## 2023-09-07 DIAGNOSIS — F41.9 ANXIETY: ICD-10-CM

## 2023-09-07 DIAGNOSIS — E78.2 MIXED HYPERLIPIDEMIA: ICD-10-CM

## 2023-09-07 RX ORDER — EZETIMIBE 10 MG/1
TABLET ORAL
Qty: 90 TABLET | Refills: 1 | Status: SHIPPED | OUTPATIENT
Start: 2023-09-07

## 2023-09-07 RX ORDER — BUSPIRONE HYDROCHLORIDE 15 MG/1
TABLET ORAL
Qty: 90 TABLET | Refills: 1 | Status: SHIPPED | OUTPATIENT
Start: 2023-09-07

## 2023-09-11 ENCOUNTER — TELEPHONE (OUTPATIENT)
Dept: FAMILY MEDICINE CLINIC | Facility: CLINIC | Age: 58
End: 2023-09-11
Payer: COMMERCIAL

## 2023-09-11 DIAGNOSIS — M10.9 ACUTE GOUT INVOLVING TOE, UNSPECIFIED CAUSE, UNSPECIFIED LATERALITY: ICD-10-CM

## 2023-09-11 RX ORDER — COLCHICINE 0.6 MG/1
TABLET ORAL
Qty: 3 TABLET | Refills: 0 | Status: SHIPPED | OUTPATIENT
Start: 2023-09-11

## 2023-09-11 NOTE — TELEPHONE ENCOUNTER
I sent in colchicine to take, advised to take 2 tablets when she gets it and then repeat 1 tablet in 1 hour.  Advised her to not take the cholesterol medicine atorvastatin for the next 3 nights.

## 2023-09-21 ENCOUNTER — HOSPITAL ENCOUNTER (OUTPATIENT)
Dept: MAMMOGRAPHY | Facility: HOSPITAL | Age: 58
Discharge: HOME | End: 2023-09-21
Admitting: NURSE PRACTITIONER
Payer: COMMERCIAL

## 2023-09-21 DIAGNOSIS — Z12.31 ENCOUNTER FOR SCREENING MAMMOGRAM FOR MALIGNANT NEOPLASM OF BREAST: ICD-10-CM

## 2023-09-21 PROCEDURE — 77063 BREAST TOMOSYNTHESIS BI: CPT

## 2023-09-21 PROCEDURE — 77067 SCR MAMMO BI INCL CAD: CPT

## 2023-10-05 DIAGNOSIS — M1A.9XX0 CHRONIC GOUT WITHOUT TOPHUS, UNSPECIFIED CAUSE, UNSPECIFIED SITE: ICD-10-CM

## 2023-10-05 DIAGNOSIS — E87.6 HYPOKALEMIA: ICD-10-CM

## 2023-10-05 DIAGNOSIS — E11.69 TYPE 2 DIABETES MELLITUS WITH OTHER SPECIFIED COMPLICATION, WITHOUT LONG-TERM CURRENT USE OF INSULIN: ICD-10-CM

## 2023-10-05 RX ORDER — ALLOPURINOL 100 MG/1
TABLET ORAL
Qty: 30 TABLET | Refills: 2 | OUTPATIENT
Start: 2023-10-05

## 2023-10-05 RX ORDER — POTASSIUM CHLORIDE 20 MEQ/1
TABLET, EXTENDED RELEASE ORAL
Qty: 120 TABLET | Refills: 2 | Status: SHIPPED | OUTPATIENT
Start: 2023-10-05

## 2023-10-05 NOTE — TELEPHONE ENCOUNTER
Metformin and allopurinol were both discontinued due to side effects.  Please inform pharmacy.  It is okay to refill the potassium.

## 2023-10-06 ENCOUNTER — OFFICE VISIT (OUTPATIENT)
Dept: FAMILY MEDICINE CLINIC | Facility: CLINIC | Age: 58
End: 2023-10-06
Payer: COMMERCIAL

## 2023-10-06 VITALS
HEART RATE: 83 BPM | HEIGHT: 65 IN | SYSTOLIC BLOOD PRESSURE: 110 MMHG | OXYGEN SATURATION: 96 % | WEIGHT: 240.9 LBS | TEMPERATURE: 97.4 F | DIASTOLIC BLOOD PRESSURE: 80 MMHG | BODY MASS INDEX: 40.14 KG/M2

## 2023-10-06 DIAGNOSIS — M1A.0720 CHRONIC IDIOPATHIC GOUT INVOLVING TOE OF LEFT FOOT WITHOUT TOPHUS: ICD-10-CM

## 2023-10-06 DIAGNOSIS — E87.6 HYPOKALEMIA: ICD-10-CM

## 2023-10-06 DIAGNOSIS — F33.1 MODERATE EPISODE OF RECURRENT MAJOR DEPRESSIVE DISORDER: ICD-10-CM

## 2023-10-06 DIAGNOSIS — E78.2 MIXED HYPERLIPIDEMIA: ICD-10-CM

## 2023-10-06 DIAGNOSIS — E66.01 CLASS 3 SEVERE OBESITY WITH SERIOUS COMORBIDITY AND BODY MASS INDEX (BMI) OF 40.0 TO 44.9 IN ADULT, UNSPECIFIED OBESITY TYPE: ICD-10-CM

## 2023-10-06 DIAGNOSIS — E11.69 TYPE 2 DIABETES MELLITUS WITH OTHER SPECIFIED COMPLICATION, WITHOUT LONG-TERM CURRENT USE OF INSULIN: Primary | ICD-10-CM

## 2023-10-06 PROCEDURE — 1159F MED LIST DOCD IN RCRD: CPT | Performed by: NURSE PRACTITIONER

## 2023-10-06 PROCEDURE — 3044F HG A1C LEVEL LT 7.0%: CPT | Performed by: NURSE PRACTITIONER

## 2023-10-06 PROCEDURE — 3079F DIAST BP 80-89 MM HG: CPT | Performed by: NURSE PRACTITIONER

## 2023-10-06 PROCEDURE — 99214 OFFICE O/P EST MOD 30 MIN: CPT | Performed by: NURSE PRACTITIONER

## 2023-10-06 PROCEDURE — 1160F RVW MEDS BY RX/DR IN RCRD: CPT | Performed by: NURSE PRACTITIONER

## 2023-10-06 PROCEDURE — 3074F SYST BP LT 130 MM HG: CPT | Performed by: NURSE PRACTITIONER

## 2023-10-06 RX ORDER — ALLOPURINOL 100 MG/1
100 TABLET ORAL DAILY
COMMUNITY

## 2023-10-06 RX ORDER — PEN NEEDLE, DIABETIC 33 GX5/32"
1 NEEDLE, DISPOSABLE MISCELLANEOUS WEEKLY
Qty: 100 EACH | Refills: 0 | Status: SHIPPED | OUTPATIENT
Start: 2023-10-06

## 2023-10-06 NOTE — ASSESSMENT & PLAN NOTE
Gout is starting to improve.  She is tolerating allopurinol.  We will plan to recheck uric acid level next month.

## 2023-10-06 NOTE — ASSESSMENT & PLAN NOTE
Diabetes is improving with treatment.   Continue current treatment regimen.  Dietary recommendations for ADA diet.  Reminded to get yearly retinal exam.  She will get an A1c next month with her labs.  We will increase Ozempic dose to 0.5 mg once weekly.  Advised that if she is tolerating medicine well to notify me before her refill is due and I will increase the dose.  Diabetes will be reassessed in 3 months.

## 2023-10-06 NOTE — PROGRESS NOTES
"Chief Complaint  Diabetes and Depression    Subjective          Dang Hunt, 58 y.o. female presents to [facility]  History of Present Illness   Patient presents today for a 1 month follow-up on diabetes and depression.    Depression: Her depression was worsening so I added on Vraylar and had her continue Effexor.  She states that she is feeling better.    Diabetes type 2, non-insulin-dependent/obesity: I started her on Ozempic 0.25 mg weekly. She states that she is leaving this medication.  She has lost 13 pounds since starting Ozempic last month.  She denies having any side effects.    She has chronic gout in her left toe/foot.  She states she has restarted the allopurinol to try again.    Hyperlipidemia: She is not taking Zetia, states that it caused dizziness.  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    She has hypokalemia secondary to diuretic and is taking her potassium as prescribed.       Tobacco Use: High Risk    Smoking Tobacco Use: Every Day    Smokeless Tobacco Use: Never    Passive Exposure: Not on file      Objective   Vital Signs:   /80   Pulse 83   Temp 97.4 °F (36.3 °C)   Ht 165.1 cm (65\")   Wt 109 kg (240 lb 14.4 oz)   SpO2 96%   BMI 40.09 kg/m²       Current Outpatient Medications:     albuterol sulfate  (90 Base) MCG/ACT inhaler, Inhale 2 puffs Every 4 (Four) Hours As Needed for Wheezing or Shortness of Air., Disp: 1 g, Rfl: 11    atorvastatin (LIPITOR) 40 MG tablet, Take 1 tablet by mouth Every Night., Disp: 90 tablet, Rfl: 1    busPIRone (BUSPAR) 15 MG tablet, TAKE ONE TABLET BY MOUTH THREE TIMES DAILY AS NEEDED, Disp: 90 tablet, Rfl: 1    Cariprazine HCl (Vraylar) 1.5 MG capsule capsule, Take 1 capsule by mouth Daily., Disp: 30 capsule, Rfl: 2    chlorthalidone (HYGROTON) 25 MG tablet, Take 1 tablet by mouth Daily., Disp: 90 tablet, Rfl: 1    furosemide (LASIX) 40 " MG tablet, Take 1 tablet by mouth Daily. 1/2-1 tab po qd depending on weight, Disp: 30 tablet, Rfl: 2    gabapentin (NEURONTIN) 100 MG capsule, Every 12 (Twelve) Hours., Disp: , Rfl:     glucose monitor monitoring kit, Check blood sugar twice daily., Disp: 1 each, Rfl: 0    Insulin Pen Needle (Pen Needles) 33G X 4 MM misc, Use 1 each 1 (One) Time Per Week., Disp: 100 each, Rfl: 0    ketotifen (ZADITOR) 0.025 % ophthalmic solution, Administer 1 drop to both eyes 2 (Two) Times a Day., Disp: 10 mL, Rfl: 5    metoprolol succinate XL (TOPROL-XL) 50 MG 24 hr tablet, Take 1 tablet by mouth Daily., Disp: 90 tablet, Rfl: 1    montelukast (SINGULAIR) 10 MG tablet, Take 1 tablet by mouth Every Morning., Disp: 90 tablet, Rfl: 3    oxyCODONE-acetaminophen (PERCOCET) 5-325 MG per tablet, Take 1 tablet by mouth Every 8 (Eight) Hours As Needed., Disp: , Rfl:     potassium chloride (K-DUR,KLOR-CON) 20 MEQ CR tablet, TAKE TWO TABLETS BY MOUTH TWICE DAILY, Disp: 120 tablet, Rfl: 2    Semaglutide,0.25 or 0.5MG/DOS, (OZEMPIC) 2 MG/3ML solution pen-injector, Inject 0.5 mg under the skin into the appropriate area as directed 1 (One) Time Per Week., Disp: 3 mL, Rfl: 0    Symproic 0.2 MG tablet, Take 1 tablet by mouth Daily., Disp: , Rfl:     ubrogepant 100 MG tablet, Take 1 tablet by mouth 1 (One) Time As Needed (migraine)., Disp: 8 tablet, Rfl: 2    venlafaxine XR (EFFEXOR-XR) 75 MG 24 hr capsule, Take 1 capsule by mouth Daily., Disp: , Rfl:     vitamin D3 125 MCG (5000 UT) capsule capsule, Take 1 capsule by mouth Daily., Disp: 90 capsule, Rfl: 1    allopurinol (ZYLOPRIM) 100 MG tablet, Take 1 tablet by mouth Daily., Disp: , Rfl:     colchicine 0.6 MG tablet, Take 2 tablets at once then repeat 1 tablet in 1 hour for gout, Disp: 3 tablet, Rfl: 0    fluticasone-salmeterol (Advair Diskus) 500-50 MCG/DOSE DISKUS, Inhale 1 puff 2 (Two) Times a Day for 30 days. Rinse mouth out after each use, Disp: 1 each, Rfl: 11   Past Medical History:    Diagnosis Date    Anxiety     Arthritis     Asthma     CHF (congestive heart failure)     Chronic diastolic CHF (congestive heart failure) 06/10/2022    Colitis     COPD (chronic obstructive pulmonary disease)     Depression     Dysphagia     Edema     Fatigue     GERD (gastroesophageal reflux disease)     History of acute pancreatitis     History of histoplasmosis     HTN (hypertension)     Hyperlipidemia     Inflammatory bowel disease     Irritable bowel syndrome     Light headed     Low back pain     Migraines     Multiple joint pain     On home oxygen therapy     2 L AT NIGHT    MANI (obstructive sleep apnea)     WEARS CPAP    Pancreatitis     RLS (restless legs syndrome)     Type 2 diabetes mellitus without complication, without long-term current use of insulin 01/06/2023      Physical Exam  Vitals reviewed.   Constitutional:       Appearance: Normal appearance. She is well-developed. She is morbidly obese.   Neck:      Thyroid: No thyroid mass, thyromegaly or thyroid tenderness.   Cardiovascular:      Rate and Rhythm: Normal rate and regular rhythm.      Heart sounds: No murmur heard.    No friction rub. No gallop.   Pulmonary:      Effort: Pulmonary effort is normal.      Breath sounds: Normal breath sounds. No wheezing or rhonchi.   Lymphadenopathy:      Cervical: No cervical adenopathy.   Skin:     General: Skin is warm and dry.   Neurological:      Mental Status: She is alert and oriented to person, place, and time.      Cranial Nerves: No cranial nerve deficit.   Psychiatric:         Mood and Affect: Mood and affect normal.         Behavior: Behavior normal.         Thought Content: Thought content normal. Thought content does not include homicidal or suicidal ideation.         Judgment: Judgment normal.      Result Review :   {The following data was reviewed by ROSHNI Varma    Mammo Screening Digital Tomosynthesis Bilateral With CAD    Result Date: 9/22/2023   Benign mammogram. Suggest  routine mammographic screening.  RECOMMENDATION(S):  ROUTINE MAMMOGRAM AND CLINICAL EVALUATION IN 12 MONTHS.   BIRADS:  DIAGNOSTIC CATEGORY 1--NEGATIVE.   BREAST COMPOSITION: Scattered areas fibroglandular density.  PLEASE NOTE:  A NORMAL MAMMOGRAM DOES NOT EXCLUDE THE POSSIBILITY OF BREAST CANCER. ANY CLINICALLY SUSPICIOUS PALPABLE LUMP SHOULD BE BIOPSIED.      MARYCARMEN MELCHOR MD       Electronically Signed and Approved By: MARYCARMEN MELCHOR MD on 9/22/2023 at 8:41               Common Labs   Common labs          3/9/2023    11:15 7/20/2023    07:47 9/5/2023    07:51   Common Labs   Glucose 80  109  93    BUN 17  15  13    Creatinine 1.17  1.09  1.14    Sodium 139  142  140    Potassium 3.5  3.1  3.1    Chloride 99  101  99    Calcium 9.6  9.9  10.1    Albumin  4.1     Total Bilirubin  0.3     Alkaline Phosphatase  112     AST (SGOT)  16     ALT (SGPT)  18     WBC  8.60     Hemoglobin  13.9     Hematocrit  40.9     Platelets  267     Total Cholesterol  143     Triglycerides  372     HDL Cholesterol  29     LDL Cholesterol   57     Hemoglobin A1C  6.60     Uric Acid 9.5  10.7              Assessment and Plan    Diagnoses and all orders for this visit:    1. Type 2 diabetes mellitus with other specified complication, without long-term current use of insulin (Primary)  Assessment & Plan:  Diabetes is improving with treatment.   Continue current treatment regimen.  Dietary recommendations for ADA diet.  Reminded to get yearly retinal exam.  She will get an A1c next month with her labs.  We will increase Ozempic dose to 0.5 mg once weekly.  Advised that if she is tolerating medicine well to notify me before her refill is due and I will increase the dose.  Diabetes will be reassessed in 3 months.    Orders:  -     Semaglutide,0.25 or 0.5MG/DOS, (OZEMPIC) 2 MG/3ML solution pen-injector; Inject 0.5 mg under the skin into the appropriate area as directed 1 (One) Time Per Week.  Dispense: 3 mL; Refill: 0  -     Insulin Pen  Needle (Pen Needles) 33G X 4 MM misc; Use 1 each 1 (One) Time Per Week.  Dispense: 100 each; Refill: 0  -     Hemoglobin A1c; Future    2. Moderate episode of recurrent major depressive disorder  Assessment & Plan:  Patient's depression is recurrent and is moderate without psychosis. Their depression is currently active and the condition is improving with treatment. This will be reassessed in 3 months. F/U as described:patient will continue current medication therapy.      3. Hypokalemia  Assessment & Plan:  She is taking her potassium as prescribed.  We will plan to recheck potassium level with her labs next month.      4. Class 3 severe obesity with serious comorbidity and body mass index (BMI) of 40.0 to 44.9 in adult, unspecified obesity type  Assessment & Plan:  Patient's (Body mass index is 40.09 kg/m².) indicates that they are morbidly/severely obese (BMI > 40 or > 35 with obesity - related health condition) with health conditions that include hypertension, diabetes mellitus, and dyslipidemias . Weight is improving with treatment. BMI  is above average; BMI management plan is completed. We discussed low calorie, low carb based diet program, portion control, and pharmacologic options including continuing Ozempic for diabetes .     Orders:  -     Vitamin D,25-Hydroxy; Future    5. Chronic idiopathic gout involving toe of left foot without tophus  Assessment & Plan:  Gout is starting to improve.  She is tolerating allopurinol.  We will plan to recheck uric acid level next month.    Orders:  -     Uric Acid; Future    6. Mixed hyperlipidemia  Assessment & Plan:  Lipid abnormalities are unchanged.  Advised to continue atorvastatin 40 mg every evening.  She can hold off on Setia and we will see what her labs look like when she gets her fasting lipid panel next month.  Lipids will be reassessed in 3 months.    Orders:  -     Comprehensive Metabolic Panel; Future  -     Lipid Panel; Future        Follow Up   Return  in about 3 months (around 1/6/2024) for 30 min apt for complex pt and fasting labs in 1 month.  Patient was given instructions and counseling regarding her condition or for health maintenance advice. Please see specific information pulled into the AVS if appropriate.     Parts of this note are electronic transcriptions/translations of spoken language to printed text using the Dragon Dictation system.      Maria De Jesus Narayanan, ROSHNI  10/06/2023

## 2023-10-06 NOTE — ASSESSMENT & PLAN NOTE
She is taking her potassium as prescribed.  We will plan to recheck potassium level with her labs next month.

## 2023-10-06 NOTE — ASSESSMENT & PLAN NOTE
Lipid abnormalities are unchanged.  Advised to continue atorvastatin 40 mg every evening.  She can hold off on Setia and we will see what her labs look like when she gets her fasting lipid panel next month.  Lipids will be reassessed in 3 months.

## 2023-10-06 NOTE — ASSESSMENT & PLAN NOTE
Patient's (Body mass index is 40.09 kg/m².) indicates that they are morbidly/severely obese (BMI > 40 or > 35 with obesity - related health condition) with health conditions that include hypertension, diabetes mellitus, and dyslipidemias . Weight is improving with treatment. BMI  is above average; BMI management plan is completed. We discussed low calorie, low carb based diet program, portion control, and pharmacologic options including continuing Ozempic for diabetes .

## 2023-10-27 ENCOUNTER — TELEPHONE (OUTPATIENT)
Dept: FAMILY MEDICINE CLINIC | Facility: CLINIC | Age: 58
End: 2023-10-27
Payer: COMMERCIAL

## 2023-10-27 DIAGNOSIS — M10.9 GOUT, UNSPECIFIED CAUSE, UNSPECIFIED CHRONICITY, UNSPECIFIED SITE: ICD-10-CM

## 2023-10-27 DIAGNOSIS — E11.69 TYPE 2 DIABETES MELLITUS WITH OTHER SPECIFIED COMPLICATION, WITHOUT LONG-TERM CURRENT USE OF INSULIN: ICD-10-CM

## 2023-10-27 DIAGNOSIS — M10.9 ACUTE GOUT INVOLVING TOE, UNSPECIFIED CAUSE, UNSPECIFIED LATERALITY: Primary | ICD-10-CM

## 2023-10-27 RX ORDER — PREDNISONE 20 MG/1
20 TABLET ORAL 2 TIMES DAILY
Qty: 10 TABLET | Refills: 0 | Status: SHIPPED | OUTPATIENT
Start: 2023-10-27 | End: 2023-11-01

## 2023-10-27 NOTE — TELEPHONE ENCOUNTER
I will send her in some prednisone.  I recommend that she see a rheumatologist since she continues to have gout flareup.  If she is agreeable let me know and I will put in a referral.

## 2023-10-27 NOTE — TELEPHONE ENCOUNTER
Caller: Dang Hunt    Relationship: Self    Best call back number: [phone]     What medication are you requesting: SOMETHING TO TREAT GOUT FLAIR UP IN FOOT    If a prescription is needed, what is your preferred pharmacy and phone number: [facility] - [place], [place] - [address] - [phone]  - [phone] FX

## 2023-11-07 ENCOUNTER — LAB (OUTPATIENT)
Dept: FAMILY MEDICINE CLINIC | Facility: CLINIC | Age: 58
End: 2023-11-07
Payer: COMMERCIAL

## 2023-11-07 DIAGNOSIS — M1A.0720 CHRONIC IDIOPATHIC GOUT INVOLVING TOE OF LEFT FOOT WITHOUT TOPHUS: ICD-10-CM

## 2023-11-07 DIAGNOSIS — F41.9 ANXIETY: ICD-10-CM

## 2023-11-07 DIAGNOSIS — E66.01 CLASS 3 SEVERE OBESITY WITH SERIOUS COMORBIDITY AND BODY MASS INDEX (BMI) OF 40.0 TO 44.9 IN ADULT, UNSPECIFIED OBESITY TYPE: ICD-10-CM

## 2023-11-07 DIAGNOSIS — E78.2 MIXED HYPERLIPIDEMIA: ICD-10-CM

## 2023-11-07 DIAGNOSIS — E11.69 TYPE 2 DIABETES MELLITUS WITH OTHER SPECIFIED COMPLICATION, WITHOUT LONG-TERM CURRENT USE OF INSULIN: ICD-10-CM

## 2023-11-07 LAB
25(OH)D3 SERPL-MCNC: 76.2 NG/ML (ref 30–100)
ALBUMIN SERPL-MCNC: 4.1 G/DL (ref 3.5–5.2)
ALBUMIN/GLOB SERPL: 1.3 G/DL
ALP SERPL-CCNC: 111 U/L (ref 39–117)
ALT SERPL W P-5'-P-CCNC: 22 U/L (ref 1–33)
ANION GAP SERPL CALCULATED.3IONS-SCNC: 10 MMOL/L (ref 5–15)
AST SERPL-CCNC: 14 U/L (ref 1–32)
BILIRUB SERPL-MCNC: 0.4 MG/DL (ref 0–1.2)
BUN SERPL-MCNC: 14 MG/DL (ref 6–20)
BUN/CREAT SERPL: 11.5 (ref 7–25)
CALCIUM SPEC-SCNC: 10 MG/DL (ref 8.6–10.5)
CHLORIDE SERPL-SCNC: 100 MMOL/L (ref 98–107)
CHOLEST SERPL-MCNC: 196 MG/DL (ref 0–200)
CO2 SERPL-SCNC: 31 MMOL/L (ref 22–29)
CREAT SERPL-MCNC: 1.22 MG/DL (ref 0.57–1)
EGFRCR SERPLBLD CKD-EPI 2021: 51.9 ML/MIN/1.73
GLOBULIN UR ELPH-MCNC: 3.2 GM/DL
GLUCOSE SERPL-MCNC: 84 MG/DL (ref 65–99)
HBA1C MFR BLD: 5.8 % (ref 4.8–5.6)
HDLC SERPL-MCNC: 32 MG/DL (ref 40–60)
LDLC SERPL CALC-MCNC: 111 MG/DL (ref 0–100)
LDLC/HDLC SERPL: 3.22 {RATIO}
POTASSIUM SERPL-SCNC: 3 MMOL/L (ref 3.5–5.2)
PROT SERPL-MCNC: 7.3 G/DL (ref 6–8.5)
SODIUM SERPL-SCNC: 141 MMOL/L (ref 136–145)
TRIGL SERPL-MCNC: 305 MG/DL (ref 0–150)
URATE SERPL-MCNC: 7.6 MG/DL (ref 2.4–5.7)
VLDLC SERPL-MCNC: 53 MG/DL (ref 5–40)

## 2023-11-07 PROCEDURE — 80061 LIPID PANEL: CPT | Performed by: NURSE PRACTITIONER

## 2023-11-07 PROCEDURE — 36415 COLL VENOUS BLD VENIPUNCTURE: CPT | Performed by: NURSE PRACTITIONER

## 2023-11-07 PROCEDURE — 83036 HEMOGLOBIN GLYCOSYLATED A1C: CPT | Performed by: NURSE PRACTITIONER

## 2023-11-07 PROCEDURE — 80053 COMPREHEN METABOLIC PANEL: CPT | Performed by: NURSE PRACTITIONER

## 2023-11-07 PROCEDURE — 84550 ASSAY OF BLOOD/URIC ACID: CPT | Performed by: NURSE PRACTITIONER

## 2023-11-07 PROCEDURE — 82306 VITAMIN D 25 HYDROXY: CPT | Performed by: NURSE PRACTITIONER

## 2023-11-07 RX ORDER — BUSPIRONE HYDROCHLORIDE 15 MG/1
TABLET ORAL
Qty: 90 TABLET | Refills: 1 | OUTPATIENT
Start: 2023-11-07

## 2023-11-16 ENCOUNTER — OFFICE VISIT (OUTPATIENT)
Dept: FAMILY MEDICINE CLINIC | Facility: CLINIC | Age: 58
End: 2023-11-16
Payer: COMMERCIAL

## 2023-11-16 VITALS
SYSTOLIC BLOOD PRESSURE: 137 MMHG | HEIGHT: 65 IN | OXYGEN SATURATION: 95 % | WEIGHT: 239.1 LBS | BODY MASS INDEX: 39.83 KG/M2 | HEART RATE: 72 BPM | DIASTOLIC BLOOD PRESSURE: 77 MMHG | TEMPERATURE: 98.6 F

## 2023-11-16 DIAGNOSIS — E11.69 TYPE 2 DIABETES MELLITUS WITH OTHER SPECIFIED COMPLICATION, WITHOUT LONG-TERM CURRENT USE OF INSULIN: ICD-10-CM

## 2023-11-16 DIAGNOSIS — M54.16 LUMBAR RADICULOPATHY: ICD-10-CM

## 2023-11-16 DIAGNOSIS — M12.9 ARTHRITIS/ARTHROPATHY OF MULTIPLE JOINTS: ICD-10-CM

## 2023-11-16 DIAGNOSIS — F33.1 MODERATE EPISODE OF RECURRENT MAJOR DEPRESSIVE DISORDER: ICD-10-CM

## 2023-11-16 DIAGNOSIS — F41.9 ANXIETY: ICD-10-CM

## 2023-11-16 DIAGNOSIS — E87.6 HYPOKALEMIA: Primary | ICD-10-CM

## 2023-11-16 DIAGNOSIS — I10 PRIMARY HYPERTENSION: ICD-10-CM

## 2023-11-16 LAB
ANION GAP SERPL CALCULATED.3IONS-SCNC: 9.4 MMOL/L (ref 5–15)
BUN SERPL-MCNC: 13 MG/DL (ref 6–20)
BUN/CREAT SERPL: 12.9 (ref 7–25)
CALCIUM SPEC-SCNC: 9.6 MG/DL (ref 8.6–10.5)
CHLORIDE SERPL-SCNC: 104 MMOL/L (ref 98–107)
CO2 SERPL-SCNC: 27.6 MMOL/L (ref 22–29)
CREAT SERPL-MCNC: 1.01 MG/DL (ref 0.57–1)
EGFRCR SERPLBLD CKD-EPI 2021: 65.1 ML/MIN/1.73
GLUCOSE SERPL-MCNC: 81 MG/DL (ref 65–99)
POTASSIUM SERPL-SCNC: 3.6 MMOL/L (ref 3.5–5.2)
SODIUM SERPL-SCNC: 141 MMOL/L (ref 136–145)

## 2023-11-16 PROCEDURE — 80048 BASIC METABOLIC PNL TOTAL CA: CPT | Performed by: NURSE PRACTITIONER

## 2023-11-16 RX ORDER — VENLAFAXINE HYDROCHLORIDE 150 MG/1
150 CAPSULE, EXTENDED RELEASE ORAL DAILY
Qty: 30 CAPSULE | Refills: 5 | Status: SHIPPED | OUTPATIENT
Start: 2023-11-16

## 2023-11-16 RX ORDER — SEMAGLUTIDE 2.68 MG/ML
2 INJECTION, SOLUTION SUBCUTANEOUS WEEKLY
Qty: 3 ML | Refills: 2 | Status: SHIPPED | OUTPATIENT
Start: 2023-11-30

## 2023-11-16 NOTE — LETTER
November 16, 2023     Patient: Dang Hunt   YOB: 1965   Date of Visit: 11/16/2023       Dear [facility]:    This letter is to document that Dang Hunt is a 58 y.o. year old female who has been diagnosed with depression, anxiety, chronic lower back pain due to degenerative disc disease, and arthritis since 1/13/2017 and 11/29/2016, respectively. This diagnosis was determined by clinical diagnosis and x-rays, respectively.     This condition is chronic. This disability substantially limits Dang Hutn because she will get fidgety and need to move around more often if needed.  She may also have difficulty sitting for long periods.    Her functional limitations are difficulty sitting for long periods and concentrating on something for long periods: work tolerance. This is because her depression and anxiety causes difficulty focusing and mobility. This is because she has difficulty sitting for long period of time and will need to move around more often . The following reasonable accommodations may apply: Providing a lumbar chair will help with her lower back pain.  Allowing her to get up and move around with more frequent breaks we will also accommodate her.    Dang Hunt will continue to need assistance through job support supplied by your organization. This support may range from reasonable accommodations to counseling and vocational rehabilitation. Dang Hunt is an excellent candidate for your organization.     Sincerely,        ROSHNI Varma

## 2023-11-16 NOTE — PROGRESS NOTES
"Chief Complaint  Abnormal Lab (Potassium follow up )    Subjective          Dang Hunt, 58 y.o. female presents to [facility]  History of Present Illness   Presents today for a follow-up on hypokalemia and hypertension.  She continued to have low potassium so I had her stop chlorthalidone. Her blood pressure is stable without chlorthalidone.  She states that she has gained a few pounds since stopping chlorthalidone.  She is taking potassium 1 tablet a day now.    Depression/anxiety: We had decreased her Effexor dose and started her on Vraylar but she said that she started having issues with not being able to think.  She stopped taking the Vraylar.  She would like to go back on her higher dose of Effexor.  She is currently taking 75 mg of Effexor.    She states she is going to start a job that is at [employer].  She states that it is with [employer].  They provide accommodations for disabilities.  She needs a letter of accommodation.  She has depression and takes antidepressants.  She has lower back pain with degenerative disc disease and arthritis.  She would benefit to have a lumbar chair.  She also needs to have extra breaks where she can walk around as needed.    Diabetes type 2, non-insulin-dependent: She is doing well on Ozempic, currently on 1 mg dose.  She states that she has 2 more injections left of this dose and will need an increase.       Tobacco Use: High Risk (11/16/2023)    Patient History     Smoking Tobacco Use: Every Day     Smokeless Tobacco Use: Never     Passive Exposure: Not on file      Objective   Vital Signs:   /77 (BP Location: Left arm, Patient Position: Sitting, Cuff Size: Adult)   Pulse 72   Temp 98.6 °F (37 °C)   Ht 165.1 cm (65\")   Wt 108 kg (239 lb 1.6 oz)   SpO2 95%   BMI 39.79 kg/m²       Current Outpatient Medications:     albuterol sulfate  (90 Base) MCG/ACT inhaler, Inhale 2 puffs Every 4 (Four) Hours As Needed for Wheezing or " Shortness of Air., Disp: 1 g, Rfl: 11    allopurinol (ZYLOPRIM) 100 MG tablet, Take 1 tablet by mouth Daily., Disp: , Rfl:     atorvastatin (LIPITOR) 40 MG tablet, Take 1 tablet by mouth Every Night., Disp: 90 tablet, Rfl: 1    busPIRone (BUSPAR) 15 MG tablet, TAKE ONE TABLET BY MOUTH THREE TIMES DAILY AS NEEDED, Disp: 90 tablet, Rfl: 1    colchicine 0.6 MG tablet, Take 2 tablets at once then repeat 1 tablet in 1 hour for gout, Disp: 3 tablet, Rfl: 0    furosemide (LASIX) 40 MG tablet, Take 1 tablet by mouth Daily. 1/2-1 tab po qd depending on weight, Disp: 30 tablet, Rfl: 2    gabapentin (NEURONTIN) 100 MG capsule, Every 12 (Twelve) Hours., Disp: , Rfl:     glucose monitor monitoring kit, Check blood sugar twice daily., Disp: 1 each, Rfl: 0    Insulin Pen Needle (Pen Needles) 33G X 4 MM misc, Use 1 each 1 (One) Time Per Week., Disp: 100 each, Rfl: 0    ketotifen (ZADITOR) 0.025 % ophthalmic solution, Administer 1 drop to both eyes 2 (Two) Times a Day., Disp: 10 mL, Rfl: 5    metoprolol succinate XL (TOPROL-XL) 50 MG 24 hr tablet, Take 1 tablet by mouth Daily., Disp: 90 tablet, Rfl: 1    montelukast (SINGULAIR) 10 MG tablet, Take 1 tablet by mouth Every Morning., Disp: 90 tablet, Rfl: 3    oxyCODONE-acetaminophen (PERCOCET) 5-325 MG per tablet, Take 1 tablet by mouth Every 8 (Eight) Hours As Needed., Disp: , Rfl:     potassium chloride (K-DUR,KLOR-CON) 20 MEQ CR tablet, TAKE TWO TABLETS BY MOUTH TWICE DAILY, Disp: 120 tablet, Rfl: 2    Semaglutide, 1 MG/DOSE, (OZEMPIC) 4 MG/3ML solution pen-injector, Inject 1 mg under the skin into the appropriate area as directed 1 (One) Time Per Week., Disp: 3 mL, Rfl: 0    Symproic 0.2 MG tablet, Take 1 tablet by mouth Daily., Disp: , Rfl:     ubrogepant 100 MG tablet, Take 1 tablet by mouth 1 (One) Time As Needed (migraine)., Disp: 8 tablet, Rfl: 2    venlafaxine XR (EFFEXOR-XR) 75 MG 24 hr capsule, Take 1 capsule by mouth Daily., Disp: , Rfl:     vitamin D3 125 MCG (5000 UT)  capsule capsule, Take 1 capsule by mouth Daily., Disp: 90 capsule, Rfl: 1    fluticasone-salmeterol (Advair Diskus) 500-50 MCG/DOSE DISKUS, Inhale 1 puff 2 (Two) Times a Day for 30 days. Rinse mouth out after each use, Disp: 1 each, Rfl: 11    [START ON 11/30/2023] Semaglutide, 2 MG/DOSE, (Ozempic, 2 MG/DOSE,) 8 MG/3ML solution pen-injector, Inject 2 mg under the skin into the appropriate area as directed 1 (One) Time Per Week., Disp: 3 mL, Rfl: 2    venlafaxine XR (EFFEXOR-XR) 150 MG 24 hr capsule, Take 1 capsule by mouth Daily., Disp: 30 capsule, Rfl: 5   Past Medical History:   Diagnosis Date    Anxiety     Arthritis     Asthma     CHF (congestive heart failure)     Chronic diastolic CHF (congestive heart failure) 06/10/2022    Colitis     COPD (chronic obstructive pulmonary disease)     Depression     Dysphagia     Edema     Fatigue     GERD (gastroesophageal reflux disease)     History of acute pancreatitis     History of histoplasmosis     HTN (hypertension)     Hyperlipidemia     Inflammatory bowel disease     Irritable bowel syndrome     Light headed     Low back pain     Migraines     Multiple joint pain     On home oxygen therapy     2 L AT NIGHT    MANI (obstructive sleep apnea)     WEARS CPAP    Pancreatitis     RLS (restless legs syndrome)     Type 2 diabetes mellitus without complication, without long-term current use of insulin 01/06/2023      Physical Exam  Vitals reviewed.   Constitutional:       Appearance: Normal appearance. She is well-developed. She is obese.   Neck:      Thyroid: No thyroid mass, thyromegaly or thyroid tenderness.   Cardiovascular:      Rate and Rhythm: Normal rate and regular rhythm.      Heart sounds: No murmur heard.     No friction rub. No gallop.   Pulmonary:      Effort: Pulmonary effort is normal.      Breath sounds: Normal breath sounds. No wheezing or rhonchi.   Lymphadenopathy:      Cervical: No cervical adenopathy.   Skin:     General: Skin is warm and dry.    Neurological:      Mental Status: She is alert and oriented to person, place, and time.      Cranial Nerves: No cranial nerve deficit.   Psychiatric:         Mood and Affect: Mood and affect normal.         Behavior: Behavior normal.         Thought Content: Thought content normal. Thought content does not include homicidal or suicidal ideation.         Judgment: Judgment normal.        Result Review :   {The following data was reviewed by ROSHNI Varma    CMP   CMP          7/20/2023    07:47 9/5/2023    07:51 11/7/2023    08:54   CMP   Glucose 109  93  84    BUN 15  13  14    Creatinine 1.09  1.14  1.22    EGFR 59.4  56.3  51.9    Sodium 142  140  141    Potassium 3.1  3.1  3.0    Chloride 101  99  100    Calcium 9.9  10.1  10.0    Total Protein 7.1   7.3    Albumin 4.1   4.1    Globulin 3.0   3.2    Total Bilirubin 0.3   0.4    Alkaline Phosphatase 112   111    AST (SGOT) 16   14    ALT (SGPT) 18   22    Albumin/Globulin Ratio 1.4   1.3    BUN/Creatinine Ratio 13.8  11.4  11.5    Anion Gap 13.0  12.7  10.0                 Assessment and Plan    Diagnoses and all orders for this visit:    1. Hypokalemia (Primary)  Assessment & Plan:  I had stopped her diuretic, we will recheck a BMP today.    Orders:  -     Basic Metabolic Panel    2. Primary hypertension  Assessment & Plan:  Hypertension is improving with treatment.  Continue current treatment regimen.  Continue current medications.  Blood pressure will be reassessed at the next regular appointment.    Orders:  -     Basic Metabolic Panel    3. Moderate episode of recurrent major depressive disorder  Assessment & Plan:  Patient's depression is recurrent and is moderate without psychosis. Their depression is currently active and the condition is  worsening with the change in medications.  She has stopped Vraylar due to side effects.  We will increase Effexor dose back to her previous dose, advised to start with taking one of the 150 mg dose for 2  weeks and then may add back her 75 mg dose for a total of 225 mg daily . This will be reassessed at the next regular appointment.    Orders:  -     venlafaxine XR (EFFEXOR-XR) 150 MG 24 hr capsule; Take 1 capsule by mouth Daily.  Dispense: 30 capsule; Refill: 5    4. Anxiety  Assessment & Plan:  She did not do well with Vraylar so she stopped taking it.  She wants to increase her Effexor back to the 225 mg dose.  I will have her start 150 mg dose for 2 weeks and then she can add back the 75 mg dose.  She has a follow-up with me in 2 months.    Orders:  -     venlafaxine XR (EFFEXOR-XR) 150 MG 24 hr capsule; Take 1 capsule by mouth Daily.  Dispense: 30 capsule; Refill: 5    5. Lumbar radiculopathy  Assessment & Plan:  I have written her a letter of accommodation per her request.      6. Type 2 diabetes mellitus with other specified complication, without long-term current use of insulin  Assessment & Plan:  Diabetes is improving with treatment.   I will send her in a new prescription for the 2 mg Ozempic dose to start after she finishes her 1 mg dose.  Advised if she has difficulty getting the 2 mg dose, I will have her continue 1 mg dose longer.  Diabetes will be reassessed  2 months .    Orders:  -     Semaglutide, 2 MG/DOSE, (Ozempic, 2 MG/DOSE,) 8 MG/3ML solution pen-injector; Inject 2 mg under the skin into the appropriate area as directed 1 (One) Time Per Week.  Dispense: 3 mL; Refill: 2    7. Arthritis/arthropathy of multiple joints        Follow Up   Return for Keep Scheduled Follow-up.  Patient was given instructions and counseling regarding her condition or for health maintenance advice. Please see specific information pulled into the AVS if appropriate.     Parts of this note are electronic transcriptions/translations of spoken language to printed text using the Dragon Dictation system.      Maria De Jesus Narayanan, APRN  11/16/2023

## 2023-11-16 NOTE — ASSESSMENT & PLAN NOTE
She did not do well with Vraylar so she stopped taking it.  She wants to increase her Effexor back to the 225 mg dose.  I will have her start 150 mg dose for 2 weeks and then she can add back the 75 mg dose.  She has a follow-up with me in 2 months.

## 2023-11-16 NOTE — ASSESSMENT & PLAN NOTE
Patient's depression is recurrent and is moderate without psychosis. Their depression is currently active and the condition is  worsening with the change in medications.  She has stopped Vraylar due to side effects.  We will increase Effexor dose back to her previous dose, advised to start with taking one of the 150 mg dose for 2 weeks and then may add back her 75 mg dose for a total of 225 mg daily . This will be reassessed at the next regular appointment.

## 2023-11-16 NOTE — ASSESSMENT & PLAN NOTE
Diabetes is improving with treatment.   I will send her in a new prescription for the 2 mg Ozempic dose to start after she finishes her 1 mg dose.  Advised if she has difficulty getting the 2 mg dose, I will have her continue 1 mg dose longer.  Diabetes will be reassessed  2 months .

## 2023-12-07 DIAGNOSIS — I50.9 CONGESTIVE HEART FAILURE, UNSPECIFIED HF CHRONICITY, UNSPECIFIED HEART FAILURE TYPE: ICD-10-CM

## 2023-12-07 DIAGNOSIS — F33.1 MODERATE EPISODE OF RECURRENT MAJOR DEPRESSIVE DISORDER: ICD-10-CM

## 2023-12-07 RX ORDER — FUROSEMIDE 40 MG/1
TABLET ORAL
Qty: 30 TABLET | Refills: 0 | Status: SHIPPED | OUTPATIENT
Start: 2023-12-07

## 2023-12-07 RX ORDER — CARIPRAZINE 1.5 MG/1
1.5 CAPSULE, GELATIN COATED ORAL DAILY
Qty: 30 CAPSULE | Refills: 2 | OUTPATIENT
Start: 2023-12-07

## 2023-12-14 ENCOUNTER — TELEPHONE (OUTPATIENT)
Dept: FAMILY MEDICINE CLINIC | Facility: CLINIC | Age: 58
End: 2023-12-14

## 2023-12-14 DIAGNOSIS — M1A.9XX0 CHRONIC GOUT, UNSPECIFIED, WITHOUT TOPHUS (TOPHI): ICD-10-CM

## 2023-12-14 DIAGNOSIS — M1A.9XX0 CHRONIC GOUT WITHOUT TOPHUS, UNSPECIFIED CAUSE, UNSPECIFIED SITE: Primary | ICD-10-CM

## 2023-12-14 RX ORDER — ALLOPURINOL 100 MG/1
100 TABLET ORAL DAILY
Qty: 30 TABLET | Refills: 0 | Status: SHIPPED | OUTPATIENT
Start: 2023-12-14 | End: 2024-01-15 | Stop reason: SDUPTHER

## 2023-12-14 RX ORDER — ALLOPURINOL 100 MG/1
100 TABLET ORAL DAILY
Qty: 30 TABLET | Refills: 2 | OUTPATIENT
Start: 2023-12-14

## 2023-12-14 NOTE — TELEPHONE ENCOUNTER
Caller: Dang Hunt    Relationship: Self    Best call back number:[phone]    Requested Prescriptions:     allopurinol (ZYLOPRIM) 100 MG tablet          Pharmacy where request should be sent:  [facility] - [place], [place] - [address] - [phone] PH - [phone] FX      Last office visit with prescribing clinician: 11/16/2023   Last telemedicine visit with prescribing clinician: Visit date not found   Next office visit with prescribing clinician: 1/8/2024     Additional details provided by patient: PATIENT IS NEEDING A REFILL TO HOLD HER OVER UNTIL HER NEXT APPOINTMENT.     Does the patient have less than a 3 day supply:  [x] Yes  [] No    Would you like a call back once the refill request has been completed: [x] Yes [] No    If the office needs to give you a call back, can they leave a voicemail: [x] Yes [] No    Harvey Alberto Rep   12/14/23 16:12 EST

## 2024-01-05 DIAGNOSIS — M1A.9XX0 CHRONIC GOUT WITHOUT TOPHUS, UNSPECIFIED CAUSE, UNSPECIFIED SITE: ICD-10-CM

## 2024-01-05 DIAGNOSIS — J30.9 ALLERGIC RHINITIS, UNSPECIFIED SEASONALITY, UNSPECIFIED TRIGGER: ICD-10-CM

## 2024-01-05 DIAGNOSIS — Z72.0 TOBACCO ABUSE: ICD-10-CM

## 2024-01-05 DIAGNOSIS — R53.82 CHRONIC FATIGUE: ICD-10-CM

## 2024-01-05 DIAGNOSIS — J44.89 ASTHMA-COPD OVERLAP SYNDROME: ICD-10-CM

## 2024-01-05 DIAGNOSIS — I50.9 CONGESTIVE HEART FAILURE, UNSPECIFIED HF CHRONICITY, UNSPECIFIED HEART FAILURE TYPE: ICD-10-CM

## 2024-01-05 DIAGNOSIS — E87.6 HYPOKALEMIA: ICD-10-CM

## 2024-01-05 DIAGNOSIS — R05.9 COUGH, UNSPECIFIED TYPE: ICD-10-CM

## 2024-01-05 DIAGNOSIS — F33.1 MODERATE EPISODE OF RECURRENT MAJOR DEPRESSIVE DISORDER: ICD-10-CM

## 2024-01-05 DIAGNOSIS — U09.9 COVID-19 LONG HAULER: ICD-10-CM

## 2024-01-05 DIAGNOSIS — R06.02 SHORTNESS OF BREATH: ICD-10-CM

## 2024-01-05 DIAGNOSIS — J34.9 PARANASAL SINUS DISEASE: ICD-10-CM

## 2024-01-05 RX ORDER — FLUTICASONE PROPIONATE AND SALMETEROL 50; 500 UG/1; UG/1
1 POWDER RESPIRATORY (INHALATION) 2 TIMES DAILY
Qty: 60 EACH | Refills: 6 | Status: SHIPPED | OUTPATIENT
Start: 2024-01-05

## 2024-01-05 RX ORDER — POTASSIUM CHLORIDE 20 MEQ/1
TABLET, EXTENDED RELEASE ORAL
Qty: 120 TABLET | Refills: 0 | OUTPATIENT
Start: 2024-01-05

## 2024-01-05 RX ORDER — FUROSEMIDE 40 MG/1
TABLET ORAL
Qty: 30 TABLET | Refills: 0 | OUTPATIENT
Start: 2024-01-05

## 2024-01-05 RX ORDER — CARIPRAZINE 1.5 MG/1
1.5 CAPSULE, GELATIN COATED ORAL DAILY
Qty: 30 CAPSULE | Refills: 0 | OUTPATIENT
Start: 2024-01-05

## 2024-01-05 RX ORDER — ALBUTEROL SULFATE 90 UG/1
2 AEROSOL, METERED RESPIRATORY (INHALATION) EVERY 4 HOURS PRN
Qty: 18 G | Refills: 6 | Status: SHIPPED | OUTPATIENT
Start: 2024-01-05

## 2024-01-05 RX ORDER — ALLOPURINOL 100 MG/1
100 TABLET ORAL DAILY
Qty: 30 TABLET | Refills: 0 | OUTPATIENT
Start: 2024-01-05

## 2024-01-08 DIAGNOSIS — M10.9 ACUTE GOUT INVOLVING TOE, UNSPECIFIED CAUSE, UNSPECIFIED LATERALITY: ICD-10-CM

## 2024-01-08 RX ORDER — COLCHICINE 0.6 MG/1
TABLET ORAL
Qty: 3 TABLET | Refills: 0 | Status: SHIPPED | OUTPATIENT
Start: 2024-01-08 | End: 2024-01-15

## 2024-01-15 ENCOUNTER — TELEMEDICINE (OUTPATIENT)
Dept: FAMILY MEDICINE CLINIC | Facility: CLINIC | Age: 59
End: 2024-01-15
Payer: COMMERCIAL

## 2024-01-15 DIAGNOSIS — M1A.9XX0 CHRONIC GOUT WITHOUT TOPHUS, UNSPECIFIED CAUSE, UNSPECIFIED SITE: ICD-10-CM

## 2024-01-15 DIAGNOSIS — E78.2 MIXED HYPERLIPIDEMIA: ICD-10-CM

## 2024-01-15 DIAGNOSIS — E11.69 TYPE 2 DIABETES MELLITUS WITH OTHER SPECIFIED COMPLICATION, WITHOUT LONG-TERM CURRENT USE OF INSULIN: Primary | ICD-10-CM

## 2024-01-15 DIAGNOSIS — I10 PRIMARY HYPERTENSION: ICD-10-CM

## 2024-01-15 DIAGNOSIS — I50.9 CONGESTIVE HEART FAILURE, UNSPECIFIED HF CHRONICITY, UNSPECIFIED HEART FAILURE TYPE: ICD-10-CM

## 2024-01-15 DIAGNOSIS — E55.9 VITAMIN D DEFICIENCY: ICD-10-CM

## 2024-01-15 DIAGNOSIS — F33.1 MODERATE EPISODE OF RECURRENT MAJOR DEPRESSIVE DISORDER: ICD-10-CM

## 2024-01-15 DIAGNOSIS — E87.6 HYPOKALEMIA: ICD-10-CM

## 2024-01-15 PROCEDURE — 1159F MED LIST DOCD IN RCRD: CPT | Performed by: NURSE PRACTITIONER

## 2024-01-15 PROCEDURE — 99215 OFFICE O/P EST HI 40 MIN: CPT | Performed by: NURSE PRACTITIONER

## 2024-01-15 PROCEDURE — 1160F RVW MEDS BY RX/DR IN RCRD: CPT | Performed by: NURSE PRACTITIONER

## 2024-01-15 RX ORDER — ALLOPURINOL 100 MG/1
100 TABLET ORAL DAILY
Qty: 30 TABLET | Refills: 0 | Status: CANCELLED | OUTPATIENT
Start: 2024-01-15

## 2024-01-15 RX ORDER — POTASSIUM CHLORIDE 20 MEQ/1
20 TABLET, EXTENDED RELEASE ORAL DAILY
Qty: 30 TABLET | Refills: 2 | Status: SHIPPED | OUTPATIENT
Start: 2024-01-15

## 2024-01-15 RX ORDER — VENLAFAXINE HYDROCHLORIDE 75 MG/1
75 CAPSULE, EXTENDED RELEASE ORAL DAILY
Qty: 30 CAPSULE | Refills: 2 | Status: SHIPPED | OUTPATIENT
Start: 2024-01-15

## 2024-01-15 RX ORDER — PEN NEEDLE, DIABETIC 33 GX5/32"
1 NEEDLE, DISPOSABLE MISCELLANEOUS WEEKLY
Qty: 100 EACH | Refills: 0 | Status: CANCELLED | OUTPATIENT
Start: 2024-01-15

## 2024-01-15 RX ORDER — ATORVASTATIN CALCIUM 40 MG/1
40 TABLET, FILM COATED ORAL NIGHTLY
Qty: 30 TABLET | Refills: 2 | Status: SHIPPED | OUTPATIENT
Start: 2024-01-15

## 2024-01-15 RX ORDER — BUSPIRONE HYDROCHLORIDE 15 MG/1
15 TABLET ORAL 3 TIMES DAILY PRN
Qty: 90 TABLET | Refills: 1 | Status: CANCELLED | OUTPATIENT
Start: 2024-01-15

## 2024-01-15 RX ORDER — METOPROLOL SUCCINATE 50 MG/1
50 TABLET, EXTENDED RELEASE ORAL DAILY
Qty: 30 TABLET | Refills: 2 | Status: SHIPPED | OUTPATIENT
Start: 2024-01-15

## 2024-01-15 RX ORDER — POTASSIUM CHLORIDE 20 MEQ/1
40 TABLET, EXTENDED RELEASE ORAL 2 TIMES DAILY
Qty: 120 TABLET | Refills: 2 | Status: CANCELLED | OUTPATIENT
Start: 2024-01-15

## 2024-01-15 RX ORDER — PREDNISONE 20 MG/1
20 TABLET ORAL 2 TIMES DAILY
Qty: 10 TABLET | Refills: 0 | Status: SHIPPED | OUTPATIENT
Start: 2024-01-15 | End: 2024-01-20

## 2024-01-15 RX ORDER — ALLOPURINOL 300 MG/1
300 TABLET ORAL DAILY
Qty: 30 TABLET | Refills: 2 | Status: SHIPPED | OUTPATIENT
Start: 2024-01-15

## 2024-01-15 RX ORDER — FUROSEMIDE 40 MG/1
20-40 TABLET ORAL DAILY
Qty: 30 TABLET | Refills: 2 | Status: SHIPPED | OUTPATIENT
Start: 2024-01-15

## 2024-01-15 NOTE — TELEPHONE ENCOUNTER
Caller: Dang Hunt    Relationship: Self    Best call back number: [phone]     Requested Prescriptions:   Requested Prescriptions     Pending Prescriptions Disp Refills    allopurinol (ZYLOPRIM) 100 MG tablet 30 tablet 0     Sig: Take 1 tablet by mouth Daily.        Pharmacy where request should be sent: [place], [place] - [address] - [phone] PH - [phone] FX     Last office visit with prescribing clinician: 11/16/2023   Last telemedicine visit with prescribing clinician: Visit date not found   Next office visit with prescribing clinician: 1/16/2024       Does the patient have less than a 3 day supply:  [x] Yes  [] No      Harvey Arvizu Rep   01/15/24 10:43 EST

## 2024-01-15 NOTE — ASSESSMENT & PLAN NOTE
Currently stable on vitamin D 5000 units, will plan to recheck vitamin D level with next labs in 3 months.

## 2024-01-15 NOTE — ASSESSMENT & PLAN NOTE
Gout is not well-controlled, I am going to increase allopurinol dose to 300 mg daily.  Advised her we will give her prednisone to take with the initial increase of allopurinol to prevent an acute attack.  We will plan to recheck uric acid with her next labs in 3 months.

## 2024-01-15 NOTE — PROGRESS NOTES
Chief Complaint  Hypertension, Hyperlipidemia, Congestive Heart Failure, and Diabetes    Subjective         Dang Hunt presents to [facility]  History of Present Illness  For follow-up.    Diabetes type 2, non-insulin-dependent: She is currently on Ozempic 2 mg.  She states she does not feel that it is curbing her appetite anymore and she has not lost any more weight.  She is wanting to see if the dose could be increased but I explained to her this is the highest dose of Ozempic.    Hyperlipidemia: She is on atorvastatin 40 mg daily.    Gout: She has had another gout attack since her last visit.  I had started her on allopurinol 100 mg daily for prevention.  She states the colchicine did not help.  She took some steroids and that is the only thing that seems to help with her acute gout attacks.    Hypokalemia: She continues to have low potassium despite potassium supplements.  I had her stop chlorthalidone.  She is still taking potassium 1 tablet twice daily.  Her potassium level did improve.    Hypertension: She is currently on metoprolol XL 50 mg daily.    Congestive heart failure: She is on furosemide 40 mg, takes 1/2 to 1 tablet daily depending on her weight.  She states that she is doing well with half a tablet daily.    Depression/anxiety: She did not tolerate Vraylar.  We increased Effexor back to her previous dose 150 mg once daily and 75 mg once daily for total dose of 225 mg daily.  She states she feels she is doing fine with this dose.  She is taking BuSpar only as needed for anxiety.  She started a new job at [employer] and she states that she is doing well with this job.    Vitamin D deficiency: She is currently on vitamin D 5000 units daily.    Migraine headaches: She is currently taking Ubrelvy as needed.    Tobacco Use: High Risk (1/15/2024)    Patient History     Smoking Tobacco Use: Every Day     Smokeless Tobacco Use: Never     Passive Exposure: Not on file       Objective   Vital Signs:   There were no vitals taken for this visit.      Current Outpatient Medications:     Advair Diskus 500-50 MCG/ACT DISKUS, INHALE 1 PUFF BY MOUTH TWICE DAILY, Disp: 60 each, Rfl: 6    allopurinol (ZYLOPRIM) 300 MG tablet, Take 1 tablet by mouth Daily., Disp: 30 tablet, Rfl: 2    atorvastatin (LIPITOR) 40 MG tablet, Take 1 tablet by mouth Every Night., Disp: 30 tablet, Rfl: 2    busPIRone (BUSPAR) 15 MG tablet, TAKE ONE TABLET BY MOUTH THREE TIMES DAILY AS NEEDED, Disp: 90 tablet, Rfl: 1    furosemide (LASIX) 40 MG tablet, Take 0.5-1 tablets by mouth Daily. Depending on weight  Indications: Cardiac Failure, Disp: 30 tablet, Rfl: 2    gabapentin (NEURONTIN) 100 MG capsule, Every 12 (Twelve) Hours., Disp: , Rfl:     glucose monitor monitoring kit, Check blood sugar twice daily., Disp: 1 each, Rfl: 0    ketotifen (ZADITOR) 0.025 % ophthalmic solution, Administer 1 drop to both eyes 2 (Two) Times a Day., Disp: 10 mL, Rfl: 5    metoprolol succinate XL (TOPROL-XL) 50 MG 24 hr tablet, Take 1 tablet by mouth Daily., Disp: 30 tablet, Rfl: 2    montelukast (SINGULAIR) 10 MG tablet, Take 1 tablet by mouth Every Morning., Disp: 90 tablet, Rfl: 3    oxyCODONE-acetaminophen (PERCOCET) 5-325 MG per tablet, Take 1 tablet by mouth Every 8 (Eight) Hours As Needed., Disp: , Rfl:     potassium chloride (K-DUR,KLOR-CON) 20 MEQ CR tablet, Take 1 tablet by mouth Daily., Disp: 30 tablet, Rfl: 2    Semaglutide, 2 MG/DOSE, (Ozempic, 2 MG/DOSE,) 8 MG/3ML solution pen-injector, Inject 2 mg under the skin into the appropriate area as directed 1 (One) Time Per Week., Disp: 3 mL, Rfl: 2    Symproic 0.2 MG tablet, Take 1 tablet by mouth Daily., Disp: , Rfl:     ubrogepant 100 MG tablet, Take 1 tablet by mouth 1 (One) Time As Needed (migraine)., Disp: 8 tablet, Rfl: 2    venlafaxine XR (EFFEXOR-XR) 150 MG 24 hr capsule, Take 1 capsule by mouth Daily., Disp: 30 capsule, Rfl: 5    venlafaxine XR (EFFEXOR-XR) 75 MG 24 hr  capsule, Take 1 capsule by mouth Daily., Disp: 30 capsule, Rfl: 2    Ventolin  (90 Base) MCG/ACT inhaler, INHALE 2 PUFFS BY MOUTH EVERY 4 HOURS AS NEEDED FOR WHEEZING OR SHORTNESS OF BREATH, Disp: 18 g, Rfl: 6    vitamin D3 125 MCG (5000 UT) capsule capsule, Take 1 capsule by mouth Daily., Disp: 30 capsule, Rfl: 2    predniSONE (DELTASONE) 20 MG tablet, Take 1 tablet by mouth 2 (Two) Times a Day for 5 days. Take with increased dose of allopurinol, Disp: 10 tablet, Rfl: 0    Tirzepatide (MOUNJARO) 5 MG/0.5ML solution pen-injector pen, Inject 0.5 mL under the skin into the appropriate area as directed 1 (One) Time Per Week. Indications: Type 2 Diabetes, Disp: 2 mL, Rfl: 0  Physical Exam   Constitutional: She appears well-developed and well-nourished. She appears obese.  Pulmonary/Chest: Effort normal.   Neurological: She is alert.   Psychiatric: She has a normal mood and affect. Her speech is normal and behavior is normal. Judgment normal. She expresses no homicidal and no suicidal ideation.     Result Review :   The following data was reviewed by: ROSHNI Varma on 01/15/2024:  Common labs          9/5/2023    07:51 11/7/2023    08:54 11/16/2023    10:07   Common Labs   Glucose 93  84  81    BUN 13  14  13    Creatinine 1.14  1.22  1.01    Sodium 140  141  141    Potassium 3.1  3.0  3.6    Chloride 99  100  104    Calcium 10.1  10.0  9.6    Albumin  4.1     Total Bilirubin  0.4     Alkaline Phosphatase  111     AST (SGOT)  14     ALT (SGPT)  22     Total Cholesterol  196     Triglycerides  305     HDL Cholesterol  32     LDL Cholesterol   111     Hemoglobin A1C  5.80     Uric Acid  7.6              Assessment and Plan    Diagnoses and all orders for this visit:    1. Type 2 diabetes mellitus with other specified complication, without long-term current use of insulin (Primary)  Assessment & Plan:  Diabetes is unchanged.   Medication changes per orders.  She does not feel that Ozempic is helping her  with her appetite anymore and she is not losing any more weight.  I we will change Ozempic to Mounjaro 5 mg once weekly.  Advised her that we will plan to titrate the dose each month and to notify me when she needs refill.  Diabetes will be reassessed in 3 months.    Orders:  -     Tirzepatide (MOUNJARO) 5 MG/0.5ML solution pen-injector pen; Inject 0.5 mL under the skin into the appropriate area as directed 1 (One) Time Per Week. Indications: Type 2 Diabetes  Dispense: 2 mL; Refill: 0    2. Chronic gout without tophus, unspecified cause, unspecified site  Assessment & Plan:  Gout is not well-controlled, I am going to increase allopurinol dose to 300 mg daily.  Advised her we will give her prednisone to take with the initial increase of allopurinol to prevent an acute attack.  We will plan to recheck uric acid with her next labs in 3 months.    Orders:  -     allopurinol (ZYLOPRIM) 300 MG tablet; Take 1 tablet by mouth Daily.  Dispense: 30 tablet; Refill: 2  -     predniSONE (DELTASONE) 20 MG tablet; Take 1 tablet by mouth 2 (Two) Times a Day for 5 days. Take with increased dose of allopurinol  Dispense: 10 tablet; Refill: 0    3. Congestive heart failure, unspecified HF chronicity, unspecified heart failure type  Assessment & Plan:  Heart failure is  stable .    Continue current treatment regimen.  Continue current medications.  Heart failure will be reassessed in 3 months.    Orders:  -     furosemide (LASIX) 40 MG tablet; Take 0.5-1 tablets by mouth Daily. Depending on weight  Indications: Cardiac Failure  Dispense: 30 tablet; Refill: 2    4. Primary hypertension  Assessment & Plan:  Hypertension is improving with treatment.  Continue current treatment regimen.  Continue current medications.  Blood pressure will be reassessed in 3 months.    Orders:  -     metoprolol succinate XL (TOPROL-XL) 50 MG 24 hr tablet; Take 1 tablet by mouth Daily.  Dispense: 30 tablet; Refill: 2    5. Hypokalemia  Assessment &  Plan:  Currently stable, will continue potassium 20 mEq once daily.    Orders:  -     potassium chloride (K-DUR,KLOR-CON) 20 MEQ CR tablet; Take 1 tablet by mouth Daily.  Dispense: 30 tablet; Refill: 2    6. Mixed hyperlipidemia  Assessment & Plan:  Lipid abnormalities are stable.  Pharmacotherapy as ordered.  Lipids will be reassessed in 3 months.    Orders:  -     atorvastatin (LIPITOR) 40 MG tablet; Take 1 tablet by mouth Every Night.  Dispense: 30 tablet; Refill: 2    7. Vitamin D deficiency  Assessment & Plan:  Currently stable on vitamin D 5000 units, will plan to recheck vitamin D level with next labs in 3 months.    Orders:  -     vitamin D3 125 MCG (5000 UT) capsule capsule; Take 1 capsule by mouth Daily.  Dispense: 30 capsule; Refill: 2    8. Moderate episode of recurrent major depressive disorder  Assessment & Plan:  Patient's depression is recurrent and is moderate without psychosis. Their depression is currently active and the condition is improving with treatment. This will be reassessed in 3 months. F/U as described:patient will continue current medication therapy.    Orders:  -     venlafaxine XR (EFFEXOR-XR) 75 MG 24 hr capsule; Take 1 capsule by mouth Daily.  Dispense: 30 capsule; Refill: 2      I spent 40 minutes caring for Dang on this date of service. This time includes time spent by me in the following activities:preparing for the visit, reviewing tests, performing a medically appropriate examination and/or evaluation , counseling and educating the patient/family/caregiver, ordering medications, tests, or procedures, and documenting information in the medical record.    Follow Up   Return in about 3 months (around 4/15/2024) for Next scheduled follow up, 30 min apt for complex pt.  Patient was given instructions and counseling regarding her condition or for health maintenance advice. Please see specific information pulled into the AVS if appropriate.     Mode of Visit: Video  Location of  patient: home  [place] [facility]  You have chosen to receive care through a telehealth visit.  Does the patient consent to use a video/audio connection for your medical care today? Yes  The visit included audio and video interaction. No technical issues occurred during this visit.   Patient understanding that this is a telehealth visit.  I cannot perform a full physical exam, therefore something might be missed, or patient may need to come into the office for further evaluation.  Patient is understanding and consents to this type of visit.    Maria De Jesus Narayanan, APRN  01/15/2024

## 2024-01-15 NOTE — ASSESSMENT & PLAN NOTE
Diabetes is unchanged.   Medication changes per orders.  She does not feel that Ozempic is helping her with her appetite anymore and she is not losing any more weight.  I we will change Ozempic to Mounjaro 5 mg once weekly.  Advised her that we will plan to titrate the dose each month and to notify me when she needs refill.  Diabetes will be reassessed in 3 months.

## 2024-01-15 NOTE — ASSESSMENT & PLAN NOTE
Heart failure is  stable .    Continue current treatment regimen.  Continue current medications.  Heart failure will be reassessed in 3 months.

## 2024-01-23 DIAGNOSIS — E11.69 TYPE 2 DIABETES MELLITUS WITH OTHER SPECIFIED COMPLICATION, WITHOUT LONG-TERM CURRENT USE OF INSULIN: ICD-10-CM

## 2024-01-23 RX ORDER — SEMAGLUTIDE 2.68 MG/ML
2 INJECTION, SOLUTION SUBCUTANEOUS WEEKLY
Qty: 3 ML | Refills: 2 | Status: SHIPPED | OUTPATIENT
Start: 2024-01-23

## 2024-02-01 DIAGNOSIS — J30.9 ALLERGIC RHINITIS, UNSPECIFIED SEASONALITY, UNSPECIFIED TRIGGER: ICD-10-CM

## 2024-02-01 DIAGNOSIS — Z72.0 TOBACCO ABUSE: ICD-10-CM

## 2024-02-01 DIAGNOSIS — I10 ESSENTIAL HYPERTENSION: ICD-10-CM

## 2024-02-01 DIAGNOSIS — R06.02 SHORTNESS OF BREATH: ICD-10-CM

## 2024-02-01 DIAGNOSIS — R53.82 CHRONIC FATIGUE: ICD-10-CM

## 2024-02-01 DIAGNOSIS — J44.89 ASTHMA-COPD OVERLAP SYNDROME: ICD-10-CM

## 2024-02-01 DIAGNOSIS — R05.9 COUGH, UNSPECIFIED TYPE: ICD-10-CM

## 2024-02-01 DIAGNOSIS — U09.9 COVID-19 LONG HAULER: ICD-10-CM

## 2024-02-01 DIAGNOSIS — J34.9 PARANASAL SINUS DISEASE: ICD-10-CM

## 2024-02-01 RX ORDER — MONTELUKAST SODIUM 10 MG/1
10 TABLET ORAL EVERY MORNING
Qty: 90 TABLET | Refills: 3 | Status: SHIPPED | OUTPATIENT
Start: 2024-02-01

## 2024-02-01 RX ORDER — CHLORTHALIDONE 25 MG/1
25 TABLET ORAL DAILY
Qty: 90 TABLET | Refills: 1 | OUTPATIENT
Start: 2024-02-01

## 2024-02-14 RX ORDER — FLUTICASONE PROPIONATE AND SALMETEROL XINAFOATE 230; 21 UG/1; UG/1
2 AEROSOL, METERED RESPIRATORY (INHALATION)
Qty: 1 EACH | Refills: 11 | Status: SHIPPED | OUTPATIENT
Start: 2024-02-14

## 2024-02-15 DIAGNOSIS — E11.69 TYPE 2 DIABETES MELLITUS WITH OTHER SPECIFIED COMPLICATION, WITHOUT LONG-TERM CURRENT USE OF INSULIN: ICD-10-CM

## 2024-02-15 RX ORDER — TIRZEPATIDE 5 MG/.5ML
INJECTION, SOLUTION SUBCUTANEOUS
Qty: 2 ML | Refills: 0 | OUTPATIENT
Start: 2024-02-15

## 2024-02-19 DIAGNOSIS — M1A.9XX0 CHRONIC GOUT WITHOUT TOPHUS, UNSPECIFIED CAUSE, UNSPECIFIED SITE: ICD-10-CM

## 2024-02-19 RX ORDER — PREDNISONE 20 MG/1
20 TABLET ORAL 2 TIMES DAILY
Qty: 2 TABLET | Refills: 0 | Status: SHIPPED | OUTPATIENT
Start: 2024-02-19 | End: 2024-02-20

## 2024-02-19 RX ORDER — PREDNISONE 20 MG/1
20 TABLET ORAL 2 TIMES DAILY
Qty: 10 TABLET | Refills: 0 | Status: CANCELLED | OUTPATIENT
Start: 2024-02-19 | End: 2024-02-24

## 2024-02-19 NOTE — TELEPHONE ENCOUNTER
I will send her in some prednisone just for today but I do not want her to take a lot of prednisone due to long-term side effects. Please have her schedule an appointment with me tomorrow to discuss this.  Also advised her to bring in all her medications that she is taking so that we can verify all her medications and doses.

## 2024-02-23 ENCOUNTER — TELEPHONE (OUTPATIENT)
Dept: FAMILY MEDICINE CLINIC | Facility: CLINIC | Age: 59
End: 2024-02-23
Payer: COMMERCIAL

## 2024-02-23 NOTE — TELEPHONE ENCOUNTER
Patient called and that she had not been able to get Ozempic.  She did take Mounjaro last 5 mg weekly and wants to increase the dose.  I sent in Mounjaro 7.5 mg.

## 2024-03-04 DIAGNOSIS — E78.2 MIXED HYPERLIPIDEMIA: ICD-10-CM

## 2024-03-04 RX ORDER — EZETIMIBE 10 MG/1
TABLET ORAL
Qty: 90 TABLET | Refills: 1 | OUTPATIENT
Start: 2024-03-04

## 2024-03-25 DIAGNOSIS — E11.69 TYPE 2 DIABETES MELLITUS WITH OTHER SPECIFIED COMPLICATION, WITHOUT LONG-TERM CURRENT USE OF INSULIN: Primary | ICD-10-CM

## 2024-04-03 DIAGNOSIS — I50.9 CONGESTIVE HEART FAILURE, UNSPECIFIED HF CHRONICITY, UNSPECIFIED HEART FAILURE TYPE: ICD-10-CM

## 2024-04-03 DIAGNOSIS — M1A.9XX0 CHRONIC GOUT WITHOUT TOPHUS, UNSPECIFIED CAUSE, UNSPECIFIED SITE: ICD-10-CM

## 2024-04-03 DIAGNOSIS — E87.6 HYPOKALEMIA: ICD-10-CM

## 2024-04-03 RX ORDER — FUROSEMIDE 40 MG/1
TABLET ORAL
Qty: 30 TABLET | Refills: 0 | Status: SHIPPED | OUTPATIENT
Start: 2024-04-03

## 2024-04-03 RX ORDER — ALLOPURINOL 300 MG/1
300 TABLET ORAL DAILY
Qty: 30 TABLET | Refills: 0 | Status: SHIPPED | OUTPATIENT
Start: 2024-04-03

## 2024-04-03 RX ORDER — POTASSIUM CHLORIDE 20 MEQ/1
20 TABLET, EXTENDED RELEASE ORAL DAILY
Qty: 30 TABLET | Refills: 0 | Status: SHIPPED | OUTPATIENT
Start: 2024-04-03

## 2024-04-08 ENCOUNTER — TELEPHONE (OUTPATIENT)
Dept: FAMILY MEDICINE CLINIC | Facility: CLINIC | Age: 59
End: 2024-04-08
Payer: COMMERCIAL

## 2024-04-08 DIAGNOSIS — E11.69 TYPE 2 DIABETES MELLITUS WITH OTHER SPECIFIED COMPLICATION, WITHOUT LONG-TERM CURRENT USE OF INSULIN: Primary | ICD-10-CM

## 2024-04-15 ENCOUNTER — OFFICE VISIT (OUTPATIENT)
Dept: FAMILY MEDICINE CLINIC | Facility: CLINIC | Age: 59
End: 2024-04-15
Payer: COMMERCIAL

## 2024-04-15 VITALS
DIASTOLIC BLOOD PRESSURE: 82 MMHG | BODY MASS INDEX: 35.31 KG/M2 | TEMPERATURE: 97.4 F | HEART RATE: 72 BPM | SYSTOLIC BLOOD PRESSURE: 144 MMHG | WEIGHT: 211.9 LBS | HEIGHT: 65 IN | OXYGEN SATURATION: 97 %

## 2024-04-15 DIAGNOSIS — I50.9 CONGESTIVE HEART FAILURE, UNSPECIFIED HF CHRONICITY, UNSPECIFIED HEART FAILURE TYPE: ICD-10-CM

## 2024-04-15 DIAGNOSIS — E78.2 MIXED HYPERLIPIDEMIA: ICD-10-CM

## 2024-04-15 DIAGNOSIS — Z51.81 MEDICATION MONITORING ENCOUNTER: ICD-10-CM

## 2024-04-15 DIAGNOSIS — E11.9 TYPE 2 DIABETES MELLITUS WITHOUT COMPLICATION, WITHOUT LONG-TERM CURRENT USE OF INSULIN: Primary | ICD-10-CM

## 2024-04-15 DIAGNOSIS — F41.9 ANXIETY: ICD-10-CM

## 2024-04-15 DIAGNOSIS — E87.6 HYPOKALEMIA: ICD-10-CM

## 2024-04-15 DIAGNOSIS — I10 PRIMARY HYPERTENSION: ICD-10-CM

## 2024-04-15 DIAGNOSIS — E55.9 VITAMIN D DEFICIENCY: ICD-10-CM

## 2024-04-15 DIAGNOSIS — F33.1 MODERATE EPISODE OF RECURRENT MAJOR DEPRESSIVE DISORDER: ICD-10-CM

## 2024-04-15 DIAGNOSIS — M1A.9XX0 CHRONIC GOUT WITHOUT TOPHUS, UNSPECIFIED CAUSE, UNSPECIFIED SITE: ICD-10-CM

## 2024-04-15 LAB
25(OH)D3 SERPL-MCNC: 84.4 NG/ML (ref 30–100)
ALBUMIN SERPL-MCNC: 4.1 G/DL (ref 3.5–5.2)
ALBUMIN UR-MCNC: <1.2 MG/DL
ALBUMIN/GLOB SERPL: 1.3 G/DL
ALP SERPL-CCNC: 132 U/L (ref 39–117)
ALT SERPL W P-5'-P-CCNC: 11 U/L (ref 1–33)
ANION GAP SERPL CALCULATED.3IONS-SCNC: 10.6 MMOL/L (ref 5–15)
AST SERPL-CCNC: 13 U/L (ref 1–32)
BASOPHILS # BLD AUTO: 0.11 10*3/MM3 (ref 0–0.2)
BASOPHILS NFR BLD AUTO: 1.4 % (ref 0–1.5)
BILIRUB SERPL-MCNC: 0.4 MG/DL (ref 0–1.2)
BUN SERPL-MCNC: 9 MG/DL (ref 6–20)
BUN/CREAT SERPL: 7.8 (ref 7–25)
CALCIUM SPEC-SCNC: 9.9 MG/DL (ref 8.6–10.5)
CHLORIDE SERPL-SCNC: 105 MMOL/L (ref 98–107)
CHOLEST SERPL-MCNC: 237 MG/DL (ref 0–200)
CO2 SERPL-SCNC: 25.4 MMOL/L (ref 22–29)
CREAT SERPL-MCNC: 1.15 MG/DL (ref 0.57–1)
DEPRECATED RDW RBC AUTO: 43.4 FL (ref 37–54)
EGFRCR SERPLBLD CKD-EPI 2021: 55.7 ML/MIN/1.73
EOSINOPHIL # BLD AUTO: 0.21 10*3/MM3 (ref 0–0.4)
EOSINOPHIL NFR BLD AUTO: 2.7 % (ref 0.3–6.2)
ERYTHROCYTE [DISTWIDTH] IN BLOOD BY AUTOMATED COUNT: 14.4 % (ref 12.3–15.4)
GLOBULIN UR ELPH-MCNC: 3.1 GM/DL
GLUCOSE SERPL-MCNC: 76 MG/DL (ref 65–99)
HBA1C MFR BLD: 5.3 % (ref 4.8–5.6)
HCT VFR BLD AUTO: 43.4 % (ref 34–46.6)
HDLC SERPL-MCNC: 31 MG/DL (ref 40–60)
HGB BLD-MCNC: 14.4 G/DL (ref 12–15.9)
IMM GRANULOCYTES # BLD AUTO: 0.02 10*3/MM3 (ref 0–0.05)
IMM GRANULOCYTES NFR BLD AUTO: 0.3 % (ref 0–0.5)
LDLC SERPL CALC-MCNC: 151 MG/DL (ref 0–100)
LDLC/HDLC SERPL: 4.75 {RATIO}
LIPASE SERPL-CCNC: 35 U/L (ref 13–60)
LYMPHOCYTES # BLD AUTO: 2.33 10*3/MM3 (ref 0.7–3.1)
LYMPHOCYTES NFR BLD AUTO: 29.9 % (ref 19.6–45.3)
MCH RBC QN AUTO: 28 PG (ref 26.6–33)
MCHC RBC AUTO-ENTMCNC: 33.2 G/DL (ref 31.5–35.7)
MCV RBC AUTO: 84.3 FL (ref 79–97)
MONOCYTES # BLD AUTO: 0.48 10*3/MM3 (ref 0.1–0.9)
MONOCYTES NFR BLD AUTO: 6.2 % (ref 5–12)
NEUTROPHILS NFR BLD AUTO: 4.65 10*3/MM3 (ref 1.7–7)
NEUTROPHILS NFR BLD AUTO: 59.5 % (ref 42.7–76)
NRBC BLD AUTO-RTO: 0 /100 WBC (ref 0–0.2)
PLATELET # BLD AUTO: 271 10*3/MM3 (ref 140–450)
PMV BLD AUTO: 10.6 FL (ref 6–12)
POTASSIUM SERPL-SCNC: 4.1 MMOL/L (ref 3.5–5.2)
PROT SERPL-MCNC: 7.2 G/DL (ref 6–8.5)
RBC # BLD AUTO: 5.15 10*6/MM3 (ref 3.77–5.28)
SODIUM SERPL-SCNC: 141 MMOL/L (ref 136–145)
TRIGL SERPL-MCNC: 294 MG/DL (ref 0–150)
TSH SERPL DL<=0.05 MIU/L-ACNC: 3.39 UIU/ML (ref 0.27–4.2)
URATE SERPL-MCNC: 6.9 MG/DL (ref 2.4–5.7)
VLDLC SERPL-MCNC: 55 MG/DL (ref 5–40)
WBC NRBC COR # BLD AUTO: 7.8 10*3/MM3 (ref 3.4–10.8)

## 2024-04-15 PROCEDURE — 83036 HEMOGLOBIN GLYCOSYLATED A1C: CPT | Performed by: NURSE PRACTITIONER

## 2024-04-15 PROCEDURE — 84550 ASSAY OF BLOOD/URIC ACID: CPT | Performed by: NURSE PRACTITIONER

## 2024-04-15 PROCEDURE — 85025 COMPLETE CBC W/AUTO DIFF WBC: CPT | Performed by: NURSE PRACTITIONER

## 2024-04-15 PROCEDURE — 84443 ASSAY THYROID STIM HORMONE: CPT | Performed by: NURSE PRACTITIONER

## 2024-04-15 PROCEDURE — 36415 COLL VENOUS BLD VENIPUNCTURE: CPT | Performed by: NURSE PRACTITIONER

## 2024-04-15 PROCEDURE — 99214 OFFICE O/P EST MOD 30 MIN: CPT | Performed by: NURSE PRACTITIONER

## 2024-04-15 PROCEDURE — 83690 ASSAY OF LIPASE: CPT | Performed by: NURSE PRACTITIONER

## 2024-04-15 PROCEDURE — 82306 VITAMIN D 25 HYDROXY: CPT | Performed by: NURSE PRACTITIONER

## 2024-04-15 PROCEDURE — 80053 COMPREHEN METABOLIC PANEL: CPT | Performed by: NURSE PRACTITIONER

## 2024-04-15 PROCEDURE — 82043 UR ALBUMIN QUANTITATIVE: CPT | Performed by: NURSE PRACTITIONER

## 2024-04-15 PROCEDURE — 80061 LIPID PANEL: CPT | Performed by: NURSE PRACTITIONER

## 2024-04-15 RX ORDER — POTASSIUM CHLORIDE 20 MEQ/1
20 TABLET, EXTENDED RELEASE ORAL DAILY
Qty: 30 TABLET | Refills: 0 | Status: CANCELLED | OUTPATIENT
Start: 2024-04-15

## 2024-04-15 RX ORDER — FUROSEMIDE 40 MG/1
40 TABLET ORAL DAILY
Qty: 90 TABLET | Refills: 1 | Status: SHIPPED | OUTPATIENT
Start: 2024-04-15

## 2024-04-15 RX ORDER — TIRZEPATIDE 5 MG/.5ML
5 INJECTION, SOLUTION SUBCUTANEOUS WEEKLY
COMMUNITY
Start: 2024-04-10

## 2024-04-15 RX ORDER — ATORVASTATIN CALCIUM 40 MG/1
40 TABLET, FILM COATED ORAL NIGHTLY
Qty: 30 TABLET | Refills: 2 | Status: CANCELLED | OUTPATIENT
Start: 2024-04-15

## 2024-04-15 RX ORDER — METOPROLOL SUCCINATE 50 MG/1
50 TABLET, EXTENDED RELEASE ORAL DAILY
Qty: 90 TABLET | Refills: 1 | Status: SHIPPED | OUTPATIENT
Start: 2024-04-15

## 2024-04-15 RX ORDER — VENLAFAXINE HYDROCHLORIDE 150 MG/1
150 CAPSULE, EXTENDED RELEASE ORAL DAILY
Qty: 90 CAPSULE | Refills: 1 | Status: SHIPPED | OUTPATIENT
Start: 2024-04-15

## 2024-04-15 RX ORDER — VENLAFAXINE HYDROCHLORIDE 75 MG/1
75 CAPSULE, EXTENDED RELEASE ORAL DAILY
Qty: 90 CAPSULE | Refills: 1 | Status: SHIPPED | OUTPATIENT
Start: 2024-04-15

## 2024-04-15 NOTE — ASSESSMENT & PLAN NOTE
Hypertension is borderline  Continue current treatment regimen.  Weight loss.  Blood pressure will be reassessedin 3 months.

## 2024-04-15 NOTE — ASSESSMENT & PLAN NOTE
Heart failure is stable.   Continue current treatment regimen.  Heart failure will be reassessed in 3 months.

## 2024-04-15 NOTE — ASSESSMENT & PLAN NOTE
Gout is currently stable, not having any acute gout attacks.  I will check her uric acid level today with labs.  She did not tolerate allopurinol and we discussed that if she starts having gout attacks again we will try Uloric.

## 2024-04-15 NOTE — ASSESSMENT & PLAN NOTE
Vitamin D is stable, will check vitamin D level today and will continue vitamin D 5000 units once daily.

## 2024-04-15 NOTE — PROGRESS NOTES
Chief Complaint  Diabetes, Hyperlipidemia, and Hypertension    Subjective            Dang Hunt is a 58 y.o. female who presents to [facility]   History of Present Illness  She is here for follow-up.  She is here today for 3-month follow-up.    Diabetes type 2, non-insulin-dependent: She is currently on Mounjaro 5 mg.  She has had difficulty getting Mounjaro and was out of it for 2 weeks.  We tried to get her back on Ozempic but there was also unavailability of that medication.  She states she liked the Mounjaro 7.5 mg dose better but she is at least on the 5 mg. She denies any nausea, vomiting, constipation or diarrhea.     Hyperlipidemia: She is not been taking atorvastatin 40 mg daily     Gout: She did not tolerate allopurinol but states she is doing well now without any gout attacks.     Hypokalemia: Her last potassium level did improve since we stopped chlorthalidone.  She is potassium 20 mEq daily.    Hypertension: She is currently on metoprolol XL 50 mg daily.     Congestive heart failure: She is on furosemide 40 mg, takes 1/2 daily and up to 1 tablet daily depending on her weight.  She states that she is doing well with half a tablet daily.     Depression/anxiety: She is on Effexor 150 mg once daily and 75 mg once daily for total dose of 225 mg daily. She is taking BuSpar only as needed for anxiety.      Vitamin D deficiency: She is currently on vitamin D 5000 units daily.     Migraine headaches: She is currently taking Ubrelvy as needed.       PHQ-2 Depression Screening  Little interest or pleasure in doing things? 0-->not at all   Feeling down, depressed, or hopeless? 0-->not at all   PHQ-2 Total Score 0       Tobacco Use: High Risk (4/15/2024)    Patient History     Smoking Tobacco Use: Every Day     Smokeless Tobacco Use: Never     Passive Exposure: Not on file      E-cigarette/Vaping    E-cigarette/Vaping Use Never User      E-cigarette/Vaping Substances    Nicotine  "No     THC No     CBD No     Flavoring No      E-cigarette/Vaping Devices    Disposable No     Pre-filled or Refillable Cartridge No     Refillable Tank No     Pre-filled Pod No        Alcohol Use: Not on file         Objective   Vital Signs:   Vitals:    04/15/24 0808 04/15/24 0819   BP: 149/85 144/82   BP Location: Left arm    Patient Position: Sitting    Cuff Size: Adult    Pulse: 72    Temp: 97.4 °F (36.3 °C)    SpO2: 97%    Weight: 96.1 kg (211 lb 14.4 oz)    Height: 165.1 cm (65\")      Body mass index is 35.26 kg/m².    Wt Readings from Last 3 Encounters:   04/15/24 96.1 kg (211 lb 14.4 oz)   11/16/23 108 kg (239 lb 1.6 oz)   10/06/23 109 kg (240 lb 14.4 oz)     BP Readings from Last 3 Encounters:   04/15/24 144/82   11/16/23 137/77   10/06/23 110/80       Health Maintenance   Topic Date Due    DIABETIC FOOT EXAM  Never done    URINE MICROALBUMIN  02/28/2024    HEMOGLOBIN A1C  05/07/2024    Hepatitis B (1 of 3 - 19+ 3-dose series) 07/21/2024 (Originally 6/8/1984)    COVID-19 Vaccine (2 - 2023-24 season) 04/15/2025 (Originally 9/1/2023)    Pneumococcal Vaccine 0-64 (1 of 2 - PCV) 04/15/2025 (Originally [birth year]/8/1972)    TDAP/TD VACCINES (2 - Tdap) 04/15/2025 (Originally 5/24/2012)    ZOSTER VACCINE (1 of 2) 04/15/2025 (Originally 6/8/2016)    INFLUENZA VACCINE  08/01/2024    ANNUAL PHYSICAL  09/05/2024    BMI FOLLOWUP  10/06/2024    LIPID PANEL  11/07/2024    DIABETIC EYE EXAM  01/15/2025    MAMMOGRAM  09/21/2025    COLORECTAL CANCER SCREENING  10/26/2025    HEPATITIS C SCREENING  Completed    PAP SMEAR  Discontinued    LUNG CANCER SCREENING  Discontinued       /82   Pulse 72   Temp 97.4 °F (36.3 °C)   Ht 165.1 cm (65\")   Wt 96.1 kg (211 lb 14.4 oz)   SpO2 97%   BMI 35.26 kg/m²       Current Outpatient Medications:     busPIRone (BUSPAR) 15 MG tablet, TAKE ONE TABLET BY MOUTH THREE TIMES DAILY AS NEEDED, Disp: 90 tablet, Rfl: 1    fluticasone-salmeterol (Advair HFA) 230-21 MCG/ACT inhaler, Inhale 2 puffs " 2 (Two) Times a Day., Disp: 1 each, Rfl: 11    furosemide (LASIX) 40 MG tablet, Take 1 tablet by mouth Daily., Disp: 90 tablet, Rfl: 1    gabapentin (NEURONTIN) 100 MG capsule, Every 12 (Twelve) Hours., Disp: , Rfl:     glucose monitor monitoring kit, Check blood sugar twice daily., Disp: 1 each, Rfl: 0    ketotifen (ZADITOR) 0.025 % ophthalmic solution, Administer 1 drop to both eyes 2 (Two) Times a Day., Disp: 10 mL, Rfl: 5    metoprolol succinate XL (TOPROL-XL) 50 MG 24 hr tablet, Take 1 tablet by mouth Daily., Disp: 90 tablet, Rfl: 1    montelukast (SINGULAIR) 10 MG tablet, TAKE ONE TABLET BY MOUTH ONCE DAILY IN THE MORNING, Disp: 90 tablet, Rfl: 3    Mounjaro 5 MG/0.5ML solution pen-injector pen, Inject 0.5 mL under the skin into the appropriate area as directed 1 (One) Time Per Week., Disp: , Rfl:     oxyCODONE-acetaminophen (PERCOCET) 5-325 MG per tablet, Take 1 tablet by mouth Every 8 (Eight) Hours As Needed., Disp: , Rfl:     potassium chloride (KLOR-CON M20) 20 MEQ CR tablet, TAKE ONE TABLET BY MOUTH DAILY, Disp: 30 tablet, Rfl: 0    Symproic 0.2 MG tablet, Take 1 tablet by mouth Daily., Disp: , Rfl:     ubrogepant 100 MG tablet, Take 1 tablet by mouth 1 (One) Time As Needed (migraine)., Disp: 8 tablet, Rfl: 2    venlafaxine XR (EFFEXOR-XR) 150 MG 24 hr capsule, Take 1 capsule by mouth Daily., Disp: 90 capsule, Rfl: 1    venlafaxine XR (EFFEXOR-XR) 75 MG 24 hr capsule, Take 1 capsule by mouth Daily., Disp: 90 capsule, Rfl: 1    Ventolin  (90 Base) MCG/ACT inhaler, INHALE 2 PUFFS BY MOUTH EVERY 4 HOURS AS NEEDED FOR WHEEZING OR SHORTNESS OF BREATH, Disp: 18 g, Rfl: 6    vitamin D3 125 MCG (5000 UT) capsule capsule, Take 1 capsule by mouth Daily., Disp: 90 capsule, Rfl: 1    atorvastatin (LIPITOR) 40 MG tablet, Take 1 tablet by mouth Every Night., Disp: 30 tablet, Rfl: 2    [START ON 5/7/2024] Tirzepatide (MOUNJARO) 7.5 MG/0.5ML solution pen-injector pen, Inject 0.5 mL under the skin into the  appropriate area as directed 1 (One) Time Per Week. Indications: Type 2 Diabetes, Disp: 3 mL, Rfl: 2   Past Medical History:   Diagnosis Date    Anxiety     Arthritis     Asthma     CHF (congestive heart failure)     Chronic diastolic CHF (congestive heart failure) 06/10/2022    Colitis     COPD (chronic obstructive pulmonary disease)     Depression     Dysphagia     Edema     Fatigue     GERD (gastroesophageal reflux disease)     History of acute pancreatitis     History of histoplasmosis     HTN (hypertension)     Hyperlipidemia     Inflammatory bowel disease     Irritable bowel syndrome     Light headed     Low back pain     Migraines     Multiple joint pain     On home oxygen therapy     2 L AT NIGHT    MANI (obstructive sleep apnea)     WEARS CPAP    Pancreatitis     RLS (restless legs syndrome)     Type 2 diabetes mellitus without complication, without long-term current use of insulin 01/06/2023        Physical Exam  Vitals reviewed.   Constitutional:       Appearance: Normal appearance. She is well-developed. She is obese.   Neck:      Thyroid: No thyroid mass, thyromegaly or thyroid tenderness.   Cardiovascular:      Rate and Rhythm: Normal rate and regular rhythm.      Heart sounds: No murmur heard.     No friction rub. No gallop.   Pulmonary:      Effort: Pulmonary effort is normal.      Breath sounds: Normal breath sounds. No wheezing or rhonchi.   Lymphadenopathy:      Cervical: No cervical adenopathy.   Skin:     General: Skin is warm and dry.   Neurological:      Mental Status: She is alert and oriented to person, place, and time.      Cranial Nerves: No cranial nerve deficit.   Psychiatric:         Mood and Affect: Mood and affect normal.         Behavior: Behavior normal.         Thought Content: Thought content normal. Thought content does not include homicidal or suicidal ideation.         Judgment: Judgment normal.          Result Review :    The following data was reviewed by: Maria De Jesus Narayanan,  APRN on 04/15/2024:  Common Labs   Common labs          9/5/2023    07:51 11/7/2023    08:54 11/16/2023    10:07   Common Labs   Glucose 93  84  81    BUN 13  14  13    Creatinine 1.14  1.22  1.01    Sodium 140  141  141    Potassium 3.1  3.0  3.6    Chloride 99  100  104    Calcium 10.1  10.0  9.6    Albumin  4.1     Total Bilirubin  0.4     Alkaline Phosphatase  111     AST (SGOT)  14     ALT (SGPT)  22     Total Cholesterol  196     Triglycerides  305     HDL Cholesterol  32     LDL Cholesterol   111     Hemoglobin A1C  5.80     Uric Acid  7.6       VITD   Lab Results   Component Value Date    [id number] 76.2 11/07/2023     Assessment & Plan  Type 2 diabetes mellitus without complication, without long-term current use of insulin  Diabetes is stable.   Continue current treatment regimen.  She is currently taking Mounjaro 5 mg weekly and tolerating well.  I will go ahead and send in the 7.5 mg dose for her to start on next month.  Diabetes will be reassessed in 3 months  Anxiety  She is currently stable on Effexor 225 mg daily.  Moderate episode of recurrent major depressive disorder  Patient's depression is a recurrent episode that is moderate without psychosis. Depression is active and stable.    Plan:   Continue current medication therapy     Followup at the next regular appointment.   Congestive heart failure, unspecified HF chronicity, unspecified heart failure type  Heart failure is stable.   Continue current treatment regimen.  Heart failure will be reassessed in 3 months.  Hypokalemia  She is currently stable on potassium 20 mill equivalents daily, will continue current dose.  Mixed hyperlipidemia   Lipid abnormalities are stable    Plan:  Continue same medication/s without change.      Discussed medication dosage, use, side effects, and goals of treatment in detail.    Counseled patient on lifestyle modifications to help control hyperlipidemia.     Patient Treatment Goals:   LDL goal is less than  70    Followup in 6 months.  Primary hypertension  Hypertension is borderline  Continue current treatment regimen.  Weight loss.  Blood pressure will be reassessedin 3 months.  Vitamin D deficiency  Vitamin D is stable, will check vitamin D level today and will continue vitamin D 5000 units once daily.  Chronic gout without tophus, unspecified cause, unspecified site  Gout is currently stable, not having any acute gout attacks.  I will check her uric acid level today with labs.  She did not tolerate allopurinol and we discussed that if she starts having gout attacks again we will try Uloric.  Medication monitoring encounter      Orders Placed This Encounter   Procedures    Vitamin D,25-Hydroxy    MicroAlbumin, Urine, Random - Urine, Clean Catch    TSH Rfx On Abnormal To Free T4    CBC Auto Differential    Comprehensive Metabolic Panel    Lipid Panel    Hemoglobin A1c    Uric Acid    Lipase     New Medications Ordered This Visit   Medications    venlafaxine XR (EFFEXOR-XR) 150 MG 24 hr capsule     Sig: Take 1 capsule by mouth Daily.     Dispense:  90 capsule     Refill:  1    furosemide (LASIX) 40 MG tablet     Sig: Take 1 tablet by mouth Daily.     Dispense:  90 tablet     Refill:  1    venlafaxine XR (EFFEXOR-XR) 75 MG 24 hr capsule     Sig: Take 1 capsule by mouth Daily.     Dispense:  90 capsule     Refill:  1     Takes with 150 mg    metoprolol succinate XL (TOPROL-XL) 50 MG 24 hr tablet     Sig: Take 1 tablet by mouth Daily.     Dispense:  90 tablet     Refill:  1    vitamin D3 125 MCG (5000 UT) capsule capsule     Sig: Take 1 capsule by mouth Daily.     Dispense:  90 capsule     Refill:  1    Tirzepatide (MOUNJARO) 7.5 MG/0.5ML solution pen-injector pen     Sig: Inject 0.5 mL under the skin into the appropriate area as directed 1 (One) Time Per Week. Indications: Type 2 Diabetes     Dispense:  3 mL     Refill:  2        Diagnosis Plan   1. Type 2 diabetes mellitus without complication, without long-term  current use of insulin  MicroAlbumin, Urine, Random - Urine, Clean Catch    TSH Rfx On Abnormal To Free T4    CBC Auto Differential    Hemoglobin A1c    Lipase      2. Anxiety  venlafaxine XR (EFFEXOR-XR) 150 MG 24 hr capsule      3. Moderate episode of recurrent major depressive disorder  venlafaxine XR (EFFEXOR-XR) 150 MG 24 hr capsule    venlafaxine XR (EFFEXOR-XR) 75 MG 24 hr capsule      4. Congestive heart failure, unspecified HF chronicity, unspecified heart failure type  furosemide (LASIX) 40 MG tablet      5. Hypokalemia        6. Mixed hyperlipidemia  TSH Rfx On Abnormal To Free T4    Comprehensive Metabolic Panel    Lipid Panel      7. Primary hypertension  metoprolol succinate XL (TOPROL-XL) 50 MG 24 hr tablet      8. Vitamin D deficiency  Vitamin D,25-Hydroxy    vitamin D3 125 MCG (5000 UT) capsule capsule      9. Chronic gout without tophus, unspecified cause, unspecified site  Uric Acid      10. Medication monitoring encounter  Lipase            FOLLOW UP  Return in about 3 months (around 7/15/2024) for 30 min apt for complex pt, diab foot exam.  Patient was given instructions and counseling regarding her condition or for health maintenance advice. Please see specific information pulled into the AVS if appropriate.       CURRENT & DISCONTINUED MEDICATIONS  Current Outpatient Medications   Medication Instructions    atorvastatin (LIPITOR) 40 mg, Oral, Nightly    busPIRone (BUSPAR) 15 MG tablet TAKE ONE TABLET BY MOUTH THREE TIMES DAILY AS NEEDED    fluticasone-salmeterol (Advair HFA) 230-21 MCG/ACT inhaler 2 puffs, Inhalation, 2 Times Daily - RT    furosemide (LASIX) 40 mg, Oral, Daily    gabapentin (NEURONTIN) 100 MG capsule Every 12 Hours Scheduled    glucose monitor monitoring kit Check blood sugar twice daily.    ketotifen (ZADITOR) 0.025 % ophthalmic solution 1 drop, Both Eyes, 2 Times Daily    metoprolol succinate XL (TOPROL-XL) 50 mg, Oral, Daily    montelukast (SINGULAIR) 10 mg, Oral, Every  Morning    Mounjaro 5 mg, Subcutaneous, Weekly    oxyCODONE-acetaminophen (PERCOCET) 5-325 MG per tablet 1 tablet, Oral, Every 8 Hours PRN    potassium chloride (KLOR-CON M20) 20 MEQ CR tablet 20 mEq, Oral, Daily    Symproic 0.2 MG tablet 1 tablet, Oral, Daily    [START ON 5/7/2024] Tirzepatide (MOUNJARO) 7.5 mg, Subcutaneous, Weekly    ubrogepant (UBRELVY) 100 mg, Oral, Once As Needed    venlafaxine XR (EFFEXOR-XR) 150 mg, Oral, Daily    venlafaxine XR (EFFEXOR-XR) 75 mg, Oral, Daily    Ventolin  (90 Base) MCG/ACT inhaler 2 puffs, Every 4 Hours PRN    vitamin D3 5,000 Units, Oral, Daily       Medications Discontinued During This Encounter   Medication Reason    Semaglutide, 2 MG/DOSE, (OZEMPIC) 8 MG/3ML solution pen-injector Availability    allopurinol (ZYLOPRIM) 300 MG tablet Other- See Medication Note    venlafaxine XR (EFFEXOR-XR) 150 MG 24 hr capsule Reorder    metoprolol succinate XL (TOPROL-XL) 50 MG 24 hr tablet Reorder    vitamin D3 125 MCG (5000 UT) capsule capsule Reorder    venlafaxine XR (EFFEXOR-XR) 75 MG 24 hr capsule Reorder    furosemide (LASIX) 40 MG tablet Reorder        Parts of this note are electronic transcriptions/translations of spoken language to printed text using the Dragon Dictation system.    Maria De Jesus Narayanan, APRN  04/15/24  09:55 EDT

## 2024-04-15 NOTE — ASSESSMENT & PLAN NOTE
Diabetes is stable.   Continue current treatment regimen.  She is currently taking Mounjaro 5 mg weekly and tolerating well.  I will go ahead and send in the 7.5 mg dose for her to start on next month.  Diabetes will be reassessed in 3 months

## 2024-04-19 ENCOUNTER — PATIENT ROUNDING (BHMG ONLY) (OUTPATIENT)
Dept: FAMILY MEDICINE CLINIC | Facility: CLINIC | Age: 59
End: 2024-04-19
Payer: COMMERCIAL

## 2024-04-26 ENCOUNTER — TELEPHONE (OUTPATIENT)
Dept: PULMONOLOGY | Facility: CLINIC | Age: 59
End: 2024-04-26

## 2024-04-26 DIAGNOSIS — F17.210 SMOKING GREATER THAN 40 PACK YEARS: Primary | ICD-10-CM

## 2024-04-26 NOTE — TELEPHONE ENCOUNTER
Caller: Dang Hunt    Relationship to patient: Self    Best call back number:     [phone]        Patient is needing: PT IS NEEDING A CT SCAN OF THE CHEST ORDER PLACED. PLEASE ADVISE

## 2024-05-02 ENCOUNTER — OFFICE VISIT (OUTPATIENT)
Dept: FAMILY MEDICINE CLINIC | Facility: CLINIC | Age: 59
End: 2024-05-02
Payer: COMMERCIAL

## 2024-05-02 VITALS
HEIGHT: 65 IN | DIASTOLIC BLOOD PRESSURE: 100 MMHG | TEMPERATURE: 98.3 F | OXYGEN SATURATION: 95 % | BODY MASS INDEX: 35.26 KG/M2 | SYSTOLIC BLOOD PRESSURE: 144 MMHG | HEART RATE: 75 BPM

## 2024-05-02 DIAGNOSIS — J45.41 MODERATE PERSISTENT ASTHMA WITH ACUTE EXACERBATION: Primary | ICD-10-CM

## 2024-05-02 DIAGNOSIS — R09.81 NASAL CONGESTION: ICD-10-CM

## 2024-05-02 DIAGNOSIS — E11.9 TYPE 2 DIABETES MELLITUS WITHOUT COMPLICATION, WITHOUT LONG-TERM CURRENT USE OF INSULIN: ICD-10-CM

## 2024-05-02 DIAGNOSIS — J06.9 UPPER RESPIRATORY INFECTION WITH COUGH AND CONGESTION: ICD-10-CM

## 2024-05-02 DIAGNOSIS — I10 PRIMARY HYPERTENSION: ICD-10-CM

## 2024-05-02 PROBLEM — M47.817 LUMBOSACRAL SPONDYLOSIS WITHOUT MYELOPATHY: Status: ACTIVE | Noted: 2023-12-06

## 2024-05-02 LAB
EXPIRATION DATE: NORMAL
FLUAV AG UPPER RESP QL IA.RAPID: NOT DETECTED
FLUBV AG UPPER RESP QL IA.RAPID: NOT DETECTED
INTERNAL CONTROL: NORMAL
Lab: NORMAL
SARS-COV-2 AG UPPER RESP QL IA.RAPID: NOT DETECTED

## 2024-05-02 PROCEDURE — 99214 OFFICE O/P EST MOD 30 MIN: CPT | Performed by: NURSE PRACTITIONER

## 2024-05-02 PROCEDURE — 87428 SARSCOV & INF VIR A&B AG IA: CPT | Performed by: NURSE PRACTITIONER

## 2024-05-02 RX ORDER — AZITHROMYCIN 250 MG/1
TABLET, FILM COATED ORAL
Qty: 6 TABLET | Refills: 0 | Status: SHIPPED | OUTPATIENT
Start: 2024-05-02

## 2024-05-02 RX ORDER — TIRZEPATIDE 5 MG/.5ML
5 INJECTION, SOLUTION SUBCUTANEOUS WEEKLY
Qty: 2 ML | Refills: 2 | Status: SHIPPED | OUTPATIENT
Start: 2024-05-02

## 2024-05-02 RX ORDER — PREDNISONE 20 MG/1
TABLET ORAL
Qty: 12 TABLET | Refills: 0 | Status: SHIPPED | OUTPATIENT
Start: 2024-05-02 | End: 2024-05-08

## 2024-05-02 RX ORDER — ALBUTEROL SULFATE 90 UG/1
2 AEROSOL, METERED RESPIRATORY (INHALATION) EVERY 4 HOURS PRN
COMMUNITY

## 2024-05-02 RX ORDER — BENZONATATE 200 MG/1
200 CAPSULE ORAL 3 TIMES DAILY PRN
Qty: 21 CAPSULE | Refills: 0 | Status: SHIPPED | OUTPATIENT
Start: 2024-05-02 | End: 2024-05-09

## 2024-05-02 RX ORDER — LISINOPRIL 10 MG/1
10 TABLET ORAL DAILY
Qty: 30 TABLET | Refills: 2 | Status: SHIPPED | OUTPATIENT
Start: 2024-05-02

## 2024-05-02 NOTE — PROGRESS NOTES
"Chief Complaint  Nasal Congestion, Cough, and Diarrhea    Subjective            Dang Hunt is a 58 y.o. female who presents to [facility]   History of Present Illness  She is here today for an acute visit.  She states she has been having cough and congestion for over a week now.  She states she has missed work all week.  She states it started with her nose and now it in her chest.  She states her chest feels tight.  She states she did have diarrhea 1 day.  She has mostly cough and congestion in her chest.  She did a home COVID test which was negative.  COVID and flu test in the office today were both negative.    Hypertension: Her blood pressure is high today.  Heart rate is at 75.  She is on metoprolol XL 50 mg daily.    Diabetes type 2, non-insulin-dependent: She is doing well on Mounjaro 5 mg and wants to continue on that dose.      Tobacco Use: High Risk (5/2/2024)    Patient History     Smoking Tobacco Use: Every Day     Smokeless Tobacco Use: Never     Passive Exposure: Not on file      E-cigarette/Vaping    E-cigarette/Vaping Use Never User      E-cigarette/Vaping Substances    Nicotine No     THC No     CBD No     Flavoring No      E-cigarette/Vaping Devices    Disposable No     Pre-filled or Refillable Cartridge No     Refillable Tank No     Pre-filled Pod No        Alcohol Use: Not on file         Objective   Vital Signs:   Vitals:    05/02/24 0952 05/02/24 0953   BP: 160/100 144/100   Pulse: 75    Temp: 98.3 °F (36.8 °C)    SpO2: 95%    Height: 165.1 cm (65\")      Body mass index is 35.26 kg/m².    Wt Readings from Last 3 Encounters:   04/15/24 96.1 kg (211 lb 14.4 oz)   11/16/23 108 kg (239 lb 1.6 oz)   10/06/23 109 kg (240 lb 14.4 oz)     BP Readings from Last 3 Encounters:   05/02/24 144/100   04/15/24 144/82   11/16/23 137/77       Health Maintenance   Topic Date Due    DIABETIC FOOT EXAM  Never done    Hepatitis B (1 of 3 - 19+ 3-dose series) 07/21/2024 " "(Originally 6/8/1985)    COVID-19 Vaccine (2 - 2023-24 season) 04/15/2025 (Originally 9/1/2023)    Pneumococcal Vaccine 0-64 (1 of 2 - PCV) 04/15/2025 (Originally 6/8/1972)    TDAP/TD VACCINES (2 - Tdap) 04/15/2025 (Originally 5/24/2012)    ZOSTER VACCINE (1 of 2) 04/15/2025 (Originally 6/8/2016)    INFLUENZA VACCINE  08/01/2024    ANNUAL PHYSICAL  09/05/2024    BMI FOLLOWUP  10/06/2024    HEMOGLOBIN A1C  10/15/2024    DIABETIC EYE EXAM  01/15/2025    LIPID PANEL  04/15/2025    URINE MICROALBUMIN  04/15/2025    MAMMOGRAM  09/21/2025    COLORECTAL CANCER SCREENING  10/26/2025    HEPATITIS C SCREENING  Completed    PAP SMEAR  Discontinued    LUNG CANCER SCREENING  Discontinued       /100   Pulse 75   Temp 98.3 °F (36.8 °C)   Ht 165.1 cm (65\")   SpO2 95%   BMI 35.26 kg/m²       Current Outpatient Medications:     atorvastatin (LIPITOR) 40 MG tablet, Take 1 tablet by mouth Every Night., Disp: 30 tablet, Rfl: 2    busPIRone (BUSPAR) 15 MG tablet, TAKE ONE TABLET BY MOUTH THREE TIMES DAILY AS NEEDED, Disp: 90 tablet, Rfl: 1    fluticasone-salmeterol (Advair HFA) 230-21 MCG/ACT inhaler, Inhale 2 puffs 2 (Two) Times a Day., Disp: 1 each, Rfl: 11    furosemide (LASIX) 40 MG tablet, Take 1 tablet by mouth Daily., Disp: 90 tablet, Rfl: 1    gabapentin (NEURONTIN) 100 MG capsule, Every 12 (Twelve) Hours., Disp: , Rfl:     glucose monitor monitoring kit, Check blood sugar twice daily., Disp: 1 each, Rfl: 0    ketotifen (ZADITOR) 0.025 % ophthalmic solution, Administer 1 drop to both eyes 2 (Two) Times a Day., Disp: 10 mL, Rfl: 5    metoprolol succinate XL (TOPROL-XL) 50 MG 24 hr tablet, Take 1 tablet by mouth Daily., Disp: 90 tablet, Rfl: 1    montelukast (SINGULAIR) 10 MG tablet, TAKE ONE TABLET BY MOUTH ONCE DAILY IN THE MORNING, Disp: 90 tablet, Rfl: 3    Mounjaro 5 MG/0.5ML solution pen-injector pen, Inject 0.5 mL under the skin into the appropriate area as directed 1 (One) Time Per Week. Indications: Type 2 " Diabetes, Disp: 2 mL, Rfl: 2    oxyCODONE-acetaminophen (PERCOCET) 5-325 MG per tablet, Take 1 tablet by mouth Every 8 (Eight) Hours As Needed., Disp: , Rfl:     potassium chloride (KLOR-CON M20) 20 MEQ CR tablet, TAKE ONE TABLET BY MOUTH DAILY, Disp: 30 tablet, Rfl: 0    Symproic 0.2 MG tablet, Take 1 tablet by mouth Daily., Disp: , Rfl:     ubrogepant 100 MG tablet, Take 1 tablet by mouth 1 (One) Time As Needed (migraine)., Disp: 8 tablet, Rfl: 2    venlafaxine XR (EFFEXOR-XR) 150 MG 24 hr capsule, Take 1 capsule by mouth Daily., Disp: 90 capsule, Rfl: 1    venlafaxine XR (EFFEXOR-XR) 75 MG 24 hr capsule, Take 1 capsule by mouth Daily., Disp: 90 capsule, Rfl: 1    Ventolin  (90 Base) MCG/ACT inhaler, INHALE 2 PUFFS BY MOUTH EVERY 4 HOURS AS NEEDED FOR WHEEZING OR SHORTNESS OF BREATH, Disp: 18 g, Rfl: 6    vitamin D3 125 MCG (5000 UT) capsule capsule, Take 1 capsule by mouth Daily., Disp: 90 capsule, Rfl: 1    albuterol sulfate  (90 Base) MCG/ACT inhaler, Inhale 2 puffs Every 4 (Four) Hours As Needed for Wheezing., Disp: , Rfl:     azithromycin (Zithromax Z-Erik) 250 MG tablet, Take 2 tablets by mouth on day 1, then 1 tablet daily on days 2-5, Disp: 6 tablet, Rfl: 0    benzonatate (TESSALON) 200 MG capsule, Take 1 capsule by mouth 3 (Three) Times a Day As Needed for Cough for up to 7 days., Disp: 21 capsule, Rfl: 0    lisinopril (PRINIVIL,ZESTRIL) 10 MG tablet, Take 1 tablet by mouth Daily. Indications: High Blood Pressure Disorder, Disp: 30 tablet, Rfl: 2    predniSONE (DELTASONE) 20 MG tablet, Take 3 tablets by mouth Daily for 2 days, THEN 2 tablets Daily for 2 days, THEN 1 tablet Daily for 2 days. Indications: Asthma, Disp: 12 tablet, Rfl: 0   Past Medical History:   Diagnosis Date    Anxiety     Arthritis     Asthma     CHF (congestive heart failure)     Chronic diastolic CHF (congestive heart failure) 06/10/2022    Colitis     COPD (chronic obstructive pulmonary disease)     Depression      Dysphagia     Edema     Fatigue     GERD (gastroesophageal reflux disease)     History of acute pancreatitis     History of histoplasmosis     HTN (hypertension)     Hyperlipidemia     Inflammatory bowel disease     Irritable bowel syndrome     Light headed     Low back pain     Migraines     Multiple joint pain     On home oxygen therapy     2 L AT NIGHT    MANI (obstructive sleep apnea)     WEARS CPAP    Pancreatitis     RLS (restless legs syndrome)     Type 2 diabetes mellitus without complication, without long-term current use of insulin 01/06/2023        Physical Exam  Vitals reviewed.   Constitutional:       Appearance: Normal appearance. She is well-developed. She is obese.   HENT:      Right Ear: Tympanic membrane, ear canal and external ear normal.      Left Ear: Tympanic membrane, ear canal and external ear normal.      Mouth/Throat:      Mouth: Mucous membranes are moist.      Pharynx: Posterior oropharyngeal erythema present. No pharyngeal swelling or oropharyngeal exudate.   Neck:      Thyroid: No thyroid mass, thyromegaly or thyroid tenderness.   Cardiovascular:      Rate and Rhythm: Normal rate and regular rhythm.      Heart sounds: No murmur heard.     No friction rub. No gallop.   Pulmonary:      Effort: Pulmonary effort is normal.      Breath sounds: Wheezing present. No rhonchi.      Comments: Tight wheezing noted throughout all lung fields.  Lymphadenopathy:      Cervical: No cervical adenopathy.   Skin:     General: Skin is warm and dry.   Neurological:      Mental Status: She is alert and oriented to person, place, and time.      Cranial Nerves: No cranial nerve deficit.   Psychiatric:         Mood and Affect: Mood and affect normal.         Behavior: Behavior normal.         Thought Content: Thought content normal. Thought content does not include homicidal or suicidal ideation.         Judgment: Judgment normal.          Result Review :    The following data was reviewed by: Maria De Jesus FOX  ROSHNI Narayanan on 05/02/2024:  POC COVID/FLU   Lab Results   Component Value Date    SARSANTIGEN Not Detected 05/02/2024    FLUAAG Not Detected 05/02/2024    FLUBAG Not Detected 05/02/2024    INTCT Passed 05/02/2024    DARIANA [id number] 05/02/2024    EXPIRATDTE 2/5/25 05/02/2024      Assessment & Plan  Moderate persistent asthma with acute exacerbation  Asthma is worsening.  The patient is experiencing  and she is experiencing .    Plan:  I will start her on prednisone Dosepak.  Advised to use her albuterol inhaler..    Discussed medication dosage, use, side effects, and goals of treatment in detail.    Discussed distinction between quick-relief and maintenance control medications.  Discussed monitoring symptoms and use of quick-relief medications and contacting provider early in the course of exacerbations.  Warning signs of respiratory distress were reviewed with the patient.     Patient Treatment Goals: symptom prevention, minimizing limitation in activity, prevention of exacerbations and use of ER/inpatient care, maintenance of optimal pulmonary function, and minimization of adverse effects of treatment.    Followup at the next regular appointment.  Upper respiratory infection with cough and congestion  Her symptoms have been ongoing for over 10 days so I am going to prescribe her Z-Erik as well as prednisone Dosepak.  Tessalon Perles as needed for cough.  Primary hypertension  Hypertension is borderline  Medication changes per orders.  I am going to start her on lisinopril 10 mg daily.  Advised her of potential side effect of cough and to notify me if she has any problems/side effects.  She is to continue metoprolol XL 50 mg daily.  Blood pressure will be reassessedat the next regular appointment.  Type 2 diabetes mellitus without complication, without long-term current use of insulin  Diabetes is stable.   Continue current treatment regimen.  Diabetes will be reassessed in 3 months  Nasal  congestion      Orders Placed This Encounter   Procedures    POCT SARS-CoV-2 Antigen LEVAR + Flu     New Medications Ordered This Visit   Medications    Mounjaro 5 MG/0.5ML solution pen-injector pen     Sig: Inject 0.5 mL under the skin into the appropriate area as directed 1 (One) Time Per Week. Indications: Type 2 Diabetes     Dispense:  2 mL     Refill:  2     Hold until refill    lisinopril (PRINIVIL,ZESTRIL) 10 MG tablet     Sig: Take 1 tablet by mouth Daily. Indications: High Blood Pressure Disorder     Dispense:  30 tablet     Refill:  2    predniSONE (DELTASONE) 20 MG tablet     Sig: Take 3 tablets by mouth Daily for 2 days, THEN 2 tablets Daily for 2 days, THEN 1 tablet Daily for 2 days. Indications: Asthma     Dispense:  12 tablet     Refill:  0    azithromycin (Zithromax Z-Erik) 250 MG tablet     Sig: Take 2 tablets by mouth on day 1, then 1 tablet daily on days 2-5     Dispense:  6 tablet     Refill:  0    benzonatate (TESSALON) 200 MG capsule     Sig: Take 1 capsule by mouth 3 (Three) Times a Day As Needed for Cough for up to 7 days.     Dispense:  21 capsule     Refill:  0           Diagnosis Plan   1. Moderate persistent asthma with acute exacerbation  predniSONE (DELTASONE) 20 MG tablet    azithromycin (Zithromax Z-Erik) 250 MG tablet      2. Upper respiratory infection with cough and congestion  azithromycin (Zithromax Z-Erik) 250 MG tablet    benzonatate (TESSALON) 200 MG capsule      3. Primary hypertension  lisinopril (PRINIVIL,ZESTRIL) 10 MG tablet      4. Type 2 diabetes mellitus without complication, without long-term current use of insulin  Mounjaro 5 MG/0.5ML solution pen-injector pen      5. Nasal congestion  POCT SARS-CoV-2 Antigen LEVAR + Flu            FOLLOW UP  Return if symptoms worsen or fail to improve.  Patient was given instructions and counseling regarding her condition or for health maintenance advice. Please see specific information pulled into the AVS if appropriate.       CURRENT &  DISCONTINUED MEDICATIONS  Current Outpatient Medications   Medication Instructions    albuterol sulfate  (90 Base) MCG/ACT inhaler 2 puffs, Inhalation, Every 4 Hours PRN    atorvastatin (LIPITOR) 40 mg, Oral, Nightly    azithromycin (Zithromax Z-Erik) 250 MG tablet Take 2 tablets by mouth on day 1, then 1 tablet daily on days 2-5    benzonatate (TESSALON) 200 mg, Oral, 3 Times Daily PRN    busPIRone (BUSPAR) 15 MG tablet TAKE ONE TABLET BY MOUTH THREE TIMES DAILY AS NEEDED    fluticasone-salmeterol (Advair HFA) 230-21 MCG/ACT inhaler 2 puffs, Inhalation, 2 Times Daily - RT    furosemide (LASIX) 40 mg, Oral, Daily    gabapentin (NEURONTIN) 100 MG capsule Every 12 Hours Scheduled    glucose monitor monitoring kit Check blood sugar twice daily.    ketotifen (ZADITOR) 0.025 % ophthalmic solution 1 drop, Both Eyes, 2 Times Daily    lisinopril (PRINIVIL,ZESTRIL) 10 mg, Oral, Daily    metoprolol succinate XL (TOPROL-XL) 50 mg, Oral, Daily    montelukast (SINGULAIR) 10 mg, Oral, Every Morning    Mounjaro 5 mg, Subcutaneous, Weekly    oxyCODONE-acetaminophen (PERCOCET) 5-325 MG per tablet 1 tablet, Oral, Every 8 Hours PRN    potassium chloride (KLOR-CON M20) 20 MEQ CR tablet 20 mEq, Oral, Daily    predniSONE (DELTASONE) 20 MG tablet Take 3 tablets by mouth Daily for 2 days, THEN 2 tablets Daily for 2 days, THEN 1 tablet Daily for 2 days. Indications: Asthma    Symproic 0.2 MG tablet 1 tablet, Oral, Daily    ubrogepant (UBRELVY) 100 mg, Oral, Once As Needed    venlafaxine XR (EFFEXOR-XR) 150 mg, Oral, Daily    venlafaxine XR (EFFEXOR-XR) 75 mg, Oral, Daily    Ventolin  (90 Base) MCG/ACT inhaler 2 puffs, Every 4 Hours PRN    vitamin D3 5,000 Units, Oral, Daily       Medications Discontinued During This Encounter   Medication Reason    Tirzepatide (MOUNJARO) 7.5 MG/0.5ML solution pen-injector pen Duplicate order    Mounjaro 5 MG/0.5ML solution pen-injector pen Reorder        Parts of this note are electronic  transcriptions/translations of spoken language to printed text using the Dragon Dictation system.    ROSHNI Varma  05/02/24  16:33 EDT

## 2024-05-02 NOTE — ASSESSMENT & PLAN NOTE
Hypertension is borderline  Medication changes per orders.  I am going to start her on lisinopril 10 mg daily.  Advised her of potential side effect of cough and to notify me if she has any problems/side effects.  She is to continue metoprolol XL 50 mg daily.  Blood pressure will be reassessedat the next regular appointment.

## 2024-05-02 NOTE — LETTER
May 2, 2024     Patient: Dang Hunt   YOB: 1965   Date of Visit: 5/2/2024       To Whom It May Concern:    It is my medical opinion that Dang Hunt may return to work on 5/6/2024.            Sincerely,        ROSHNI Varma    CC: No Recipients

## 2024-05-06 DIAGNOSIS — E78.2 MIXED HYPERLIPIDEMIA: ICD-10-CM

## 2024-05-06 DIAGNOSIS — E87.6 HYPOKALEMIA: ICD-10-CM

## 2024-05-06 DIAGNOSIS — M1A.9XX0 CHRONIC GOUT WITHOUT TOPHUS, UNSPECIFIED CAUSE, UNSPECIFIED SITE: ICD-10-CM

## 2024-05-06 RX ORDER — ATORVASTATIN CALCIUM 40 MG/1
40 TABLET, FILM COATED ORAL
Qty: 30 TABLET | Refills: 2 | Status: SHIPPED | OUTPATIENT
Start: 2024-05-06

## 2024-05-06 RX ORDER — POTASSIUM CHLORIDE 20 MEQ/1
20 TABLET, EXTENDED RELEASE ORAL DAILY
Qty: 30 TABLET | Refills: 2 | OUTPATIENT
Start: 2024-05-06

## 2024-05-06 RX ORDER — ALLOPURINOL 300 MG/1
300 TABLET ORAL DAILY
Qty: 30 TABLET | Refills: 2 | OUTPATIENT
Start: 2024-05-06

## 2024-05-07 ENCOUNTER — HOSPITAL ENCOUNTER (OUTPATIENT)
Dept: CT IMAGING | Facility: HOSPITAL | Age: 59
Discharge: HOME | End: 2024-05-07
Admitting: INTERNAL MEDICINE
Payer: COMMERCIAL

## 2024-05-07 DIAGNOSIS — F17.210 SMOKING GREATER THAN 40 PACK YEARS: ICD-10-CM

## 2024-05-07 PROCEDURE — 71271 CT THORAX LUNG CANCER SCR C-: CPT

## 2024-07-05 DIAGNOSIS — E11.9 TYPE 2 DIABETES MELLITUS WITHOUT COMPLICATION, WITHOUT LONG-TERM CURRENT USE OF INSULIN: ICD-10-CM

## 2024-07-05 RX ORDER — TIRZEPATIDE 5 MG/.5ML
5 INJECTION, SOLUTION SUBCUTANEOUS WEEKLY
Qty: 2 ML | Refills: 0 | Status: SHIPPED | OUTPATIENT
Start: 2024-07-05

## 2024-07-05 NOTE — TELEPHONE ENCOUNTER
I sent in one month Rx. She will be due for her 3 month follow up with me in about 2 weeks so she needs to schedule an apt.

## 2024-07-09 DIAGNOSIS — I10 PRIMARY HYPERTENSION: ICD-10-CM

## 2024-07-09 RX ORDER — LISINOPRIL 10 MG/1
TABLET ORAL
Qty: 30 TABLET | Refills: 2 | Status: SHIPPED | OUTPATIENT
Start: 2024-07-09

## 2024-08-09 ENCOUNTER — OFFICE VISIT (OUTPATIENT)
Dept: FAMILY MEDICINE CLINIC | Facility: CLINIC | Age: 59
End: 2024-08-09
Payer: COMMERCIAL

## 2024-08-09 VITALS
SYSTOLIC BLOOD PRESSURE: 142 MMHG | TEMPERATURE: 96.5 F | WEIGHT: 195 LBS | OXYGEN SATURATION: 98 % | HEIGHT: 65 IN | DIASTOLIC BLOOD PRESSURE: 86 MMHG | BODY MASS INDEX: 32.49 KG/M2 | HEART RATE: 71 BPM

## 2024-08-09 DIAGNOSIS — E55.9 VITAMIN D DEFICIENCY: ICD-10-CM

## 2024-08-09 DIAGNOSIS — F33.1 MODERATE EPISODE OF RECURRENT MAJOR DEPRESSIVE DISORDER: ICD-10-CM

## 2024-08-09 DIAGNOSIS — N18.31 TYPE 2 DIABETES MELLITUS WITH STAGE 3A CHRONIC KIDNEY DISEASE, WITHOUT LONG-TERM CURRENT USE OF INSULIN: Primary | ICD-10-CM

## 2024-08-09 DIAGNOSIS — I10 PRIMARY HYPERTENSION: ICD-10-CM

## 2024-08-09 DIAGNOSIS — R42 DIZZINESS: ICD-10-CM

## 2024-08-09 DIAGNOSIS — E11.22 TYPE 2 DIABETES MELLITUS WITH STAGE 3A CHRONIC KIDNEY DISEASE, WITHOUT LONG-TERM CURRENT USE OF INSULIN: Primary | ICD-10-CM

## 2024-08-09 DIAGNOSIS — F41.9 ANXIETY: ICD-10-CM

## 2024-08-09 DIAGNOSIS — E78.2 MIXED HYPERLIPIDEMIA: ICD-10-CM

## 2024-08-09 DIAGNOSIS — H00.021 HORDEOLUM INTERNUM OF RIGHT UPPER EYELID: ICD-10-CM

## 2024-08-09 LAB
25(OH)D3 SERPL-MCNC: 71.6 NG/ML (ref 30–100)
ALBUMIN SERPL-MCNC: 3.9 G/DL (ref 3.5–5.2)
ALBUMIN/GLOB SERPL: 1.4 G/DL
ALP SERPL-CCNC: 120 U/L (ref 39–117)
ALT SERPL W P-5'-P-CCNC: 11 U/L (ref 1–33)
ANION GAP SERPL CALCULATED.3IONS-SCNC: 9.8 MMOL/L (ref 5–15)
AST SERPL-CCNC: 14 U/L (ref 1–32)
BILIRUB SERPL-MCNC: 0.3 MG/DL (ref 0–1.2)
BUN SERPL-MCNC: 10 MG/DL (ref 6–20)
BUN/CREAT SERPL: 8.5 (ref 7–25)
CALCIUM SPEC-SCNC: 9.8 MG/DL (ref 8.6–10.5)
CHLORIDE SERPL-SCNC: 107 MMOL/L (ref 98–107)
CHOLEST SERPL-MCNC: 195 MG/DL (ref 0–200)
CO2 SERPL-SCNC: 26.2 MMOL/L (ref 22–29)
CREAT SERPL-MCNC: 1.18 MG/DL (ref 0.57–1)
EGFRCR SERPLBLD CKD-EPI 2021: 53.6 ML/MIN/1.73
GLOBULIN UR ELPH-MCNC: 2.8 GM/DL
GLUCOSE SERPL-MCNC: 72 MG/DL (ref 65–99)
HBA1C MFR BLD: 5.1 % (ref 4.8–5.6)
HDLC SERPL-MCNC: 26 MG/DL (ref 40–60)
LDLC SERPL CALC-MCNC: 117 MG/DL (ref 0–100)
LDLC/HDLC SERPL: 4.22 {RATIO}
POTASSIUM SERPL-SCNC: 4.1 MMOL/L (ref 3.5–5.2)
PROT SERPL-MCNC: 6.7 G/DL (ref 6–8.5)
SODIUM SERPL-SCNC: 143 MMOL/L (ref 136–145)
TRIGL SERPL-MCNC: 297 MG/DL (ref 0–150)
VLDLC SERPL-MCNC: 52 MG/DL (ref 5–40)

## 2024-08-09 PROCEDURE — 99215 OFFICE O/P EST HI 40 MIN: CPT | Performed by: NURSE PRACTITIONER

## 2024-08-09 PROCEDURE — 80053 COMPREHEN METABOLIC PANEL: CPT | Performed by: NURSE PRACTITIONER

## 2024-08-09 PROCEDURE — 80061 LIPID PANEL: CPT | Performed by: NURSE PRACTITIONER

## 2024-08-09 PROCEDURE — 83036 HEMOGLOBIN GLYCOSYLATED A1C: CPT | Performed by: NURSE PRACTITIONER

## 2024-08-09 PROCEDURE — 82306 VITAMIN D 25 HYDROXY: CPT | Performed by: NURSE PRACTITIONER

## 2024-08-09 RX ORDER — ERYTHROMYCIN 5 MG/G
OINTMENT OPHTHALMIC NIGHTLY
Qty: 1 G | Refills: 0 | Status: SHIPPED | OUTPATIENT
Start: 2024-08-09

## 2024-08-09 RX ORDER — TIRZEPATIDE 5 MG/.5ML
5 INJECTION, SOLUTION SUBCUTANEOUS WEEKLY
Qty: 2 ML | Refills: 2 | Status: SHIPPED | OUTPATIENT
Start: 2024-08-09

## 2024-08-09 NOTE — ASSESSMENT & PLAN NOTE
I will check her vitamin D level with her labs today.  She will continue vitamin D 5000 units daily.

## 2024-08-09 NOTE — ASSESSMENT & PLAN NOTE
Hypertension is borderline due to being off the lisinopril  Ambulatory blood pressure monitoring.  Patient instructed to restart lisinopril and to continue metoprolol XL.  Blood pressure will be reassessedin 3 months.

## 2024-08-09 NOTE — ASSESSMENT & PLAN NOTE
Diabetes is improving with treatment.   Continue current treatment regimen.  Recommended an ADA diet.  Ophthalmology/Optometry referral.  Diabetes will be reassessed in 3 months

## 2024-08-09 NOTE — PROGRESS NOTES
Chief Complaint  Follow-up, Conjunctivitis, Med Refill, Diabetes, and Hypertension    Subjective            Dang Hunt is a 59 y.o. female who presents to [facility]   History of Present Illness  3-month follow-up.  She is also complaining of possible eye infection.  She states she has some irritation to her right upper eyelid and thought she may have pinkeye but then she saw some little bumps and now thinks she has a stye.  She states she started having symptoms on Monday and then it got worse on Tuesday and Wednesday.  She states it is starting to get better but is still very irritated and painful.    Hypertension: Her blood pressure is above goal today.  She is on metoprolol XL 50 mg daily and I started her on lisinopril but she is not taking it because she thought it was making her dizzy. She is still dizzy when she lies down even though she has not taken the medicine for 2 weeks    Hyperlipidemia: She had stopped taking atorvastatin for some reason and then when she restarted the medicine she realized how bad it made her feel.  She stopped taking the atorvastatin.    Diabetes type 2, non-insulin-dependent: She is doing well on Mounjaro 5 mg. She has lost another 16 lb since last visit.     Vitamin D deficiency: She is on vitamin D 5000 units daily.    Depression/anxiety: She is doing okay on Effexor  mg plus a 75 mg tablet daily.    Tobacco Use: High Risk (8/9/2024)    Patient History     Smoking Tobacco Use: Every Day     Smokeless Tobacco Use: Never     Passive Exposure: Not on file      E-cigarette/Vaping    E-cigarette/Vaping Use Never User      E-cigarette/Vaping Substances    Nicotine No     THC No     CBD No     Flavoring No      E-cigarette/Vaping Devices    Disposable No     Pre-filled or Refillable Cartridge No     Refillable Tank No     Pre-filled Pod No        Alcohol Use: Not on file         Objective   Vital Signs:   Vitals:    08/09/24 1024 08/09/24  "1031   BP: 140/84 142/86  Comment: Riki   BP Location: Left arm Right arm   Patient Position: Sitting Sitting   Pulse: 71    Temp: 96.5 °F (35.8 °C)    TempSrc: Temporal    SpO2: 98%    Weight: 88.5 kg (195 lb)    Height: 165.1 cm (65\")      Body mass index is 32.45 kg/m².    Wt Readings from Last 3 Encounters:   08/09/24 88.5 kg (195 lb)   04/15/24 96.1 kg (211 lb 14.4 oz)   11/16/23 108 kg (239 lb 1.6 oz)     BP Readings from Last 3 Encounters:   08/09/24 142/86   05/02/24 144/100   04/15/24 144/82       Health Maintenance   Topic Date Due    DIABETIC FOOT EXAM  Never done    INFLUENZA VACCINE  08/01/2024    HEMOGLOBIN A1C  10/15/2024    COVID-19 Vaccine (2 - 2023-24 season) 04/15/2025 (Originally 9/1/2023)    Pneumococcal Vaccine 0-64 (1 of 2 - PCV) 04/15/2025 (Originally 6/8/1971)    TDAP/TD VACCINES (2 - Tdap) 04/15/2025 (Originally 5/24/2012)    ZOSTER VACCINE (1 of 2) 04/15/2025 (Originally 6/8/2016)    Hepatitis B (1 of 3 - 19+ 3-dose series) 08/09/2025 (Originally 6/8/1985)    ANNUAL PHYSICAL  09/05/2024    BMI FOLLOWUP  10/06/2024    DIABETIC EYE EXAM  01/15/2025    LIPID PANEL  04/15/2025    URINE MICROALBUMIN  04/15/2025    MAMMOGRAM  09/21/2025    COLORECTAL CANCER SCREENING  10/26/2025    HEPATITIS C SCREENING  Completed    PAP SMEAR  Discontinued    LUNG CANCER SCREENING  Discontinued       /86 (BP Location: Right arm, Patient Position: Sitting) Comment: Riki  Pulse 71   Temp 96.5 °F (35.8 °C) (Temporal)   Ht 165.1 cm (65\")   Wt 88.5 kg (195 lb)   SpO2 98%   BMI 32.45 kg/m²       Current Outpatient Medications:     albuterol sulfate  (90 Base) MCG/ACT inhaler, Inhale 2 puffs Every 4 (Four) Hours As Needed for Wheezing., Disp: , Rfl:     busPIRone (BUSPAR) 15 MG tablet, TAKE ONE TABLET BY MOUTH THREE TIMES DAILY AS NEEDED, Disp: 90 tablet, Rfl: 1    fluticasone-salmeterol (Advair HFA) 230-21 MCG/ACT inhaler, Inhale 2 puffs 2 (Two) Times a Day., Disp: 1 each, Rfl: 11    furosemide " (LASIX) 40 MG tablet, Take 1 tablet by mouth Daily., Disp: 90 tablet, Rfl: 1    gabapentin (NEURONTIN) 100 MG capsule, Every 12 (Twelve) Hours., Disp: , Rfl:     glucose monitor monitoring kit, Check blood sugar twice daily., Disp: 1 each, Rfl: 0    ketotifen (ZADITOR) 0.025 % ophthalmic solution, Administer 1 drop to both eyes 2 (Two) Times a Day., Disp: 10 mL, Rfl: 5    lisinopril (PRINIVIL,ZESTRIL) 10 MG tablet, TAKE ONE TABLET BY MOUTH ONCE DAILY FOR high BLOOD PRESSURE, Disp: 30 tablet, Rfl: 2    metoprolol succinate XL (TOPROL-XL) 50 MG 24 hr tablet, Take 1 tablet by mouth Daily., Disp: 90 tablet, Rfl: 1    montelukast (SINGULAIR) 10 MG tablet, TAKE ONE TABLET BY MOUTH ONCE DAILY IN THE MORNING, Disp: 90 tablet, Rfl: 3    Mounjaro 5 MG/0.5ML solution pen-injector pen, Inject 0.5 mL under the skin into the appropriate area as directed 1 (One) Time Per Week. Indications: Type 2 Diabetes, Disp: 2 mL, Rfl: 2    oxyCODONE-acetaminophen (PERCOCET) 5-325 MG per tablet, Take 1 tablet by mouth Every 8 (Eight) Hours As Needed., Disp: , Rfl:     potassium chloride (KLOR-CON M20) 20 MEQ CR tablet, TAKE ONE TABLET BY MOUTH DAILY, Disp: 30 tablet, Rfl: 0    Symproic 0.2 MG tablet, Take 1 tablet by mouth Daily., Disp: , Rfl:     ubrogepant 100 MG tablet, Take 1 tablet by mouth 1 (One) Time As Needed (migraine)., Disp: 8 tablet, Rfl: 2    venlafaxine XR (EFFEXOR-XR) 150 MG 24 hr capsule, Take 1 capsule by mouth Daily., Disp: 90 capsule, Rfl: 1    venlafaxine XR (EFFEXOR-XR) 75 MG 24 hr capsule, Take 1 capsule by mouth Daily., Disp: 90 capsule, Rfl: 1    Ventolin  (90 Base) MCG/ACT inhaler, INHALE 2 PUFFS BY MOUTH EVERY 4 HOURS AS NEEDED FOR WHEEZING OR SHORTNESS OF BREATH, Disp: 18 g, Rfl: 6    vitamin D3 125 MCG (5000 UT) capsule capsule, Take 1 capsule by mouth Daily., Disp: 90 capsule, Rfl: 1    erythromycin (ROMYCIN) 5 MG/GM ophthalmic ointment, Administer  to the right eye Every Night., Disp: 1 g, Rfl: 0   Past  Medical History:   Diagnosis Date    Anxiety     Arthritis     Asthma     CHF (congestive heart failure)     Chronic diastolic CHF (congestive heart failure) 06/10/2022    Colitis     COPD (chronic obstructive pulmonary disease)     Depression     Dysphagia     Edema     Fatigue     GERD (gastroesophageal reflux disease)     History of acute pancreatitis     History of histoplasmosis     HTN (hypertension)     Hyperlipidemia     Inflammatory bowel disease     Irritable bowel syndrome     Light headed     Low back pain     Migraines     Multiple joint pain     On home oxygen therapy     2 L AT NIGHT    MANI (obstructive sleep apnea)     WEARS CPAP    Pancreatitis     RLS (restless legs syndrome)     Type 2 diabetes mellitus without complication, without long-term current use of insulin 01/06/2023        Physical Exam  Vitals reviewed.   Constitutional:       Appearance: Normal appearance. She is well-developed. She is obese.   Eyes:      General:         Right eye: Hordeolum present.     Neck:      Thyroid: No thyroid mass, thyromegaly or thyroid tenderness.   Cardiovascular:      Rate and Rhythm: Normal rate and regular rhythm.      Heart sounds: No murmur heard.     No friction rub. No gallop.   Pulmonary:      Effort: Pulmonary effort is normal.      Breath sounds: Normal breath sounds. No wheezing or rhonchi.   Lymphadenopathy:      Cervical: No cervical adenopathy.   Skin:     General: Skin is warm and dry.   Neurological:      Mental Status: She is alert and oriented to person, place, and time.      Cranial Nerves: No cranial nerve deficit.   Psychiatric:         Mood and Affect: Mood and affect normal.         Behavior: Behavior normal.         Thought Content: Thought content normal. Thought content does not include homicidal or suicidal ideation.         Judgment: Judgment normal.          Result Review :    The following data was reviewed by: ROSHNI Varma on 08/09/2024:  Common Labs   Common  labs          11/7/2023    08:54 11/16/2023    10:07 4/15/2024    09:14   Common Labs   Glucose 84  81  76    BUN 14  13  9    Creatinine 1.22  1.01  1.15    Sodium 141  141  141    Potassium 3.0  3.6  4.1    Chloride 100  104  105    Calcium 10.0  9.6  9.9    Albumin 4.1   4.1    Total Bilirubin 0.4   0.4    Alkaline Phosphatase 111   132    AST (SGOT) 14   13    ALT (SGPT) 22   11    WBC   7.80    Hemoglobin   14.4    Hematocrit   43.4    Platelets   271    Total Cholesterol 196   237    Triglycerides 305   294    HDL Cholesterol 32   31    LDL Cholesterol  111   151    Hemoglobin A1C 5.80   5.30    Microalbumin, Urine   <1.2    Uric Acid 7.6   6.9      TSH   TSH          4/15/2024    09:14   TSH   TSH 3.390      VITD   Lab Results   Component Value Date    YTNH66XU 84.4 04/15/2024     CT Chest Low Dose Cancer Screening WO    Result Date: 5/7/2024  Impression:  1. Stable 6 mm nodule in the right lower lobe. There are no new suspicious pulmonary nodules or masses to indicate primary lung carcinoma. 2. Atelectasis and chronic scarring as described. 3. Mild emphysema. 4. Additional findings as noted above.   Recommendation: Continue annual screening with LDCT  Lung Rads Assessment: Lung-RADS L2 - Benign appearance or <1% chance of malignancy.    Electronically Signed By-Niall Gill MD On:5/7/2024 3:28 PM      Assessment & Plan  Type 2 diabetes mellitus with stage 3a chronic kidney disease, without long-term current use of insulin  Diabetes is improving with treatment.   Continue current treatment regimen.  Recommended an ADA diet.  Ophthalmology/Optometry referral.  Diabetes will be reassessed in 3 months  Primary hypertension  Hypertension is borderline due to being off the lisinopril  Ambulatory blood pressure monitoring.  Patient instructed to restart lisinopril and to continue metoprolol XL.  Blood pressure will be reassessedin 3 months.  Hordeolum internum of right upper eyelid  I will prescribe her some  erythromycin ointment, advised of side effects.  Vitamin D deficiency  I will check her vitamin D level with her labs today.  She will continue vitamin D 5000 units daily.  Mixed hyperlipidemia   Lipid abnormalities are stable    Plan:  Lipid lowering therapy not prescribed due to previous adverse reaction    Counseled patient on lifestyle modifications to help control hyperlipidemia.   Cholesterol lowering dietary information shared with patient.    Patient Treatment Goals:   LDL goal is less than 70    Followup in 3 months.  Anxiety  Currently stable on Effexor, will continue current dose.  Moderate episode of recurrent major depressive disorder  Patient's depression is a recurrent episode that is moderate without psychosis. Depression is active and stable.    Plan:   Continue current medication therapy     Followup at the next regular appointment.   Dizziness  Will check labs today.  Discussed that dizziness can occur from many different reasons and sometimes high blood pressure can cause dizziness.  She is going to restart her lisinopril.    Orders Placed This Encounter   Procedures    Vitamin D,25-Hydroxy    Comprehensive Metabolic Panel    Lipid Panel    Hemoglobin A1c     New Medications Ordered This Visit   Medications    erythromycin (ROMYCIN) 5 MG/GM ophthalmic ointment     Sig: Administer  to the right eye Every Night.     Dispense:  1 g     Refill:  0    Mounjaro 5 MG/0.5ML solution pen-injector pen     Sig: Inject 0.5 mL under the skin into the appropriate area as directed 1 (One) Time Per Week. Indications: Type 2 Diabetes     Dispense:  2 mL     Refill:  2     Dose decreased          Diagnosis Plan   1. Type 2 diabetes mellitus with stage 3a chronic kidney disease, without long-term current use of insulin  Mounjaro 5 MG/0.5ML solution pen-injector pen    Hemoglobin A1c      2. Primary hypertension  Comprehensive Metabolic Panel    Lipid Panel      3. Hordeolum internum of right upper eyelid   erythromycin (ROMYCIN) 5 MG/GM ophthalmic ointment      4. Vitamin D deficiency  Vitamin D,25-Hydroxy      5. Mixed hyperlipidemia  Comprehensive Metabolic Panel    Lipid Panel      6. Anxiety        7. Moderate episode of recurrent major depressive disorder        8. Dizziness            I spent 40 minutes caring for Dang on this date of service. This time includes time spent by me in the following activities:preparing for the visit, reviewing tests, performing a medically appropriate examination and/or evaluation , counseling and educating the patient/family/caregiver, ordering medications, tests, or procedures, and documenting information in the medical record  FOLLOW UP  Return in about 3 months (around 11/9/2024) for 30 min apt for complex pt and diab foot exam.  Patient was given instructions and counseling regarding her condition or for health maintenance advice. Please see specific information pulled into the AVS if appropriate.       CURRENT & DISCONTINUED MEDICATIONS  Current Outpatient Medications   Medication Instructions    albuterol sulfate  (90 Base) MCG/ACT inhaler 2 puffs, Inhalation, Every 4 Hours PRN    busPIRone (BUSPAR) 15 MG tablet TAKE ONE TABLET BY MOUTH THREE TIMES DAILY AS NEEDED    erythromycin (ROMYCIN) 5 MG/GM ophthalmic ointment Right Eye, Nightly    fluticasone-salmeterol (Advair HFA) 230-21 MCG/ACT inhaler 2 puffs, Inhalation, 2 Times Daily - RT    furosemide (LASIX) 40 mg, Oral, Daily    gabapentin (NEURONTIN) 100 MG capsule Every 12 Hours Scheduled    glucose monitor monitoring kit Check blood sugar twice daily.    ketotifen (ZADITOR) 0.025 % ophthalmic solution 1 drop, Both Eyes, 2 Times Daily    lisinopril (PRINIVIL,ZESTRIL) 10 MG tablet TAKE ONE TABLET BY MOUTH ONCE DAILY FOR high BLOOD PRESSURE    metoprolol succinate XL (TOPROL-XL) 50 mg, Oral, Daily    montelukast (SINGULAIR) 10 mg, Oral, Every Morning    Mounjaro 5 mg, Subcutaneous, Weekly    oxyCODONE-acetaminophen  (PERCOCET) 5-325 MG per tablet 1 tablet, Oral, Every 8 Hours PRN    potassium chloride (KLOR-CON M20) 20 MEQ CR tablet 20 mEq, Oral, Daily    Symproic 0.2 MG tablet 1 tablet, Oral, Daily    ubrogepant (UBRELVY) 100 mg, Oral, Once As Needed    venlafaxine XR (EFFEXOR-XR) 150 mg, Oral, Daily    venlafaxine XR (EFFEXOR-XR) 75 mg, Oral, Daily    Ventolin  (90 Base) MCG/ACT inhaler 2 puffs, Every 4 Hours PRN    vitamin D3 5,000 Units, Oral, Daily       Medications Discontinued During This Encounter   Medication Reason    azithromycin (Zithromax Z-Erik) 250 MG tablet *Therapy completed    Mounjaro 5 MG/0.5ML solution pen-injector pen Reorder    atorvastatin (LIPITOR) 40 MG tablet Side effects        Parts of this note are electronic transcriptions/translations of spoken language to printed text using the Dragon Dictation system.    ROSHNI Varma  08/09/24  16:56 EDT

## 2024-08-09 NOTE — ASSESSMENT & PLAN NOTE
Lipid abnormalities are stable    Plan:  Lipid lowering therapy not prescribed due to previous adverse reaction    Counseled patient on lifestyle modifications to help control hyperlipidemia.   Cholesterol lowering dietary information shared with patient.    Patient Treatment Goals:   LDL goal is less than 70    Followup in 3 months.

## 2024-08-12 RX ORDER — EZETIMIBE 10 MG/1
10 TABLET ORAL DAILY
Qty: 30 TABLET | Refills: 3 | Status: SHIPPED | OUTPATIENT
Start: 2024-08-12

## 2024-09-04 PROCEDURE — 87086 URINE CULTURE/COLONY COUNT: CPT | Performed by: NURSE PRACTITIONER

## 2024-09-10 DIAGNOSIS — E78.2 MIXED HYPERLIPIDEMIA: ICD-10-CM

## 2024-09-10 RX ORDER — ATORVASTATIN CALCIUM 40 MG/1
40 TABLET, FILM COATED ORAL
Qty: 30 TABLET | Refills: 2 | OUTPATIENT
Start: 2024-09-10

## 2024-09-22 ENCOUNTER — TELEMEDICINE (OUTPATIENT)
Dept: FAMILY MEDICINE CLINIC | Facility: TELEHEALTH | Age: 59
End: 2024-09-22
Payer: COMMERCIAL

## 2024-09-22 VITALS — TEMPERATURE: 98.9 F

## 2024-09-22 DIAGNOSIS — H66.92 LEFT OTITIS MEDIA, UNSPECIFIED OTITIS MEDIA TYPE: Primary | ICD-10-CM

## 2024-09-22 DIAGNOSIS — R05.9 COUGH, UNSPECIFIED TYPE: ICD-10-CM

## 2024-09-22 DIAGNOSIS — J03.90 EXUDATIVE TONSILLITIS: ICD-10-CM

## 2024-09-22 RX ORDER — DEXTROMETHORPHAN HYDROBROMIDE AND PROMETHAZINE HYDROCHLORIDE 15; 6.25 MG/5ML; MG/5ML
5 SYRUP ORAL 4 TIMES DAILY PRN
Qty: 120 ML | Refills: 0 | Status: SHIPPED | OUTPATIENT
Start: 2024-09-22 | End: 2024-10-02

## 2024-09-22 RX ORDER — ATORVASTATIN CALCIUM 40 MG/1
40 TABLET, FILM COATED ORAL
COMMUNITY
Start: 2024-08-09

## 2024-09-22 RX ORDER — LISINOPRIL 10 MG/1
TABLET ORAL
COMMUNITY

## 2024-11-07 DIAGNOSIS — E55.9 VITAMIN D DEFICIENCY: ICD-10-CM

## 2024-11-07 DIAGNOSIS — I50.9 CONGESTIVE HEART FAILURE, UNSPECIFIED HF CHRONICITY, UNSPECIFIED HEART FAILURE TYPE: ICD-10-CM

## 2024-11-07 DIAGNOSIS — I10 PRIMARY HYPERTENSION: ICD-10-CM

## 2024-11-07 DIAGNOSIS — F41.9 ANXIETY: ICD-10-CM

## 2024-11-07 DIAGNOSIS — F33.1 MODERATE EPISODE OF RECURRENT MAJOR DEPRESSIVE DISORDER: ICD-10-CM

## 2024-11-07 RX ORDER — METOPROLOL SUCCINATE 50 MG/1
50 TABLET, EXTENDED RELEASE ORAL DAILY
Qty: 90 TABLET | Refills: 1 | Status: SHIPPED | OUTPATIENT
Start: 2024-11-07

## 2024-11-07 RX ORDER — VENLAFAXINE HYDROCHLORIDE 150 MG/1
150 CAPSULE, EXTENDED RELEASE ORAL DAILY
Qty: 90 CAPSULE | Refills: 1 | Status: SHIPPED | OUTPATIENT
Start: 2024-11-07

## 2024-11-07 RX ORDER — FUROSEMIDE 40 MG/1
40 TABLET ORAL DAILY
Qty: 90 TABLET | Refills: 1 | Status: SHIPPED | OUTPATIENT
Start: 2024-11-07

## 2024-12-20 DIAGNOSIS — N18.31 TYPE 2 DIABETES MELLITUS WITH STAGE 3A CHRONIC KIDNEY DISEASE, WITHOUT LONG-TERM CURRENT USE OF INSULIN: ICD-10-CM

## 2024-12-20 DIAGNOSIS — E11.22 TYPE 2 DIABETES MELLITUS WITH STAGE 3A CHRONIC KIDNEY DISEASE, WITHOUT LONG-TERM CURRENT USE OF INSULIN: ICD-10-CM

## 2024-12-20 DIAGNOSIS — E78.2 MIXED HYPERLIPIDEMIA: ICD-10-CM

## 2024-12-20 RX ORDER — EZETIMIBE 10 MG/1
10 TABLET ORAL DAILY
Qty: 30 TABLET | Refills: 3 | Status: SHIPPED | OUTPATIENT
Start: 2024-12-20

## 2024-12-20 RX ORDER — TIRZEPATIDE 5 MG/.5ML
INJECTION, SOLUTION SUBCUTANEOUS
Qty: 2 ML | Refills: 3 | OUTPATIENT
Start: 2024-12-20

## 2024-12-26 DIAGNOSIS — N18.31 TYPE 2 DIABETES MELLITUS WITH STAGE 3A CHRONIC KIDNEY DISEASE, WITHOUT LONG-TERM CURRENT USE OF INSULIN: Primary | ICD-10-CM

## 2024-12-26 DIAGNOSIS — F33.1 MODERATE EPISODE OF RECURRENT MAJOR DEPRESSIVE DISORDER: ICD-10-CM

## 2024-12-26 DIAGNOSIS — E11.22 TYPE 2 DIABETES MELLITUS WITH STAGE 3A CHRONIC KIDNEY DISEASE, WITHOUT LONG-TERM CURRENT USE OF INSULIN: Primary | ICD-10-CM

## 2024-12-26 RX ORDER — VENLAFAXINE HYDROCHLORIDE 75 MG/1
75 CAPSULE, EXTENDED RELEASE ORAL DAILY
Qty: 30 CAPSULE | Refills: 0 | Status: SHIPPED | OUTPATIENT
Start: 2024-12-26

## 2024-12-26 NOTE — TELEPHONE ENCOUNTER
Caller: Dang Hunt    Relationship: Self    Best call back number: [phone]     Requested Prescriptions:   Requested Prescriptions     Pending Prescriptions Disp Refills    venlafaxine XR (EFFEXOR-XR) 75 MG 24 hr capsule 90 capsule 1     Sig: Take 1 capsule by mouth Daily.        Pharm[facility] where request should be sent: [facility] [address] - [phone]  - [phone] FX     Last office visit with prescribing clinician: 8/9/2024   Last telemedicine visit with prescribing clinician: Visit date not found   Next office visit with prescribing clinician: 1/2/2025     Additional details provided by patient: OUT OF MEDICATION    Does the patient have less than a 3 day supply:  [x] Yes  [] No    Would you like a call back once the refill request has been completed: [] Yes [] No    If the office needs to give you a call back, can they leave a voicemail: [] Yes [] No    Harvey Mac Rep   12/26/24 08:49 EST

## 2024-12-26 NOTE — TELEPHONE ENCOUNTER
Pt states they are completely our of Mounjaro and Effexor. Please advise if refill is appropriate

## 2024-12-26 NOTE — TELEPHONE ENCOUNTER
Caller: Dang Hunt    Relationship: Self    Best call back number: [phone]     What medication are you requesting: MOUNJARO    If a prescription is needed, what is your preferred pharmacy and phone number: [facility] - [place], [place] - [address] - [phone]  - [phone] FX     Additional notes:  OUT OF MEDICATION, NEXT DOSE DUE 12.27.2024.      CONTACT PATIENT WHEN COMPLETE.

## 2024-12-26 NOTE — TELEPHONE ENCOUNTER
She is overdue for her 3-month follow-up.  I will send in a 30-day supply for both of them.  She needs to make sure to keep her appointment next week.

## 2025-01-02 ENCOUNTER — OFFICE VISIT (OUTPATIENT)
Dept: FAMILY MEDICINE CLINIC | Facility: CLINIC | Age: 60
End: 2025-01-02
Payer: COMMERCIAL

## 2025-01-02 VITALS
HEIGHT: 65 IN | BODY MASS INDEX: 33.27 KG/M2 | HEART RATE: 72 BPM | OXYGEN SATURATION: 97 % | SYSTOLIC BLOOD PRESSURE: 140 MMHG | WEIGHT: 199.7 LBS | DIASTOLIC BLOOD PRESSURE: 80 MMHG

## 2025-01-02 DIAGNOSIS — E55.9 VITAMIN D DEFICIENCY: ICD-10-CM

## 2025-01-02 DIAGNOSIS — E78.2 MIXED HYPERLIPIDEMIA: ICD-10-CM

## 2025-01-02 DIAGNOSIS — K21.9 GASTROESOPHAGEAL REFLUX DISEASE, UNSPECIFIED WHETHER ESOPHAGITIS PRESENT: ICD-10-CM

## 2025-01-02 DIAGNOSIS — E11.9 TYPE 2 DIABETES MELLITUS WITHOUT COMPLICATION, WITHOUT LONG-TERM CURRENT USE OF INSULIN: ICD-10-CM

## 2025-01-02 DIAGNOSIS — Z23 NEED FOR INFLUENZA VACCINATION: ICD-10-CM

## 2025-01-02 DIAGNOSIS — R11.0 NAUSEA: ICD-10-CM

## 2025-01-02 DIAGNOSIS — I10 PRIMARY HYPERTENSION: Primary | ICD-10-CM

## 2025-01-02 PROCEDURE — 83036 HEMOGLOBIN GLYCOSYLATED A1C: CPT | Performed by: NURSE PRACTITIONER

## 2025-01-02 PROCEDURE — 90656 IIV3 VACC NO PRSV 0.5 ML IM: CPT | Performed by: NURSE PRACTITIONER

## 2025-01-02 PROCEDURE — 80061 LIPID PANEL: CPT | Performed by: NURSE PRACTITIONER

## 2025-01-02 PROCEDURE — 85025 COMPLETE CBC W/AUTO DIFF WBC: CPT | Performed by: NURSE PRACTITIONER

## 2025-01-02 PROCEDURE — 90471 IMMUNIZATION ADMIN: CPT | Performed by: NURSE PRACTITIONER

## 2025-01-02 PROCEDURE — 80053 COMPREHEN METABOLIC PANEL: CPT | Performed by: NURSE PRACTITIONER

## 2025-01-02 PROCEDURE — 82306 VITAMIN D 25 HYDROXY: CPT | Performed by: NURSE PRACTITIONER

## 2025-01-02 PROCEDURE — 99214 OFFICE O/P EST MOD 30 MIN: CPT | Performed by: NURSE PRACTITIONER

## 2025-01-02 RX ORDER — LOSARTAN POTASSIUM 25 MG/1
25 TABLET ORAL DAILY
Qty: 30 TABLET | Refills: 2 | Status: SHIPPED | OUTPATIENT
Start: 2025-01-02

## 2025-01-02 RX ORDER — ONDANSETRON 4 MG/1
4 TABLET, ORALLY DISINTEGRATING ORAL EVERY 8 HOURS PRN
Qty: 30 TABLET | Refills: 2 | Status: SHIPPED | OUTPATIENT
Start: 2025-01-02

## 2025-01-02 RX ORDER — FAMOTIDINE 40 MG/1
40 TABLET, FILM COATED ORAL NIGHTLY
Qty: 30 TABLET | Refills: 2 | Status: SHIPPED | OUTPATIENT
Start: 2025-01-02

## 2025-01-02 NOTE — ASSESSMENT & PLAN NOTE
She is having increase in reflux due to medication, I will start her on Pepcid 40 mg daily.  Orders:    famotidine (Pepcid) 40 MG tablet; Take 1 tablet by mouth Every Night. Indications: Gastroesophageal Reflux Disease

## 2025-01-02 NOTE — ASSESSMENT & PLAN NOTE
She is currently taking vitamin D, will recheck her vitamin D with labs today.  Orders:    Vitamin D,25-Hydroxy

## 2025-01-02 NOTE — ASSESSMENT & PLAN NOTE
Hypertension is borderline  Medication changes per orders.  Ambulatory blood pressure monitoring.  Since she is having reaction to lisinopril, I will change her to losartan 25 mg daily.  Advised her to continue to monitor blood pressure at home and notify me if she has any more dizzy spells or her blood pressure is not controlled.  She will also continue metoprolol XL 50 mg daily.  Blood pressure will be reassessedin 3 months.    Orders:    losartan (COZAAR) 25 MG tablet; Take 1 tablet by mouth Daily. Indications: High Blood Pressure    CBC Auto Differential    Comprehensive Metabolic Panel    Lipid Panel

## 2025-01-02 NOTE — ASSESSMENT & PLAN NOTE
Lipid abnormalities are stable    Plan:  Continue same medication/s without change.      Discussed medication dosage, use, side effects, and goals of treatment in detail.    Counseled patient on lifestyle modifications to help control hyperlipidemia.   We will check lipid panel today.  Patient Treatment Goals:   LDL goal is less than 70    Followup in 3 months.    Orders:    Comprehensive Metabolic Panel    Lipid Panel

## 2025-01-02 NOTE — PROGRESS NOTES
"Chief Complaint  Hypertension (Follow up), Diabetes, Hyperlipidemia, and Heartburn    Subjective            Dang Hunt is a 59 y.o. female who presents to [facility]   History of Present Illness  Follow-up and medication review.    Hypertension: Her blood pressure was not controlled on her last visit because she had been off of her lisinopril.  I did restart her lisinopril and advised her to continue metoprolol XL as prescribed. She stopped lisinopril because it was making her dizzy.  She states her BP has been running high, 110 diastolic.  She had tried lisinopril in the past and it made her dizzy and she tried it again that continues to make her dizzy.    Hyperlipidemia: She had been off of atorvastatin previously and then she realized how bad it made her feel after she restarted it.  I started her on a different cholesterol medication Zetia.  She states she is tolerating Zetia fine.    Diabetes type 2, non-insulin-dependent: She is doing well, now on Mounjaro 7.5 mg weekly.  She states she just started this dose last night and has more nausea today and \"sour burps.\"  Her last A1c was within normal limits.     Vitamin D deficiency: Her last vitamin D level was good so advised her she could decrease vitamin D dose to 3 times per week.    Depression/anxiety: She is doing okay on Effexor  mg plus a 75 mg tablet daily.    PHQ-2 Depression Screening  Little interest or pleasure in doing things? Not at all   Feeling down, depressed, or hopeless? Not at all   PHQ-2 Total Score 0       Tobacco Use: High Risk (1/2/2025)    Patient History     Smoking Tobacco Use: Every Day     Smokeless Tobacco Use: Never     Passive Exposure: Not on file      E-cigarette/Vaping    E-cigarette/Vaping Use Never User      E-cigarette/Vaping Substances    Nicotine No     THC No     CBD No     Flavoring No      E-cigarette/Vaping Devices    Disposable No     Pre-filled or Refillable Cartridge No     " "Refillable Tank No     Pre-filled Pod No        Alcohol Use: Not on file         Objective   Vital Signs:   Vitals:    01/02/25 1536 01/02/25 1537   BP: 148/89 140/80   Pulse: 72    SpO2: 97%    Weight: 90.6 kg (199 lb 11.2 oz)    Height: 165.1 cm (65\")      Body mass index is 33.23 kg/m².    Wt Readings from Last 3 Encounters:   01/02/25 90.6 kg (199 lb 11.2 oz)   09/04/24 88.5 kg (195 lb)   08/09/24 88.5 kg (195 lb)     BP Readings from Last 3 Encounters:   01/02/25 140/80   09/04/24 142/93   08/09/24 142/86       Health Maintenance   Topic Date Due    INFLUENZA VACCINE  07/01/2024    ANNUAL PHYSICAL  09/05/2024    BMI FOLLOWUP  10/06/2024    DIABETIC EYE EXAM  01/15/2025    HEMOGLOBIN A1C  02/09/2025    COVID-19 Vaccine (2 - 2024-25 season) 01/04/2025 (Originally 9/1/2024)    Pneumococcal Vaccine 0-64 (1 of 2 - PCV) 04/15/2025 (Originally 6/8/1971)    TDAP/TD VACCINES (2 - Tdap) 04/15/2025 (Originally 5/24/2012)    ZOSTER VACCINE (1 of 2) 04/15/2025 (Originally 6/8/2016)    Hepatitis B (1 of 3 - 19+ 3-dose series) 08/09/2025 (Originally 6/8/1985)    LUNG CANCER SCREENING  05/07/2025    LIPID PANEL  08/09/2025    MAMMOGRAM  09/21/2025    COLORECTAL CANCER SCREENING  10/26/2025    HEPATITIS C SCREENING  Completed    PAP SMEAR  Discontinued    URINE MICROALBUMIN  Discontinued       /80   Pulse 72   Ht 165.1 cm (65\")   Wt 90.6 kg (199 lb 11.2 oz)   SpO2 97%   BMI 33.23 kg/m²       Current Outpatient Medications:     albuterol sulfate  (90 Base) MCG/ACT inhaler, Inhale 2 puffs Every 4 (Four) Hours As Needed for Wheezing., Disp: , Rfl:     busPIRone (BUSPAR) 15 MG tablet, TAKE ONE TABLET BY MOUTH THREE TIMES DAILY AS NEEDED, Disp: 90 tablet, Rfl: 1    ezetimibe (ZETIA) 10 MG tablet, TAKE ONE TABLET BY MOUTH ONCE DAILY, Disp: 30 tablet, Rfl: 3    fluticasone-salmeterol (Advair HFA) 230-21 MCG/ACT inhaler, Inhale 2 puffs 2 (Two) Times a Day., Disp: 1 each, Rfl: 11    furosemide (LASIX) 40 MG tablet, " TAKE ONE TABLET BY MOUTH DAILY, Disp: 90 tablet, Rfl: 1    gabapentin (NEURONTIN) 100 MG capsule, Every 12 (Twelve) Hours., Disp: , Rfl:     glucose monitor monitoring kit, Check blood sugar twice daily., Disp: 1 each, Rfl: 0    metoprolol succinate XL (TOPROL-XL) 50 MG 24 hr tablet, TAKE ONE TABLET BY MOUTH DAILY, Disp: 90 tablet, Rfl: 1    montelukast (SINGULAIR) 10 MG tablet, TAKE ONE TABLET BY MOUTH ONCE DAILY IN THE MORNING, Disp: 90 tablet, Rfl: 3    oxyCODONE-acetaminophen (PERCOCET) 5-325 MG per tablet, Take 1 tablet by mouth Every 8 (Eight) Hours As Needed., Disp: , Rfl:     potassium chloride (KLOR-CON M20) 20 MEQ CR tablet, TAKE ONE TABLET BY MOUTH DAILY, Disp: 30 tablet, Rfl: 0    Symproic 0.2 MG tablet, Take 1 tablet by mouth Daily., Disp: , Rfl:     Tirzepatide 7.5 MG/0.5ML solution auto-injector, Inject 7.5 mg under the skin into the appropriate area as directed Every 7 (Seven) Days. Indications: Type 2 Diabetes, Disp: 2 mL, Rfl: 0    ubrogepant 100 MG tablet, Take 1 tablet by mouth 1 (One) Time As Needed (migraine)., Disp: 8 tablet, Rfl: 2    venlafaxine XR (EFFEXOR-XR) 150 MG 24 hr capsule, TAKE ONE CAPSULE BY MOUTH DAILY, Disp: 90 capsule, Rfl: 1    venlafaxine XR (EFFEXOR-XR) 75 MG 24 hr capsule, Take 1 capsule by mouth Daily. Indications: Major Depressive Disorder, Disp: 30 capsule, Rfl: 0    Ventolin  (90 Base) MCG/ACT inhaler, INHALE 2 PUFFS BY MOUTH EVERY 4 HOURS AS NEEDED FOR WHEEZING OR SHORTNESS OF BREATH, Disp: 18 g, Rfl: 6    vitamin D3 125 MCG (5000 UT) capsule capsule, TAKE ONE CAPSULE BY MOUTH DAILY, Disp: 90 capsule, Rfl: 1    famotidine (Pepcid) 40 MG tablet, Take 1 tablet by mouth Every Night. Indications: Gastroesophageal Reflux Disease, Disp: 30 tablet, Rfl: 2    losartan (COZAAR) 25 MG tablet, Take 1 tablet by mouth Daily. Indications: High Blood Pressure, Disp: 30 tablet, Rfl: 2    ondansetron ODT (ZOFRAN-ODT) 4 MG disintegrating tablet, Place 1 tablet on the tongue Every  8 (Eight) Hours As Needed for Nausea or Vomiting., Disp: 30 tablet, Rfl: 2   Past Medical History:   Diagnosis Date    Allergic     Anxiety     Arthritis     Asthma     CHF (congestive heart failure)     Chronic diastolic CHF (congestive heart failure) 06/10/2022    Colitis     COPD (chronic obstructive pulmonary disease)     Depression     Dysphagia     Edema     Fatigue     GERD (gastroesophageal reflux disease)     Gout     History of acute pancreatitis     History of histoplasmosis     HTN (hypertension)     Hyperlipidemia     Inflammatory bowel disease     Irritable bowel syndrome     Light headed     Low back pain     Migraines     Multiple joint pain     On home oxygen therapy     2 L AT NIGHT    MANI (obstructive sleep apnea)     WEARS CPAP    Pancreatitis     RLS (restless legs syndrome)     Type 2 diabetes mellitus without complication, without long-term current use of insulin 01/06/2023        Physical Exam  Vitals reviewed.   Constitutional:       Appearance: Normal appearance. She is well-developed. She is obese.   Neck:      Thyroid: No thyroid mass, thyromegaly or thyroid tenderness.   Cardiovascular:      Rate and Rhythm: Normal rate and regular rhythm.      Heart sounds: No murmur heard.     No friction rub. No gallop.   Pulmonary:      Effort: Pulmonary effort is normal.      Breath sounds: Normal breath sounds. No wheezing or rhonchi.   Lymphadenopathy:      Cervical: No cervical adenopathy.   Skin:     General: Skin is warm and dry.   Neurological:      Mental Status: She is alert and oriented to person, place, and time.      Cranial Nerves: No cranial nerve deficit.   Psychiatric:         Mood and Affect: Mood and affect normal.         Behavior: Behavior normal.         Thought Content: Thought content normal. Thought content does not include homicidal or suicidal ideation.         Judgment: Judgment normal.          Result Review :    The following data was reviewed by: Maria De Jesus Narayanan,  APRN on 01/02/2025:  Common Labs   Common labs          4/15/2024    09:14 8/9/2024    11:22   Common Labs   Glucose 76  72    BUN 9  10    Creatinine 1.15  1.18    Sodium 141  143    Potassium 4.1  4.1    Chloride 105  107    Calcium 9.9  9.8    Albumin 4.1  3.9    Total Bilirubin 0.4  0.3    Alkaline Phosphatase 132  120    AST (SGOT) 13  14    ALT (SGPT) 11  11    WBC 7.80     Hemoglobin 14.4     Hematocrit 43.4     Platelets 271     Total Cholesterol 237  195    Triglycerides 294  297    HDL Cholesterol 31  26    LDL Cholesterol  151  117    Hemoglobin A1C 5.30  5.10    Microalbumin, Urine <1.2     Uric Acid 6.9       Assessment & Plan  Primary hypertension  Hypertension is borderline  Medication changes per orders.  Ambulatory blood pressure monitoring.  Since she is having reaction to lisinopril, I will change her to losartan 25 mg daily.  Advised her to continue to monitor blood pressure at home and notify me if she has any more dizzy spells or her blood pressure is not controlled.  She will also continue metoprolol XL 50 mg daily.  Blood pressure will be reassessedin 3 months.    Orders:    losartan (COZAAR) 25 MG tablet; Take 1 tablet by mouth Daily. Indications: High Blood Pressure    CBC Auto Differential    Comprehensive Metabolic Panel    Lipid Panel    Mixed hyperlipidemia   Lipid abnormalities are stable    Plan:  Continue same medication/s without change.      Discussed medication dosage, use, side effects, and goals of treatment in detail.    Counseled patient on lifestyle modifications to help control hyperlipidemia.   We will check lipid panel today.  Patient Treatment Goals:   LDL goal is less than 70    Followup in 3 months.    Orders:    Comprehensive Metabolic Panel    Lipid Panel    Type 2 diabetes mellitus without complication, without long-term current use of insulin  Diabetes is stable.   Continue current treatment regimen.  Diabetes will be reassessed in 3 months    Orders:    Hemoglobin  A1c    Gastroesophageal reflux disease, unspecified whether esophagitis present  She is having increase in reflux due to medication, I will start her on Pepcid 40 mg daily.  Orders:    famotidine (Pepcid) 40 MG tablet; Take 1 tablet by mouth Every Night. Indications: Gastroesophageal Reflux Disease    Vitamin D deficiency  She is currently taking vitamin D, will recheck her vitamin D with labs today.  Orders:    Vitamin D,25-Hydroxy    Nausea  She has occasional nausea due to medications.  Refill for Zofran sent to pharmacy.  Orders:    ondansetron ODT (ZOFRAN-ODT) 4 MG disintegrating tablet; Place 1 tablet on the tongue Every 8 (Eight) Hours As Needed for Nausea or Vomiting.             Diagnosis Plan   1. Primary hypertension  losartan (COZAAR) 25 MG tablet    CBC Auto Differential    Comprehensive Metabolic Panel    Lipid Panel      2. Mixed hyperlipidemia  Comprehensive Metabolic Panel    Lipid Panel      3. Type 2 diabetes mellitus without complication, without long-term current use of insulin  Hemoglobin A1c      4. Gastroesophageal reflux disease, unspecified whether esophagitis present  famotidine (Pepcid) 40 MG tablet      5. Vitamin D deficiency  Vitamin D,25-Hydroxy      6. Nausea  ondansetron ODT (ZOFRAN-ODT) 4 MG disintegrating tablet            FOLLOW UP  Return in about 3 months (around 4/2/2025) for Annual physical, 30 min apt for complex pt.  Patient was given instructions and counseling regarding her condition or for health maintenance advice. Please see specific information pulled into the AVS if appropriate.       CURRENT & DISCONTINUED MEDICATIONS  Current Outpatient Medications   Medication Instructions    albuterol sulfate  (90 Base) MCG/ACT inhaler 2 puffs, Every 4 Hours PRN    busPIRone (BUSPAR) 15 MG tablet TAKE ONE TABLET BY MOUTH THREE TIMES DAILY AS NEEDED    ezetimibe (ZETIA) 10 mg, Oral, Daily    famotidine (PEPCID) 40 mg, Oral, Nightly    fluticasone-salmeterol (Advair HFA)  230-21 MCG/ACT inhaler 2 puffs, Inhalation, 2 Times Daily - RT    furosemide (LASIX) 40 mg, Oral, Daily    gabapentin (NEURONTIN) 100 MG capsule Every 12 Hours Scheduled    glucose monitor monitoring kit Check blood sugar twice daily.    losartan (COZAAR) 25 mg, Oral, Daily    metoprolol succinate XL (TOPROL-XL) 50 mg, Oral, Daily    montelukast (SINGULAIR) 10 mg, Oral, Every Morning    ondansetron ODT (ZOFRAN-ODT) 4 mg, Translingual, Every 8 Hours PRN    oxyCODONE-acetaminophen (PERCOCET) 5-325 MG per tablet 1 tablet, Every 8 Hours PRN    potassium chloride (KLOR-CON M20) 20 MEQ CR tablet 20 mEq, Oral, Daily    Symproic 0.2 MG tablet 1 tablet, Daily    Tirzepatide 7.5 mg, Subcutaneous, Every 7 Days    ubrogepant (UBRELVY) 100 mg, Oral, Once As Needed    venlafaxine XR (EFFEXOR-XR) 150 mg, Oral, Daily    venlafaxine XR (EFFEXOR-XR) 75 mg, Oral, Daily    Ventolin  (90 Base) MCG/ACT inhaler 2 puffs, Every 4 Hours PRN    vitamin D3 5,000 Units, Oral, Daily       Medications Discontinued During This Encounter   Medication Reason    lisinopril (PRINIVIL,ZESTRIL) 10 MG tablet Side effects    atorvastatin (LIPITOR) 40 MG tablet Side effects        Parts of this note are electronic transcriptions/translations of spoken language to printed text using the Dragon Dictation system.    Maria De Jesus Narayanan, ROSHNI  01/02/25  16:31 EST

## 2025-01-03 LAB
25(OH)D3 SERPL-MCNC: 65.3 NG/ML (ref 30–100)
ALBUMIN SERPL-MCNC: 4.1 G/DL (ref 3.5–5.2)
ALBUMIN/GLOB SERPL: 1.2 G/DL
ALP SERPL-CCNC: 127 U/L (ref 39–117)
ALT SERPL W P-5'-P-CCNC: 16 U/L (ref 1–33)
ANION GAP SERPL CALCULATED.3IONS-SCNC: 8.5 MMOL/L (ref 5–15)
AST SERPL-CCNC: 13 U/L (ref 1–32)
BASOPHILS # BLD AUTO: 0.08 10*3/MM3 (ref 0–0.2)
BASOPHILS NFR BLD AUTO: 1.1 % (ref 0–1.5)
BILIRUB SERPL-MCNC: 0.3 MG/DL (ref 0–1.2)
BUN SERPL-MCNC: 9 MG/DL (ref 6–20)
BUN/CREAT SERPL: 9.2 (ref 7–25)
CALCIUM SPEC-SCNC: 10.3 MG/DL (ref 8.6–10.5)
CHLORIDE SERPL-SCNC: 102 MMOL/L (ref 98–107)
CHOLEST SERPL-MCNC: 210 MG/DL (ref 0–200)
CO2 SERPL-SCNC: 29.5 MMOL/L (ref 22–29)
CREAT SERPL-MCNC: 0.98 MG/DL (ref 0.57–1)
DEPRECATED RDW RBC AUTO: 42.7 FL (ref 37–54)
EGFRCR SERPLBLD CKD-EPI 2021: 67 ML/MIN/1.73
EOSINOPHIL # BLD AUTO: 0.18 10*3/MM3 (ref 0–0.4)
EOSINOPHIL NFR BLD AUTO: 2.4 % (ref 0.3–6.2)
ERYTHROCYTE [DISTWIDTH] IN BLOOD BY AUTOMATED COUNT: 13.7 % (ref 12.3–15.4)
GLOBULIN UR ELPH-MCNC: 3.3 GM/DL
GLUCOSE SERPL-MCNC: 77 MG/DL (ref 65–99)
HBA1C MFR BLD: 5.3 % (ref 4.8–5.6)
HCT VFR BLD AUTO: 44.4 % (ref 34–46.6)
HDLC SERPL-MCNC: 31 MG/DL (ref 40–60)
HGB BLD-MCNC: 15.1 G/DL (ref 12–15.9)
IMM GRANULOCYTES # BLD AUTO: 0.06 10*3/MM3 (ref 0–0.05)
IMM GRANULOCYTES NFR BLD AUTO: 0.8 % (ref 0–0.5)
LDLC SERPL CALC-MCNC: 126 MG/DL (ref 0–100)
LDLC/HDLC SERPL: 3.84 {RATIO}
LYMPHOCYTES # BLD AUTO: 3.08 10*3/MM3 (ref 0.7–3.1)
LYMPHOCYTES NFR BLD AUTO: 40.7 % (ref 19.6–45.3)
MCH RBC QN AUTO: 29.2 PG (ref 26.6–33)
MCHC RBC AUTO-ENTMCNC: 34 G/DL (ref 31.5–35.7)
MCV RBC AUTO: 85.7 FL (ref 79–97)
MONOCYTES # BLD AUTO: 0.44 10*3/MM3 (ref 0.1–0.9)
MONOCYTES NFR BLD AUTO: 5.8 % (ref 5–12)
NEUTROPHILS NFR BLD AUTO: 3.72 10*3/MM3 (ref 1.7–7)
NEUTROPHILS NFR BLD AUTO: 49.2 % (ref 42.7–76)
NRBC BLD AUTO-RTO: 0 /100 WBC (ref 0–0.2)
PLATELET # BLD AUTO: 277 10*3/MM3 (ref 140–450)
PMV BLD AUTO: 10.5 FL (ref 6–12)
POTASSIUM SERPL-SCNC: 3.9 MMOL/L (ref 3.5–5.2)
PROT SERPL-MCNC: 7.4 G/DL (ref 6–8.5)
RBC # BLD AUTO: 5.18 10*6/MM3 (ref 3.77–5.28)
SODIUM SERPL-SCNC: 140 MMOL/L (ref 136–145)
TRIGL SERPL-MCNC: 300 MG/DL (ref 0–150)
VLDLC SERPL-MCNC: 53 MG/DL (ref 5–40)
WBC NRBC COR # BLD AUTO: 7.56 10*3/MM3 (ref 3.4–10.8)

## 2025-01-15 ENCOUNTER — TELEPHONE (OUTPATIENT)
Dept: FAMILY MEDICINE CLINIC | Facility: CLINIC | Age: 60
End: 2025-01-15
Payer: COMMERCIAL

## 2025-01-15 NOTE — TELEPHONE ENCOUNTER
Attempted to contact patient to offer an appointment for the [facility], no answer, left voicemail

## 2025-01-20 ENCOUNTER — PATIENT ROUNDING (BHMG ONLY) (OUTPATIENT)
Dept: FAMILY MEDICINE CLINIC | Facility: CLINIC | Age: 60
End: 2025-01-20
Payer: COMMERCIAL

## 2025-01-20 NOTE — PROGRESS NOTES
A My-Chart message has been sent to the patient [place] [facility] [place]

## 2025-01-21 DIAGNOSIS — F33.1 MODERATE EPISODE OF RECURRENT MAJOR DEPRESSIVE DISORDER: ICD-10-CM

## 2025-01-21 RX ORDER — VENLAFAXINE HYDROCHLORIDE 75 MG/1
CAPSULE, EXTENDED RELEASE ORAL
Qty: 30 CAPSULE | Refills: 2 | Status: SHIPPED | OUTPATIENT
Start: 2025-01-21

## 2025-01-27 DIAGNOSIS — N18.31 TYPE 2 DIABETES MELLITUS WITH STAGE 3A CHRONIC KIDNEY DISEASE, WITHOUT LONG-TERM CURRENT USE OF INSULIN: ICD-10-CM

## 2025-01-27 DIAGNOSIS — E11.22 TYPE 2 DIABETES MELLITUS WITH STAGE 3A CHRONIC KIDNEY DISEASE, WITHOUT LONG-TERM CURRENT USE OF INSULIN: ICD-10-CM

## 2025-02-13 DIAGNOSIS — R05.9 COUGH, UNSPECIFIED TYPE: ICD-10-CM

## 2025-02-13 DIAGNOSIS — J34.9 PARANASAL SINUS DISEASE: ICD-10-CM

## 2025-02-13 DIAGNOSIS — R06.02 SHORTNESS OF BREATH: ICD-10-CM

## 2025-02-13 DIAGNOSIS — J30.9 ALLERGIC RHINITIS, UNSPECIFIED SEASONALITY, UNSPECIFIED TRIGGER: ICD-10-CM

## 2025-02-13 DIAGNOSIS — J44.89 ASTHMA-COPD OVERLAP SYNDROME: ICD-10-CM

## 2025-02-13 DIAGNOSIS — Z72.0 TOBACCO ABUSE: ICD-10-CM

## 2025-02-13 DIAGNOSIS — R53.82 CHRONIC FATIGUE: ICD-10-CM

## 2025-02-13 DIAGNOSIS — U09.9 COVID-19 LONG HAULER: ICD-10-CM

## 2025-02-13 RX ORDER — MONTELUKAST SODIUM 10 MG/1
10 TABLET ORAL EVERY MORNING
Qty: 90 TABLET | Refills: 3 | OUTPATIENT
Start: 2025-02-13

## 2025-03-06 ENCOUNTER — OFFICE VISIT (OUTPATIENT)
Dept: FAMILY MEDICINE CLINIC | Facility: CLINIC | Age: 60
End: 2025-03-06
Payer: COMMERCIAL

## 2025-03-06 VITALS
BODY MASS INDEX: 33.23 KG/M2 | TEMPERATURE: 97.8 F | OXYGEN SATURATION: 97 % | WEIGHT: 199.7 LBS | DIASTOLIC BLOOD PRESSURE: 88 MMHG | SYSTOLIC BLOOD PRESSURE: 138 MMHG | HEART RATE: 72 BPM

## 2025-03-06 DIAGNOSIS — M54.2 NECK PAIN: ICD-10-CM

## 2025-03-06 DIAGNOSIS — M79.2 RADICULAR PAIN IN LEFT ARM: ICD-10-CM

## 2025-03-06 DIAGNOSIS — R30.0 DYSURIA: ICD-10-CM

## 2025-03-06 DIAGNOSIS — R39.9 UTI SYMPTOMS: Primary | ICD-10-CM

## 2025-03-06 LAB
BACTERIA UR QL AUTO: ABNORMAL /HPF
BILIRUB BLD-MCNC: ABNORMAL MG/DL
BILIRUB UR QL STRIP: NEGATIVE
CLARITY UR: ABNORMAL
CLARITY, POC: CLEAR
COLOR UR: ABNORMAL
COLOR UR: YELLOW
EXPIRATION DATE: ABNORMAL
GLUCOSE UR STRIP-MCNC: NEGATIVE MG/DL
GLUCOSE UR STRIP-MCNC: NEGATIVE MG/DL
HGB UR QL STRIP.AUTO: ABNORMAL
HYALINE CASTS UR QL AUTO: ABNORMAL /LPF
KETONES UR QL STRIP: ABNORMAL
KETONES UR QL: NEGATIVE
LEUKOCYTE EST, POC: NEGATIVE
LEUKOCYTE ESTERASE UR QL STRIP.AUTO: NEGATIVE
Lab: ABNORMAL
NITRITE UR QL STRIP: NEGATIVE
NITRITE UR-MCNC: NEGATIVE MG/ML
PH UR STRIP.AUTO: 5.5 [PH] (ref 5–8)
PH UR: 5.5 [PH] (ref 5–8)
PROT UR QL STRIP: ABNORMAL
PROT UR STRIP-MCNC: ABNORMAL MG/DL
RBC # UR STRIP: ABNORMAL /HPF
RBC # UR STRIP: ABNORMAL /UL
REF LAB TEST METHOD: ABNORMAL
SP GR UR STRIP: 1.03 (ref 1–1.03)
SP GR UR: 1.03 (ref 1–1.03)
SQUAMOUS #/AREA URNS HPF: ABNORMAL /HPF
UROBILINOGEN UR QL STRIP: ABNORMAL
UROBILINOGEN UR QL: NORMAL
WBC # UR STRIP: ABNORMAL /HPF

## 2025-03-06 PROCEDURE — 87086 URINE CULTURE/COLONY COUNT: CPT | Performed by: NURSE PRACTITIONER

## 2025-03-06 PROCEDURE — 99214 OFFICE O/P EST MOD 30 MIN: CPT | Performed by: NURSE PRACTITIONER

## 2025-03-06 PROCEDURE — 81001 URINALYSIS AUTO W/SCOPE: CPT | Performed by: NURSE PRACTITIONER

## 2025-03-06 PROCEDURE — 81003 URINALYSIS AUTO W/O SCOPE: CPT | Performed by: NURSE PRACTITIONER

## 2025-03-06 RX ORDER — NITROFURANTOIN 25; 75 MG/1; MG/1
100 CAPSULE ORAL 2 TIMES DAILY
Qty: 10 CAPSULE | Refills: 0 | Status: SHIPPED | OUTPATIENT
Start: 2025-03-06 | End: 2025-03-11

## 2025-03-06 NOTE — PROGRESS NOTES
Chief Complaint  Difficulty Urinating    Patient or patient representative verbalized consent for the use of Ambient Listening during the visit with  ROSHNI Varma for chart documentation. 3/6/2025  07:57 EST    Subjective            Dang Hunt is a 59 y.o. female who presents to [facility]   History of Present Illness  History of Present Illness  The patient presents for evaluation of urinary tract infection, neck pain, anxiety and depression.    She reports experiencing dysuria, characterized by a burning sensation and discomfort, which began on the previous Thursday. She has a history of hematuria, which has been previously evaluated. Despite attempts at self-management through increased bathing and soaking, her symptoms persist. She has been using Azo for symptomatic relief but refrained from doing so today to avoid potential interference with urine color. She attempted to alleviate the symptoms with leftover cephalexin (an antibiotic prescribed to her in the past but she used someone else's leftover antibiotic), which provided temporary relief before the symptoms recurred.    She has a history of cervical surgery due to right arm pain, which was attributed to her neck condition. Currently, she is experiencing similar pain radiating down her left arm. She has been under the care of a pain management specialist for an extended period but acknowledges the need for a neurosurgical consultation as her symptoms have worsened. It has been approximately 1.5 to 2 years since her last visit to the neurosurgeon. She identifies prolonged sitting at work as a trigger for her neck pain, which subsequently radiates down her arms. She believes her current arm pain is a result of her neck condition. It has been some time since her last cervical x-ray.    She also suffers from anxiety and depression, which started about 2 years ago. She is seeking [facility] paperwork for work due to  stress and anxiety. She is moving out of her department in a month because it is so stressful. She needs extra day off during the week. She has slept for 2 days now and did not go to work yesterday. She is worried that she might say something that she should not say to them or just leave. She does not want to fight.    MEDICATIONS  Past: cephalexin      Tobacco Use: High Risk (3/6/2025)    Patient History     Smoking Tobacco Use: Every Day     Smokeless Tobacco Use: Never     Passive Exposure: Not on file      E-cigarette/Vaping    E-cigarette/Vaping Use Never User      E-cigarette/Vaping Substances    Nicotine No     THC No     CBD No     Flavoring No      E-cigarette/Vaping Devices    Disposable No     Pre-filled or Refillable Cartridge No     Refillable Tank No     Pre-filled Pod No        Alcohol Use: Not on file         Objective   Vital Signs:   Vitals:    03/06/25 0738 03/06/25 0742   BP: 141/89 138/88   Pulse: 72    Temp: 97.8 °F (36.6 °C)    SpO2: 97%    Weight: 90.6 kg (199 lb 11.2 oz)      Body mass index is 33.23 kg/m².    Wt Readings from Last 3 Encounters:   03/06/25 90.6 kg (199 lb 11.2 oz)   01/02/25 90.6 kg (199 lb 11.2 oz)   09/04/24 88.5 kg (195 lb)     BP Readings from Last 3 Encounters:   03/06/25 138/88   01/02/25 140/80   09/04/24 142/93       Health Maintenance   Topic Date Due    URINE MICROALBUMIN-CREATININE RATIO (uACR)  Never done    ANNUAL PHYSICAL  09/05/2024    BMI FOLLOWUP  10/06/2024    DIABETIC FOOT EXAM  Never done    DIABETIC EYE EXAM  01/15/2025    LUNG CANCER SCREENING  05/07/2025    COVID-19 Vaccine (2 - 2024-25 season) 03/08/2025 (Originally 9/1/2024)    TDAP/TD VACCINES (2 - Tdap) 04/15/2025 (Originally 5/24/2012)    ZOSTER VACCINE (1 of 2) 04/15/2025 (Originally 6/8/2016)    Hepatitis B (1 of 3 - 19+ 3-dose series) 08/09/2025 (Originally 6/8/1985)    Pneumococcal Vaccine 50+ (1 of 2 - PCV) 03/06/2026 (Originally 6/8/1985)    HEMOGLOBIN A1C  07/02/2025    MAMMOGRAM   09/21/2025    COLORECTAL CANCER SCREENING  10/26/2025    LIPID PANEL  01/02/2026    HEPATITIS C SCREENING  Completed    INFLUENZA VACCINE  Completed    PAP SMEAR  Discontinued       /88   Pulse 72   Temp 97.8 °F (36.6 °C)   Wt 90.6 kg (199 lb 11.2 oz)   SpO2 97%   BMI 33.23 kg/m²       Current Outpatient Medications:     albuterol sulfate  (90 Base) MCG/ACT inhaler, Inhale 2 puffs Every 4 (Four) Hours As Needed for Wheezing., Disp: , Rfl:     busPIRone (BUSPAR) 15 MG tablet, TAKE ONE TABLET BY MOUTH THREE TIMES DAILY AS NEEDED, Disp: 90 tablet, Rfl: 1    ezetimibe (ZETIA) 10 MG tablet, TAKE ONE TABLET BY MOUTH ONCE DAILY, Disp: 30 tablet, Rfl: 3    famotidine (Pepcid) 40 MG tablet, Take 1 tablet by mouth Every Night. Indications: Gastroesophageal Reflux Disease, Disp: 30 tablet, Rfl: 2    fluticasone-salmeterol (Advair HFA) 230-21 MCG/ACT inhaler, Inhale 2 puffs 2 (Two) Times a Day., Disp: 1 each, Rfl: 11    furosemide (LASIX) 40 MG tablet, TAKE ONE TABLET BY MOUTH DAILY, Disp: 90 tablet, Rfl: 1    gabapentin (NEURONTIN) 100 MG capsule, Every 12 (Twelve) Hours., Disp: , Rfl:     glucose monitor monitoring kit, Check blood sugar twice daily., Disp: 1 each, Rfl: 0    losartan (COZAAR) 25 MG tablet, Take 1 tablet by mouth Daily. Indications: High Blood Pressure, Disp: 30 tablet, Rfl: 2    metoprolol succinate XL (TOPROL-XL) 50 MG 24 hr tablet, TAKE ONE TABLET BY MOUTH DAILY, Disp: 90 tablet, Rfl: 1    montelukast (SINGULAIR) 10 MG tablet, TAKE ONE TABLET BY MOUTH ONCE DAILY IN THE MORNING, Disp: 90 tablet, Rfl: 3    ondansetron ODT (ZOFRAN-ODT) 4 MG disintegrating tablet, Place 1 tablet on the tongue Every 8 (Eight) Hours As Needed for Nausea or Vomiting., Disp: 30 tablet, Rfl: 2    oxyCODONE-acetaminophen (PERCOCET) 5-325 MG per tablet, Take 1 tablet by mouth Every 8 (Eight) Hours As Needed., Disp: , Rfl:     potassium chloride (KLOR-CON M20) 20 MEQ CR tablet, TAKE ONE TABLET BY MOUTH DAILY, Disp: 30  tablet, Rfl: 0    Symproic 0.2 MG tablet, Take 1 tablet by mouth Daily., Disp: , Rfl:     Tirzepatide 5 MG/0.5ML solution auto-injector, Inject 5 mg under the skin into the appropriate area as directed Every 7 (Seven) Days. Indications: Type 2 Diabetes, Disp: 2 mL, Rfl: 2    ubrogepant 100 MG tablet, Take 1 tablet by mouth 1 (One) Time As Needed (migraine)., Disp: 8 tablet, Rfl: 2    venlafaxine XR (EFFEXOR-XR) 150 MG 24 hr capsule, TAKE ONE CAPSULE BY MOUTH DAILY, Disp: 90 capsule, Rfl: 1    venlafaxine XR (EFFEXOR-XR) 75 MG 24 hr capsule, TAKE ONE CAPSULE BY MOUTH ONCE DAILY (TAKE WITH 150mg), Disp: 30 capsule, Rfl: 2    Ventolin  (90 Base) MCG/ACT inhaler, INHALE 2 PUFFS BY MOUTH EVERY 4 HOURS AS NEEDED FOR WHEEZING OR SHORTNESS OF BREATH, Disp: 18 g, Rfl: 6    vitamin D3 125 MCG (5000 UT) capsule capsule, TAKE ONE CAPSULE BY MOUTH DAILY, Disp: 90 capsule, Rfl: 1    nitrofurantoin, macrocrystal-monohydrate, (Macrobid) 100 MG capsule, Take 1 capsule by mouth 2 (Two) Times a Day for 5 days. Indications: Urinary Tract Infection, Disp: 10 capsule, Rfl: 0   Past Medical History:   Diagnosis Date    Allergic     Anxiety     Arthritis     Asthma     CHF (congestive heart failure)     Chronic diastolic CHF (congestive heart failure) 06/10/2022    Colitis     COPD (chronic obstructive pulmonary disease)     Depression     Dysphagia     Edema     Fatigue     GERD (gastroesophageal reflux disease)     Gout     History of acute pancreatitis     History of histoplasmosis     HTN (hypertension)     Hyperlipidemia     Inflammatory bowel disease     Irritable bowel syndrome     Light headed     Low back pain     Migraines     Multiple joint pain     On home oxygen therapy     2 L AT NIGHT    MANI (obstructive sleep apnea)     WEARS CPAP    Pancreatitis     RLS (restless legs syndrome)     Type 2 diabetes mellitus without complication, without long-term current use of insulin 01/06/2023        Physical Exam  Vitals and  nursing note reviewed.   Constitutional:       Appearance: Normal appearance. She is obese.   Pulmonary:      Effort: Pulmonary effort is normal.   Neurological:      General: No focal deficit present.      Mental Status: She is alert and oriented to person, place, and time.   Psychiatric:         Mood and Affect: Mood normal.         Behavior: Behavior normal.            Physical Exam        Result Review :    The following data was reviewed by: ROSHNI Varma on 03/06/2025:  Lab Results (last 24 hours)       Procedure Component Value Units Date/Time    POCT urinalysis dipstick, automated [[id number]]  (Abnormal) Collected: 03/06/25 0744    Specimen: Urine Updated: 03/06/25 0745     Color Yellow     Clarity, UA Clear     Specific Gravity  1.030     pH, Urine 5.5     Leukocytes Negative     Nitrite, UA Negative     Protein, POC 30 mg/dL mg/dL      Glucose, UA Negative mg/dL      Ketones, UA Negative     Urobilinogen, UA Normal     Bilirubin Small (1+)     Blood, UA Small     Lot Number [id number]     Expiration Date 3/31/25            Results  Laboratory Studies  Blood and protein detected in urine, no leukocytes or bacteria found.    Assessment & Plan  UTI symptoms    Orders:    nitrofurantoin, macrocrystal-monohydrate, (Macrobid) 100 MG capsule; Take 1 capsule by mouth 2 (Two) Times a Day for 5 days. Indications: Urinary Tract Infection    Dysuria    Orders:    POCT urinalysis dipstick, automated    Urinalysis With Microscopic - Urine, Clean Catch    Urine Culture - Urine, Urine, Clean Catch    nitrofurantoin, macrocrystal-monohydrate, (Macrobid) 100 MG capsule; Take 1 capsule by mouth 2 (Two) Times a Day for 5 days. Indications: Urinary Tract Infection    Neck pain    Orders:    XR Spine Cervical Complete 4 or 5 View; Future    Radicular pain in left arm    Orders:    XR Spine Cervical Complete 4 or 5 View; Future       Assessment & Plan  1. Urinary Tract Infection (UTI).  The patient reports burning and  discomfort during urination since last Thursday. She took leftover cephalexin, which provided temporary relief. Urinalysis shows blood and protein but no leukocytes or bacteria. A prescription for Macrobid (nitrofurantoin) has been issued. A urine culture will be sent to the lab for further analysis. She is advised to maintain adequate hydration. If the culture results necessitate a change in antibiotic, adjustments will be made accordingly.    2. Cervicalgia.  The patient has a history of neck surgery and is experiencing pain radiating down her left arm, similar to previous symptoms affecting her right arm. An x-ray of the cervical spine has been ordered. She will provide the name of her previous neurosurgeon via [employer] message for a referral.    3. Anxiety and Depression.  The patient reports ongoing anxiety and depression, exacerbated by work stress. FMLA paperwork will be completed to allow her to take time off as needed, up to 1-2 times per week, for 1-2 days per episode.    Follow-up  The patient will follow up next month.    PROCEDURE  The patient has a history of cervical surgery due to right arm pain.       Diagnosis Plan   1. UTI symptoms  nitrofurantoin, macrocrystal-monohydrate, (Macrobid) 100 MG capsule      2. Dysuria  POCT urinalysis dipstick, automated    Urinalysis With Microscopic - Urine, Clean Catch    Urine Culture - Urine, Urine, Clean Catch    nitrofurantoin, macrocrystal-monohydrate, (Macrobid) 100 MG capsule      3. Neck pain  XR Spine Cervical Complete 4 or 5 View      4. Radicular pain in left arm  XR Spine Cervical Complete 4 or 5 View            FOLLOW UP  Return for Keep Scheduled Follow-up.  Patient was given instructions and counseling regarding her condition or for health maintenance advice. Please see specific information pulled into the AVS if appropriate.       CURRENT & DISCONTINUED MEDICATIONS  Current Outpatient Medications   Medication Instructions    albuterol sulfate HFA  108 (90 Base) MCG/ACT inhaler 2 puffs, Every 4 Hours PRN    busPIRone (BUSPAR) 15 MG tablet TAKE ONE TABLET BY MOUTH THREE TIMES DAILY AS NEEDED    ezetimibe (ZETIA) 10 mg, Oral, Daily    famotidine (PEPCID) 40 mg, Oral, Nightly    fluticasone-salmeterol (Advair HFA) 230-21 MCG/ACT inhaler 2 puffs, Inhalation, 2 Times Daily - RT    furosemide (LASIX) 40 mg, Oral, Daily    gabapentin (NEURONTIN) 100 MG capsule Every 12 Hours Scheduled    glucose monitor monitoring kit Check blood sugar twice daily.    losartan (COZAAR) 25 mg, Oral, Daily    metoprolol succinate XL (TOPROL-XL) 50 mg, Oral, Daily    montelukast (SINGULAIR) 10 mg, Oral, Every Morning    nitrofurantoin (macrocrystal-monohydrate) (MACROBID) 100 mg, Oral, 2 Times Daily    ondansetron ODT (ZOFRAN-ODT) 4 mg, Translingual, Every 8 Hours PRN    oxyCODONE-acetaminophen (PERCOCET) 5-325 MG per tablet 1 tablet, Every 8 Hours PRN    potassium chloride (KLOR-CON M20) 20 MEQ CR tablet 20 mEq, Oral, Daily    Symproic 0.2 MG tablet 1 tablet, Daily    Tirzepatide 5 mg, Subcutaneous, Every 7 Days    ubrogepant (UBRELVY) 100 mg, Oral, Once As Needed    venlafaxine XR (EFFEXOR-XR) 75 MG 24 hr capsule TAKE ONE CAPSULE BY MOUTH ONCE DAILY (TAKE WITH 150mg)    venlafaxine XR (EFFEXOR-XR) 150 mg, Oral, Daily    Ventolin  (90 Base) MCG/ACT inhaler 2 puffs, Every 4 Hours PRN    vitamin D3 5,000 Units, Oral, Daily       There are no discontinued medications.     Parts of this note are electronic transcriptions/translations of spoken language to printed text using the Dragon Dictation system.    Maria De Jesus Narayanan, APRN  03/06/25  08:05 EST

## 2025-03-08 LAB — BACTERIA SPEC AEROBE CULT: NO GROWTH

## 2025-03-17 DIAGNOSIS — K21.9 GASTROESOPHAGEAL REFLUX DISEASE, UNSPECIFIED WHETHER ESOPHAGITIS PRESENT: ICD-10-CM

## 2025-03-17 DIAGNOSIS — I10 PRIMARY HYPERTENSION: ICD-10-CM

## 2025-03-18 RX ORDER — LOSARTAN POTASSIUM 25 MG/1
25 TABLET ORAL DAILY
Qty: 30 TABLET | Refills: 0 | Status: SHIPPED | OUTPATIENT
Start: 2025-03-18

## 2025-03-18 RX ORDER — FAMOTIDINE 40 MG/1
40 TABLET, FILM COATED ORAL
Qty: 30 TABLET | Refills: 0 | Status: SHIPPED | OUTPATIENT
Start: 2025-03-18

## 2025-04-03 ENCOUNTER — OFFICE VISIT (OUTPATIENT)
Dept: FAMILY MEDICINE CLINIC | Facility: CLINIC | Age: 60
End: 2025-04-03
Payer: COMMERCIAL

## 2025-04-03 VITALS
DIASTOLIC BLOOD PRESSURE: 88 MMHG | WEIGHT: 200.6 LBS | HEART RATE: 65 BPM | BODY MASS INDEX: 33.42 KG/M2 | TEMPERATURE: 96.9 F | SYSTOLIC BLOOD PRESSURE: 142 MMHG | OXYGEN SATURATION: 98 % | HEIGHT: 65 IN

## 2025-04-03 DIAGNOSIS — E55.9 VITAMIN D DEFICIENCY: ICD-10-CM

## 2025-04-03 DIAGNOSIS — F33.1 MODERATE EPISODE OF RECURRENT MAJOR DEPRESSIVE DISORDER: ICD-10-CM

## 2025-04-03 DIAGNOSIS — R07.9 CHEST PAIN, UNSPECIFIED TYPE: ICD-10-CM

## 2025-04-03 DIAGNOSIS — F41.9 ANXIETY: ICD-10-CM

## 2025-04-03 DIAGNOSIS — E11.9 TYPE 2 DIABETES MELLITUS WITHOUT COMPLICATION, WITHOUT LONG-TERM CURRENT USE OF INSULIN: ICD-10-CM

## 2025-04-03 DIAGNOSIS — J30.9 ALLERGIC RHINITIS, UNSPECIFIED SEASONALITY, UNSPECIFIED TRIGGER: ICD-10-CM

## 2025-04-03 DIAGNOSIS — I50.9 CONGESTIVE HEART FAILURE, UNSPECIFIED HF CHRONICITY, UNSPECIFIED HEART FAILURE TYPE: ICD-10-CM

## 2025-04-03 DIAGNOSIS — E78.5 HYPERLIPIDEMIA LDL GOAL <70: ICD-10-CM

## 2025-04-03 DIAGNOSIS — Z00.00 ANNUAL PHYSICAL EXAM: Primary | ICD-10-CM

## 2025-04-03 DIAGNOSIS — Z23 NEED FOR VACCINATION: ICD-10-CM

## 2025-04-03 DIAGNOSIS — J44.89 ASTHMA-COPD OVERLAP SYNDROME: ICD-10-CM

## 2025-04-03 DIAGNOSIS — I10 PRIMARY HYPERTENSION: ICD-10-CM

## 2025-04-03 DIAGNOSIS — Z12.31 ENCOUNTER FOR SCREENING MAMMOGRAM FOR MALIGNANT NEOPLASM OF BREAST: ICD-10-CM

## 2025-04-03 LAB
ALBUMIN UR-MCNC: <1.2 MG/DL
CREAT UR-MCNC: 46.7 MG/DL
MICROALBUMIN/CREAT UR: NORMAL MG/G{CREAT}

## 2025-04-03 PROCEDURE — 82570 ASSAY OF URINE CREATININE: CPT | Performed by: NURSE PRACTITIONER

## 2025-04-03 PROCEDURE — 82043 UR ALBUMIN QUANTITATIVE: CPT | Performed by: NURSE PRACTITIONER

## 2025-04-03 RX ORDER — VENLAFAXINE HYDROCHLORIDE 75 MG/1
75 CAPSULE, EXTENDED RELEASE ORAL DAILY
Qty: 90 CAPSULE | Refills: 1 | Status: SHIPPED | OUTPATIENT
Start: 2025-04-03

## 2025-04-03 RX ORDER — MONTELUKAST SODIUM 10 MG/1
10 TABLET ORAL EVERY MORNING
Qty: 90 TABLET | Refills: 3 | Status: SHIPPED | OUTPATIENT
Start: 2025-04-03

## 2025-04-03 RX ORDER — METOPROLOL SUCCINATE 50 MG/1
50 TABLET, EXTENDED RELEASE ORAL DAILY
Qty: 90 TABLET | Refills: 1 | Status: SHIPPED | OUTPATIENT
Start: 2025-04-03

## 2025-04-03 RX ORDER — VENLAFAXINE HYDROCHLORIDE 150 MG/1
150 CAPSULE, EXTENDED RELEASE ORAL DAILY
Qty: 90 CAPSULE | Refills: 1 | Status: SHIPPED | OUTPATIENT
Start: 2025-04-03

## 2025-04-03 RX ORDER — ASPIRIN 81 MG/1
81 TABLET ORAL DAILY
Qty: 90 TABLET | Refills: 1 | Status: SHIPPED | OUTPATIENT
Start: 2025-04-03

## 2025-04-03 RX ORDER — EZETIMIBE 10 MG/1
10 TABLET ORAL DAILY
Qty: 30 TABLET | Refills: 3 | Status: CANCELLED | OUTPATIENT
Start: 2025-04-03

## 2025-04-03 RX ORDER — LOSARTAN POTASSIUM 50 MG/1
50 TABLET ORAL DAILY
Qty: 90 TABLET | Refills: 1 | Status: SHIPPED | OUTPATIENT
Start: 2025-04-03

## 2025-04-03 RX ORDER — FUROSEMIDE 40 MG/1
40 TABLET ORAL DAILY
Qty: 90 TABLET | Refills: 1 | Status: SHIPPED | OUTPATIENT
Start: 2025-04-03

## 2025-04-03 NOTE — PROGRESS NOTES
Chief Complaint  Annual Exam, Hypertension, Hyperlipidemia, Congestive Heart Failure, and Chest Pain    Patient or patient representative verbalized consent for the use of Ambient Listening during the visit with  ROSHNI Varma for chart documentation. 4/3/2025  15:26 EDT    Subjective            Dang Hunt is a 59 y.o. female who presents to [facility]   History of Present Illness  History of Present Illness  The patient presents for an annual physical exam.    She is currently on a regimen of Mounjaro 5 mg for diabetes management. Her weight fluctuates by approximately 5 to 6 pounds at home. She reports that her food intake improves after a week, allowing her to enjoy one meal before resuming her usual diet. She has not had an eye examination within the past year and has been experiencing some visual blurriness. She plans to schedule an eye examination and dental check-up, as she has insurance coverage for both. She is also due for a mammogram, having missed it the previous year.    She is on ezetimibe for cholesterol management but reports no significant improvement in her cholesterol levels.  She has previous side effects to statin medications.    She occasionally monitors her blood pressure at home, which was initially high but subsequently decreased. She has been experiencing chest pain and lightheadedness over the past week, with dizziness upon lying down that resolves spontaneously. She attributes these symptoms to stress. She is not experiencing any chest pain currently but describes a sensation of pain under her left breast upon inhalation, which is not present today. This symptom was noted during a particularly stressful episode involving her son. She is actively managing her stressors. She is currently on metoprolol XL for blood pressure control and heart failure, a medication she started years ago due to a persistently high heart rate. She has previously  consulted a cardiologist for congestive heart failure, as recommended by her pulmonologist, and underwent an ultrasound. She was not prescribed any medication by the cardiologist and does not recall being advised to schedule a follow-up appointment.    She has been off Singulair for asthma and allergies for several months and has been using Dymista as an alternative. She reports that her asthma exacerbates if she discontinues either medication. She ascends and descends three flights of stairs at work four times daily, which she manages well despite experiencing foot pain.    She continues to smoke and has attempted to quit using Chantix, but discontinued due to nausea. She expresses a desire to quit smoking.    She has been on a vitamin D supplement three days a week for the past nine months and does not require a refill at this time.    She has been experiencing recurrent brown spots on her stomach, which occasionally resolve but recur. She also reports the presence of red dots.    She has been experiencing some visual blurriness. She plans to schedule an eye examination and dental check-up, as she has insurance coverage for both.    She is currently on venlafaxine 150 mg plus 75 mg for stress management.    Supplemental Information  She has a history of total hysterectomy with oophorectomy due to endometriosis. She believes she received a tetanus vaccine within the past 10 years.  She thinks she has had a pneumonia vaccine in the past but it is been a while.  She has a history of pneumonia and chronic cough.    SOCIAL HISTORY  The patient admits to smoking. The patient drinks alcohol rarely.    ALLERGIES  The patient has a reaction to STATIN MEDICINES, Lisinopril, metformin, Vraylar, allopurinol    MEDICATIONS  Current: Mounjaro, ezetimibe, venlafaxine, Singulair, metoprolol XL, losartan, Lasix, vitamin D.  Discontinued: Chantix    IMMUNIZATIONS  The patient has had a flu shot on January 2, 2025 and September  "2023. The patient has had a COVID-19 vaccine in the past, but does not want another COVID shot.        Tobacco Use: High Risk (4/3/2025)    Patient History     Smoking Tobacco Use: Every Day     Smokeless Tobacco Use: Never     Passive Exposure: Not on file      E-cigarette/Vaping    E-cigarette/Vaping Use Never User      E-cigarette/Vaping Substances    Nicotine No     THC No     CBD No     Flavoring No      E-cigarette/Vaping Devices    Disposable No     Pre-filled or Refillable Cartridge No     Refillable Tank No     Pre-filled Pod No        Alcohol Use: Not on file         Objective   Vital Signs:   Vitals:    04/03/25 1512 04/03/25 1521   BP: (!) 160/103 142/88   BP Location: Left arm    Patient Position: Sitting    Cuff Size: Adult    Pulse: 65    Temp: 96.9 °F (36.1 °C)    SpO2: 98%    Weight: 91 kg (200 lb 9.6 oz)    Height: 165.1 cm (65\")    PainSc: 0-No pain      Body mass index is 33.38 kg/m².    Wt Readings from Last 3 Encounters:   04/03/25 91 kg (200 lb 9.6 oz)   03/06/25 90.6 kg (199 lb 11.2 oz)   01/02/25 90.6 kg (199 lb 11.2 oz)     BP Readings from Last 3 Encounters:   04/03/25 142/88   03/06/25 138/88   01/02/25 140/80       Health Maintenance   Topic Date Due    URINE MICROALBUMIN-CREATININE RATIO (uACR)  Never done    ANNUAL PHYSICAL  09/05/2024    MAMMOGRAM  09/21/2024    DIABETIC FOOT EXAM  Never done    DIABETIC EYE EXAM  01/15/2025    LUNG CANCER SCREENING  05/07/2025    HEMOGLOBIN A1C  07/02/2025    TDAP/TD VACCINES (2 - Tdap) 04/15/2025 (Originally 5/24/2012)    ZOSTER VACCINE (1 of 2) 04/15/2025 (Originally 6/8/2016)    Hepatitis B (1 of 3 - 19+ 3-dose series) 08/09/2025 (Originally [birth year]1985)    COVID-19 Vaccine (2 - 2024-25 season) 04/03/2026 (Originally 9/1/2024)    INFLUENZA VACCINE  07/01/2025    COLORECTAL CANCER SCREENING  10/26/2025    LIPID PANEL  01/02/2026    HEPATITIS C SCREENING  Completed    Pneumococcal Vaccine 50+  Completed       /88   Pulse 65   Temp 96.9 °F (36.1 " "°C)   Ht 165.1 cm (65\")   Wt 91 kg (200 lb 9.6 oz)   SpO2 98%   BMI 33.38 kg/m²       Current Outpatient Medications:     albuterol sulfate  (90 Base) MCG/ACT inhaler, Inhale 2 puffs Every 4 (Four) Hours As Needed for Wheezing., Disp: , Rfl:     busPIRone (BUSPAR) 15 MG tablet, TAKE ONE TABLET BY MOUTH THREE TIMES DAILY AS NEEDED, Disp: 90 tablet, Rfl: 1    famotidine (PEPCID) 40 MG tablet, TAKE ONE TABLET BY MOUTH EVERY NIGHT, Disp: 30 tablet, Rfl: 0    fluticasone-salmeterol (Advair HFA) 230-21 MCG/ACT inhaler, Inhale 2 puffs 2 (Two) Times a Day., Disp: 1 each, Rfl: 11    furosemide (LASIX) 40 MG tablet, Take 1 tablet by mouth Daily. Indications: Cardiac Failure, Disp: 90 tablet, Rfl: 1    gabapentin (NEURONTIN) 100 MG capsule, Every 12 (Twelve) Hours., Disp: , Rfl:     glucose monitor monitoring kit, Check blood sugar twice daily., Disp: 1 each, Rfl: 0    losartan (COZAAR) 50 MG tablet, Take 1 tablet by mouth Daily. Indications: High Blood Pressure, Disp: 90 tablet, Rfl: 1    metoprolol succinate XL (TOPROL-XL) 50 MG 24 hr tablet, Take 1 tablet by mouth Daily. Indications: Cardiac Failure, High Blood Pressure, Disp: 90 tablet, Rfl: 1    montelukast (SINGULAIR) 10 MG tablet, Take 1 tablet by mouth Every Morning. Indications: Asthma, Perennial Allergic Rhinitis, Disp: 90 tablet, Rfl: 3    ondansetron ODT (ZOFRAN-ODT) 4 MG disintegrating tablet, Place 1 tablet on the tongue Every 8 (Eight) Hours As Needed for Nausea or Vomiting., Disp: 30 tablet, Rfl: 2    oxyCODONE-acetaminophen (PERCOCET) 5-325 MG per tablet, Take 1 tablet by mouth Every 8 (Eight) Hours As Needed., Disp: , Rfl:     potassium chloride (KLOR-CON M20) 20 MEQ CR tablet, TAKE ONE TABLET BY MOUTH DAILY, Disp: 30 tablet, Rfl: 0    Symproic 0.2 MG tablet, Take 1 tablet by mouth Daily., Disp: , Rfl:     Tirzepatide 5 MG/0.5ML solution auto-injector, Inject 5 mg under the skin into the appropriate area as directed Every 7 (Seven) Days. " Indications: Type 2 Diabetes, Disp: 2 mL, Rfl: 2    ubrogepant 100 MG tablet, Take 1 tablet by mouth 1 (One) Time As Needed (migraine)., Disp: 8 tablet, Rfl: 2    venlafaxine XR (EFFEXOR-XR) 150 MG 24 hr capsule, Take 1 capsule by mouth Daily. Indications: Major Depressive Disorder, Disp: 90 capsule, Rfl: 1    venlafaxine XR (EFFEXOR-XR) 75 MG 24 hr capsule, Take 1 capsule by mouth Daily. Indications: Major Depressive Disorder, Disp: 90 capsule, Rfl: 1    Ventolin  (90 Base) MCG/ACT inhaler, INHALE 2 PUFFS BY MOUTH EVERY 4 HOURS AS NEEDED FOR WHEEZING OR SHORTNESS OF BREATH, Disp: 18 g, Rfl: 6    vitamin D3 125 MCG (5000 UT) capsule capsule, Take 1 capsule by mouth Daily. (Patient taking differently: Take 1 capsule by mouth 3 (Three) Times a Week.), Disp: , Rfl:     aspirin 81 MG EC tablet, Take 1 tablet by mouth Daily. Indications: preventative, Disp: 90 tablet, Rfl: 1    Evolocumab (REPATHA) solution prefilled syringe injection, Inject 1 mL under the skin into the appropriate area as directed Every 14 (Fourteen) Days. Indications: High Amount of Fats in the Blood, Disp: 2 each, Rfl: 5   Past Medical History:   Diagnosis Date    Allergic     Anxiety     Arthritis     Asthma     CHF (congestive heart failure)     Chronic diastolic CHF (congestive heart failure) 06/10/2022    Colitis     COPD (chronic obstructive pulmonary disease)     Depression     Dysphagia     Edema     Fatigue     GERD (gastroesophageal reflux disease)     Gout     History of acute pancreatitis     History of histoplasmosis     HTN (hypertension)     Hyperlipidemia     Inflammatory bowel disease     Irritable bowel syndrome     Light headed     Low back pain     Migraines     Multiple joint pain     On home oxygen therapy     2 L AT NIGHT    MANI (obstructive sleep apnea)     WEARS CPAP    Pancreatitis     RLS (restless legs syndrome)     Type 2 diabetes mellitus without complication, without long-term current use of insulin 01/06/2023         Physical Exam  Vitals reviewed.   Constitutional:       Appearance: Normal appearance. She is well-developed.   Neck:      Thyroid: No thyroid mass, thyromegaly or thyroid tenderness.   Cardiovascular:      Rate and Rhythm: Normal rate and regular rhythm.      Heart sounds: No murmur heard.     No friction rub. No gallop.   Pulmonary:      Effort: Pulmonary effort is normal.      Breath sounds: Normal breath sounds. No wheezing or rhonchi.   Lymphadenopathy:      Cervical: No cervical adenopathy.   Skin:     General: Skin is warm and dry.      Comments: Double cherry angiomas noted to abdomen.  Multiple stuck on brown lesions noted to abdomen especially below breast.   Neurological:      Mental Status: She is alert and oriented to person, place, and time.      Cranial Nerves: No cranial nerve deficit.   Psychiatric:         Mood and Affect: Mood and affect normal.         Behavior: Behavior normal.         Thought Content: Thought content normal. Thought content does not include homicidal or suicidal ideation.         Judgment: Judgment normal.            Physical Exam        Result Review :    The following data was reviewed by: ROSHNI Varma on 04/03/2025:           No Images in the past 120 days found.    Results  Laboratory Studies  A1c was 5.3 in January. Cholesterol was high. Vitamin D level was 65.        ECG 12 Lead    Date/Time: 4/3/2025 3:53 PM  Performed by: Maria De Jesus Narayanan APRN    Authorized by: Maria De Jesus Narayanan APRN  Comparison: compared with previous ECG from 6/10/2022  Similar to previous ECG  Rhythm: sinus rhythm  Rate: normal  Other findings: non-specific ST-T wave changes    Clinical impression: non-specific ECG        Assessment & Plan  Annual physical exam    Allergic rhinitis, unspecified seasonality, unspecified trigger    Orders:    montelukast (SINGULAIR) 10 MG tablet; Take 1 tablet by mouth Every Morning. Indications: Asthma, Perennial Allergic  Rhinitis    Asthma-COPD overlap syndrome      Orders:    montelukast (SINGULAIR) 10 MG tablet; Take 1 tablet by mouth Every Morning. Indications: Asthma, Perennial Allergic Rhinitis    Anxiety    Orders:    venlafaxine XR (EFFEXOR-XR) 150 MG 24 hr capsule; Take 1 capsule by mouth Daily. Indications: Major Depressive Disorder    Moderate episode of recurrent major depressive disorder      Orders:    venlafaxine XR (EFFEXOR-XR) 150 MG 24 hr capsule; Take 1 capsule by mouth Daily. Indications: Major Depressive Disorder    venlafaxine XR (EFFEXOR-XR) 75 MG 24 hr capsule; Take 1 capsule by mouth Daily. Indications: Major Depressive Disorder    Congestive heart failure, unspecified HF chronicity, unspecified heart failure type      Orders:    furosemide (LASIX) 40 MG tablet; Take 1 tablet by mouth Daily. Indications: Cardiac Failure    metoprolol succinate XL (TOPROL-XL) 50 MG 24 hr tablet; Take 1 tablet by mouth Daily. Indications: Cardiac Failure, High Blood Pressure    Ambulatory Referral to Cardiology    Primary hypertension      Orders:    losartan (COZAAR) 50 MG tablet; Take 1 tablet by mouth Daily. Indications: High Blood Pressure    metoprolol succinate XL (TOPROL-XL) 50 MG 24 hr tablet; Take 1 tablet by mouth Daily. Indications: Cardiac Failure, High Blood Pressure    aspirin 81 MG EC tablet; Take 1 tablet by mouth Daily. Indications: preventative    Ambulatory Referral to Cardiology    Vitamin D deficiency    Orders:    Vitamin D,25-Hydroxy; Future    Type 2 diabetes mellitus without complication, without long-term current use of insulin      Orders:    Microalbumin / Creatinine Urine Ratio - Urine, Clean Catch    TSH Rfx On Abnormal To Free T4; Future    CBC Auto Differential; Future    Comprehensive Metabolic Panel; Future    Lipid Panel; Future    Hemoglobin A1c; Future    Hyperlipidemia LDL goal <70       Orders:    Evolocumab (REPATHA) solution prefilled syringe injection; Inject 1 mL under the skin into  the appropriate area as directed Every 14 (Fourteen) Days. Indications: High Amount of Fats in the Blood    Comprehensive Metabolic Panel; Future    Lipid Panel; Future    Ambulatory Referral to Cardiology    Encounter for screening mammogram for malignant neoplasm of breast    Orders:    Mammo Screening Digital Tomosynthesis Bilateral With CAD; Future    Need for vaccination    Orders:    Pneumococcal Conjugate Vaccine 20-Valent All    Chest pain, unspecified type    Orders:    ECG 12 Lead    aspirin 81 MG EC tablet; Take 1 tablet by mouth Daily. Indications: preventative    Ambulatory Referral to Cardiology       Assessment & Plan  1. Diabetes Mellitus.  Her A1c was good at 5.3 in January. She is currently on Mounjaro 5 mg for diabetes management. A urine microalbumin test will be conducted today to check for kidney involvement due to diabetes. She is advised to have an eye examination due to her diabetes.    2. Hypercholesterolemia.  Her cholesterol levels remain high, and she cannot tolerate statins. She is currently on ezetimibe (Zetia) but it is not effective. Ezetimibe will be discontinued. Repatha injections every 14 days will be initiated, pending prior authorization. Co-pay cards are available to reduce the cost. Blood work will be rechecked in 3 months.    3. Hypertension.  Her blood pressure is slightly above the goal. The dosage of losartan will be increased from 25 mg to 50 mg. She will continue metoprolol XL and Lasix.    4. Asthma.  She has been out of Singulair for 2 months. A refill for Singulair will be provided.    5. Smoking Cessation.  She has previously tried Chantix but experienced nausea. She is encouraged to quit smoking due to her asthma and COPD.    6. Vitamin D Management.  Her vitamin D levels were good at 65. She will reduce her vitamin D intake to twice a week.    7. Chest Pain.  She reports occasional chest pain and lightheadedness, particularly under stress. An EKG will be performed  today. She will start aspirin 81 mg daily, and the risks of bleeding have been discussed. A referral to cardiology (Dr. Rodriguez) has been made. If she experiences severe chest pain, she should go to the emergency room.    8. Health Maintenance.  She is advised to schedule an appointment with pulmonology for lung cancer screening and CPAP supplies. A mammogram order will be placed as she is overdue. She is advised to get the shingles vaccine from the pharmacy and update her tetanus (Tdap) and pneumonia vaccines.  She is adamant that she has had a Tdap within the past 10 years.  Pneumonia vaccine was given today in the office.  Fasting labs will be ordered to be completed one week prior to her next appointment.    9. Medication Management.  Refills for venlafaxine (Effexor) 150 mg and 75 mg, Singulair, Lasix, losartan, metoprolol XL, and Repatha will be sent to [facility] pharmacy.    Follow-up  The patient will follow up in 3 months or sooner if any new or worsening symptoms occur.    PROCEDURE  The patient has a history of total hysterectomy with oophorectomy due to endometriosis.            Diagnosis Plan   1. Annual physical exam        2. Allergic rhinitis, unspecified seasonality, unspecified trigger  montelukast (SINGULAIR) 10 MG tablet      3. Asthma-COPD overlap syndrome  montelukast (SINGULAIR) 10 MG tablet      4. Anxiety  venlafaxine XR (EFFEXOR-XR) 150 MG 24 hr capsule      5. Moderate episode of recurrent major depressive disorder  venlafaxine XR (EFFEXOR-XR) 150 MG 24 hr capsule    venlafaxine XR (EFFEXOR-XR) 75 MG 24 hr capsule      6. Congestive heart failure, unspecified HF chronicity, unspecified heart failure type  furosemide (LASIX) 40 MG tablet    metoprolol succinate XL (TOPROL-XL) 50 MG 24 hr tablet    Ambulatory Referral to Cardiology      7. Primary hypertension  losartan (COZAAR) 50 MG tablet    metoprolol succinate XL (TOPROL-XL) 50 MG 24 hr tablet    aspirin 81 MG EC tablet    Ambulatory  Referral to Cardiology      8. Vitamin D deficiency  Vitamin D,25-Hydroxy      9. Type 2 diabetes mellitus without complication, without long-term current use of insulin  Microalbumin / Creatinine Urine Ratio - Urine, Clean Catch    TSH Rfx On Abnormal To Free T4    CBC Auto Differential    Comprehensive Metabolic Panel    Lipid Panel    Hemoglobin A1c      10. Hyperlipidemia LDL goal <70  Evolocumab (REPATHA) solution prefilled syringe injection    Comprehensive Metabolic Panel    Lipid Panel    Ambulatory Referral to Cardiology      11. Encounter for screening mammogram for malignant neoplasm of breast  Mammo Screening Digital Tomosynthesis Bilateral With CAD      12. Need for vaccination  Pneumococcal Conjugate Vaccine 20-Valent All      13. Chest pain, unspecified type  ECG 12 Lead    aspirin 81 MG EC tablet    Ambulatory Referral to Cardiology            FOLLOW UP  Return in about 3 months (around 7/3/2025) for 30 min apt for complex pt, Fasting Labs one week prior to next Appointment.  Patient was given instructions and counseling regarding her condition or for health maintenance advice. Please see specific information pulled into the AVS if appropriate.       CURRENT & DISCONTINUED MEDICATIONS  Current Outpatient Medications   Medication Instructions    albuterol sulfate  (90 Base) MCG/ACT inhaler 2 puffs, Every 4 Hours PRN    aspirin 81 mg, Oral, Daily    busPIRone (BUSPAR) 15 MG tablet TAKE ONE TABLET BY MOUTH THREE TIMES DAILY AS NEEDED    Evolocumab (REPATHA) 140 mg, Subcutaneous, Every 14 Days    famotidine (PEPCID) 40 mg, Oral, Every Night at Bedtime    fluticasone-salmeterol (Advair HFA) 230-21 MCG/ACT inhaler 2 puffs, Inhalation, 2 Times Daily - RT    furosemide (LASIX) 40 mg, Oral, Daily    gabapentin (NEURONTIN) 100 MG capsule Every 12 Hours Scheduled    glucose monitor monitoring kit Check blood sugar twice daily.    losartan (COZAAR) 50 mg, Oral, Daily    metoprolol succinate XL (TOPROL-XL)  50 mg, Oral, Daily    montelukast (SINGULAIR) 10 mg, Oral, Every Morning    ondansetron ODT (ZOFRAN-ODT) 4 mg, Translingual, Every 8 Hours PRN    oxyCODONE-acetaminophen (PERCOCET) 5-325 MG per tablet 1 tablet, Every 8 Hours PRN    potassium chloride (KLOR-CON M20) 20 MEQ CR tablet 20 mEq, Oral, Daily    Symproic 0.2 MG tablet 1 tablet, Daily    Tirzepatide 5 mg, Subcutaneous, Every 7 Days    ubrogepant (UBRELVY) 100 mg, Oral, Once As Needed    venlafaxine XR (EFFEXOR-XR) 150 mg, Oral, Daily    venlafaxine XR (EFFEXOR-XR) 75 mg, Oral, Daily    Ventolin  (90 Base) MCG/ACT inhaler 2 puffs, Every 4 Hours PRN    vitamin D3 5,000 Units, Daily       Medications Discontinued During This Encounter   Medication Reason    ezetimibe (ZETIA) 10 MG tablet Not Efficacious    vitamin D3 125 MCG (5000 UT) capsule capsule *Therapy completed    montelukast (SINGULAIR) 10 MG tablet Reorder    venlafaxine XR (EFFEXOR-XR) 150 MG 24 hr capsule Reorder    furosemide (LASIX) 40 MG tablet Reorder    metoprolol succinate XL (TOPROL-XL) 50 MG 24 hr tablet Reorder    venlafaxine XR (EFFEXOR-XR) 75 MG 24 hr capsule Reorder    losartan (COZAAR) 25 MG tablet Reorder        Parts of this note are electronic transcriptions/translations of spoken language to printed text using the Dragon Dictation system.    Maria De Jesus Narayanan, ROSHNI  04/03/25  16:31 EDT

## 2025-04-03 NOTE — ASSESSMENT & PLAN NOTE
Orders:    Microalbumin / Creatinine Urine Ratio - Urine, Clean Catch    TSH Rfx On Abnormal To Free T4; Future    CBC Auto Differential; Future    Comprehensive Metabolic Panel; Future    Lipid Panel; Future    Hemoglobin A1c; Future

## 2025-04-03 NOTE — ASSESSMENT & PLAN NOTE
Orders:    furosemide (LASIX) 40 MG tablet; Take 1 tablet by mouth Daily. Indications: Cardiac Failure    metoprolol succinate XL (TOPROL-XL) 50 MG 24 hr tablet; Take 1 tablet by mouth Daily. Indications: Cardiac Failure, High Blood Pressure    Ambulatory Referral to Cardiology

## 2025-04-03 NOTE — ASSESSMENT & PLAN NOTE
Orders:    losartan (COZAAR) 50 MG tablet; Take 1 tablet by mouth Daily. Indications: High Blood Pressure    metoprolol succinate XL (TOPROL-XL) 50 MG 24 hr tablet; Take 1 tablet by mouth Daily. Indications: Cardiac Failure, High Blood Pressure    aspirin 81 MG EC tablet; Take 1 tablet by mouth Daily. Indications: preventative    Ambulatory Referral to Cardiology

## 2025-04-03 NOTE — ASSESSMENT & PLAN NOTE
Orders:    montelukast (SINGULAIR) 10 MG tablet; Take 1 tablet by mouth Every Morning. Indications: Asthma, Perennial Allergic Rhinitis

## 2025-04-03 NOTE — ASSESSMENT & PLAN NOTE
Orders:    venlafaxine XR (EFFEXOR-XR) 150 MG 24 hr capsule; Take 1 capsule by mouth Daily. Indications: Major Depressive Disorder    venlafaxine XR (EFFEXOR-XR) 75 MG 24 hr capsule; Take 1 capsule by mouth Daily. Indications: Major Depressive Disorder

## 2025-04-03 NOTE — ASSESSMENT & PLAN NOTE
Orders:    venlafaxine XR (EFFEXOR-XR) 150 MG 24 hr capsule; Take 1 capsule by mouth Daily. Indications: Major Depressive Disorder

## 2025-04-03 NOTE — ASSESSMENT & PLAN NOTE
Orders:    Evolocumab (REPATHA) solution prefilled syringe injection; Inject 1 mL under the skin into the appropriate area as directed Every 14 (Fourteen) Days. Indications: High Amount of Fats in the Blood    Comprehensive Metabolic Panel; Future    Lipid Panel; Future    Ambulatory Referral to Cardiology

## 2025-04-08 ENCOUNTER — TELEPHONE (OUTPATIENT)
Dept: FAMILY MEDICINE CLINIC | Facility: CLINIC | Age: 60
End: 2025-04-08
Payer: COMMERCIAL

## 2025-04-08 DIAGNOSIS — E78.5 HYPERLIPIDEMIA LDL GOAL <70: ICD-10-CM

## 2025-04-08 NOTE — TELEPHONE ENCOUNTER
Repatha  is covered by insurance  if rx is sent for 90 days. To local pharmacy. Per [facility] Prior Authorization

## 2025-04-21 DIAGNOSIS — E11.22 TYPE 2 DIABETES MELLITUS WITH STAGE 3A CHRONIC KIDNEY DISEASE, WITHOUT LONG-TERM CURRENT USE OF INSULIN: ICD-10-CM

## 2025-04-21 DIAGNOSIS — K21.9 GASTROESOPHAGEAL REFLUX DISEASE, UNSPECIFIED WHETHER ESOPHAGITIS PRESENT: ICD-10-CM

## 2025-04-21 DIAGNOSIS — N18.31 TYPE 2 DIABETES MELLITUS WITH STAGE 3A CHRONIC KIDNEY DISEASE, WITHOUT LONG-TERM CURRENT USE OF INSULIN: ICD-10-CM

## 2025-04-21 DIAGNOSIS — E78.2 MIXED HYPERLIPIDEMIA: ICD-10-CM

## 2025-04-21 RX ORDER — TIRZEPATIDE 5 MG/.5ML
INJECTION, SOLUTION SUBCUTANEOUS
Qty: 2 ML | Refills: 3 | Status: SHIPPED | OUTPATIENT
Start: 2025-04-21

## 2025-04-21 RX ORDER — EZETIMIBE 10 MG/1
10 TABLET ORAL DAILY
Qty: 30 TABLET | Refills: 3 | OUTPATIENT
Start: 2025-04-21

## 2025-04-21 RX ORDER — FAMOTIDINE 40 MG/1
40 TABLET, FILM COATED ORAL
Qty: 30 TABLET | Refills: 3 | Status: SHIPPED | OUTPATIENT
Start: 2025-04-21

## 2025-04-28 DIAGNOSIS — I10 PRIMARY HYPERTENSION: ICD-10-CM

## 2025-04-28 RX ORDER — LOSARTAN POTASSIUM 50 MG/1
50 TABLET ORAL 2 TIMES DAILY
Qty: 180 TABLET | Refills: 0 | Status: SHIPPED | OUTPATIENT
Start: 2025-04-28

## 2025-05-01 ENCOUNTER — OFFICE VISIT (OUTPATIENT)
Dept: CARDIOLOGY | Facility: CLINIC | Age: 60
End: 2025-05-01
Payer: COMMERCIAL

## 2025-05-01 VITALS
WEIGHT: 203 LBS | BODY MASS INDEX: 33.82 KG/M2 | DIASTOLIC BLOOD PRESSURE: 96 MMHG | HEIGHT: 65 IN | HEART RATE: 72 BPM | SYSTOLIC BLOOD PRESSURE: 142 MMHG

## 2025-05-01 DIAGNOSIS — I10 PRIMARY HYPERTENSION: Primary | ICD-10-CM

## 2025-05-01 PROCEDURE — 99214 OFFICE O/P EST MOD 30 MIN: CPT | Performed by: NURSE PRACTITIONER

## 2025-05-01 RX ORDER — CLONIDINE HYDROCHLORIDE 0.1 MG/1
0.1 TABLET ORAL DAILY PRN
Qty: 30 TABLET | Refills: 0 | Status: SHIPPED | OUTPATIENT
Start: 2025-05-01

## 2025-05-01 RX ORDER — HYDRALAZINE HYDROCHLORIDE 25 MG/1
25 TABLET, FILM COATED ORAL 2 TIMES DAILY
Qty: 60 TABLET | Refills: 3 | Status: SHIPPED | OUTPATIENT
Start: 2025-05-01

## 2025-05-01 NOTE — PROGRESS NOTES
Chief Complaint  Follow-up, Hyperlipidemia, Hypertension, and Congestive Heart Failure    Subjective            History of Present Illness  Dang Hunt is a 59-year-old female patient who presents to the office today for follow up.    History of Present Illness  The patient presents for evaluation of elevated blood pressure.    She has been experiencing elevated blood pressure readings, with a recent episode of 260/140 last weekend. Despite an increase in her losartan dosage, her blood pressure remains high, although not as severe as the aforementioned episode. Her home blood pressure log indicates a range of 140/90 to 171/100. She has also reported episodes of dizziness and unsteadiness while on lisinopril. She has been on losartan for the past few months, in addition to metoprolol. She has lost approximately 70 pounds. Her heart rate has been consistently in the 70s and 80s, with one instance of it dropping to the 60s.     She has expressed interest in smoking cessation and has attempted to use Chantix but discontinued due to severe nausea. She has also tried nicotine mints but found them unpalatable. She has been attempting to reduce her smoking habit.    SOCIAL HISTORY  The patient is trying to quit smoking.    ALLERGIES  The patient has had an allergic reaction to LISINOPRIL.    MEDICATIONS  Current: Lasix, losartan, metoprolol, baby aspirin  Past: lisinopril, Chantix, amlodipine, valsartan        PMH  Past Medical History:   Diagnosis Date    Allergic     Anxiety     Arthritis     Asthma     CHF (congestive heart failure)     Chronic diastolic CHF (congestive heart failure) 06/10/2022    Colitis     COPD (chronic obstructive pulmonary disease)     Depression     Dysphagia     Edema     Fatigue     GERD (gastroesophageal reflux disease)     Gout     History of acute pancreatitis     History of histoplasmosis     HTN (hypertension)     Hyperlipidemia     Inflammatory bowel disease     Irritable bowel syndrome      Light headed     Low back pain     Migraines     Multiple joint pain     On home oxygen therapy     2 L AT NIGHT    MANI (obstructive sleep apnea)     WEARS CPAP    Pancreatitis     RLS (restless legs syndrome)     Type 2 diabetes mellitus without complication, without long-term current use of insulin 01/06/2023         ALLERGY  Allergies   Allergen Reactions    Allopurinol Other (See Comments)     Ineffective and higher dose caused tingling in feet and hands    Lipitor [Atorvastatin] Myalgia    Lisinopril Dizziness    Metformin GI Intolerance     Diarrhea, nausea    Vraylar [Cariprazine] Other (See Comments)     Did not tolerate          SURGICALHX  Past Surgical History:   Procedure Laterality Date    BACK SURGERY      BARIATRIC SURGERY      BLADDER SUSPENSION      BREAST BIOPSY Left     CARDIAC CATHETERIZATION      COLONOSCOPY      GASTRIC SLEEVE LAPAROSCOPIC N/A 05/15/2017    Procedure: GASTRIC SLEEVE LAPAROSCOPIC;  Surgeon: Edenilson Hannon Jr., MD;  [place] [facility] [place] [facility] [place]  Service:     HYSTERECTOMY      LAPAROSCOPIC CHOLECYSTECTOMY      LUNG BIOPSY      NECK SURGERY      OOPHORECTOMY            SOC  Social History     Socioeconomic History    Marital status:     Number of children: 2   Tobacco Use    Smoking status: Every Day     Current packs/day: 0.50     Average packs/day: 0.5 packs/day for 40.3 years (20.2 ttl pk-yrs)     Types: Cigarettes     Start date: 1/1/1985    Smokeless tobacco: Never   Vaping Use    Vaping status: Never Used   Substance and Sexual Activity    Alcohol use: No    Drug use: No    Sexual activity: Yes     Partners: Male     Birth control/protection: Surgical         FAMHX  Family History   Problem Relation Age of Onset    Obesity Mother     Hypertension Mother     COPD Mother     Arthritis Mother     Obesity Sister     Hypertension Sister     Obesity Maternal Grandmother     Hypertension Maternal Grandmother     Stroke Maternal Grandmother     Heart attack Maternal  Grandmother     Arrhythmia Father     Hypertension Father           MEDSIGONLY  Current Outpatient Medications on File Prior to Visit   Medication Sig    albuterol sulfate  (90 Base) MCG/ACT inhaler Inhale 2 puffs Every 4 (Four) Hours As Needed for Wheezing.    aspirin 81 MG EC tablet Take 1 tablet by mouth Daily. Indications: preventative    busPIRone (BUSPAR) 15 MG tablet TAKE ONE TABLET BY MOUTH THREE TIMES DAILY AS NEEDED    famotidine (PEPCID) 40 MG tablet TAKE ONE TABLET BY MOUTH EVERY NIGHT (Patient taking differently: Take 1 tablet by mouth At Night As Needed.)    fluticasone-salmeterol (Advair HFA) 230-21 MCG/ACT inhaler Inhale 2 puffs 2 (Two) Times a Day.    furosemide (LASIX) 40 MG tablet Take 1 tablet by mouth Daily. Indications: Cardiac Failure (Patient taking differently: Take 0.5 tablets by mouth Daily. Indications: Cardiac Failure)    gabapentin (NEURONTIN) 100 MG capsule Every 12 (Twelve) Hours. (Patient taking differently: Take 1 capsule by mouth As Needed.)    glucose monitor monitoring kit Check blood sugar twice daily.    losartan (COZAAR) 50 MG tablet Take 1 tablet by mouth 2 (Two) Times a Day. Indications: High Blood Pressure    metoprolol succinate XL (TOPROL-XL) 50 MG 24 hr tablet Take 1 tablet by mouth Daily. Indications: Cardiac Failure, High Blood Pressure    montelukast (SINGULAIR) 10 MG tablet Take 1 tablet by mouth Every Morning. Indications: Asthma, Perennial Allergic Rhinitis    Mounjaro 5 MG/0.5ML solution auto-injector INJECT 5mg SUBCUTANEOUSLY every week    ondansetron ODT (ZOFRAN-ODT) 4 MG disintegrating tablet Place 1 tablet on the tongue Every 8 (Eight) Hours As Needed for Nausea or Vomiting.    potassium chloride (KLOR-CON M20) 20 MEQ CR tablet TAKE ONE TABLET BY MOUTH DAILY    ubrogepant 100 MG tablet Take 1 tablet by mouth 1 (One) Time As Needed (migraine).    venlafaxine XR (EFFEXOR-XR) 150 MG 24 hr capsule Take 1 capsule by mouth Daily. Indications: Major Depressive  "Disorder    venlafaxine XR (EFFEXOR-XR) 75 MG 24 hr capsule Take 1 capsule by mouth Daily. Indications: Major Depressive Disorder    Ventolin  (90 Base) MCG/ACT inhaler INHALE 2 PUFFS BY MOUTH EVERY 4 HOURS AS NEEDED FOR WHEEZING OR SHORTNESS OF BREATH    vitamin D3 125 MCG (5000 UT) capsule capsule Take 1 capsule by mouth Daily. (Patient taking differently: Take 1 capsule by mouth 2 (Two) Times a Week.)    [DISCONTINUED] Evolocumab (REPATHA) solution prefilled syringe injection Inject 1 mL under the skin into the appropriate area as directed Every 14 (Fourteen) Days. Indications: High Amount of Fats in the Blood (Patient not taking: Reported on 5/1/2025)    [DISCONTINUED] oxyCODONE-acetaminophen (PERCOCET) 5-325 MG per tablet Take 1 tablet by mouth Every 8 (Eight) Hours As Needed.    [DISCONTINUED] Symproic 0.2 MG tablet Take 1 tablet by mouth Daily.     No current facility-administered medications on file prior to visit.       Objective   /96   Pulse 72   Ht 165.1 cm (65\")   Wt 92.1 kg (203 lb)   BMI 33.78 kg/m²       Physical Exam  Constitutional:       Appearance: She is obese.   HENT:      Head: Normocephalic.   Neck:      Vascular: No carotid bruit.   Cardiovascular:      Rate and Rhythm: Normal rate and regular rhythm.      Pulses: Normal pulses.      Heart sounds: Normal heart sounds. No murmur heard.  Pulmonary:      Effort: Pulmonary effort is normal.      Breath sounds: Normal breath sounds.   Musculoskeletal:      Cervical back: Neck supple.      Right lower leg: No edema.      Left lower leg: No edema.   Skin:     General: Skin is dry.   Neurological:      Mental Status: She is alert and oriented to person, place, and time.   Psychiatric:         Behavior: Behavior normal.       Result Review :   The following data was reviewed by: ROSHNI Sandoval on 05/01/2025:  proBNP   Date Value Ref Range Status   06/10/2022 60.7 0.0 - 900.0 pg/mL Final     CMP          8/9/2024    11:22 " "1/2/2025    16:18   CMP   Glucose 72  77    BUN 10  9    Creatinine 1.18  0.98    EGFR 53.6  67.0    Sodium 143  140    Potassium 4.1  3.9    Chloride 107  102    Calcium 9.8  10.3    Total Protein 6.7  7.4    Albumin 3.9  4.1    Globulin 2.8  3.3    Total Bilirubin 0.3  0.3    Alkaline Phosphatase 120  127    AST (SGOT) 14  13    ALT (SGPT) 11  16    Albumin/Globulin Ratio 1.4  1.2    BUN/Creatinine Ratio 8.5  9.2    Anion Gap 9.8  8.5          1/2/2025    16:18   CBC w/Diff   WBC 7.56    RBC 5.18    Hemoglobin 15.1    Hematocrit 44.4    MCV 85.7    MCH 29.2    MCHC 34.0    RDW 13.7    Platelets 277    Neutrophil Rel % 49.2    Immature Granulocyte Rel % 0.8    Lymphocyte Rel % 40.7    Monocyte Rel % 5.8    Eosinophil Rel % 2.4    Basophil Rel % 1.1       Lab Results   Component Value Date    TSH 3.390 04/15/2024      Lab Results   Component Value Date    FREET4 1.11 07/20/2023      No results found for: \"DDIMERQUANT\"  Magnesium   Date Value Ref Range Status   12/01/2022 2.1 1.6 - 2.6 mg/dL Final      No results found for: \"DIGOXIN\"   No results found for: \"TROPONINT\"        Results for orders placed in visit on 01/19/22    Adult Transthoracic Echo Complete W/ Cont if Necessary Per Protocol    Interpretation Summary  · Calculated left ventricular EF = 62% Estimated left ventricular EF was in agreement with the calculated left ventricular EF.  · Estimated right ventricular systolic pressure from tricuspid regurgitation is normal (<35 mmHg).           Assessment and Plan    Diagnoses and all orders for this visit:    1. Primary hypertension (Primary)    Other orders  -     hydrALAZINE (APRESOLINE) 25 MG tablet; Take 1 tablet by mouth 2 (Two) Times a Day.  Dispense: 60 tablet; Refill: 3  -     cloNIDine (CATAPRES) 0.1 MG tablet; Take 1 tablet by mouth Daily As Needed for High Blood Pressure (BP greater than 160/90).  Dispense: 30 tablet; Refill: 0      Assessment & Plan  1. Hypertension.  Her blood pressure remains " elevated despite an increase in losartan dosage. Home readings range from 140/90 to 171/100. She experienced dizziness with lisinopril and has been on amlodipine and valsartan in the past. She is advised to adhere to a low sodium diet. She will continue losartan 50 mg twice daily and start hydralazine 50 mg twice daily. Clonidine will be prescribed as a rescue medication for blood pressure exceeding 160/90. A 30-day supply of the new medications will be provided, and if they are continued after a 2-week log is submitted, a 90-day supply will be issued.    2. Smoking cessation.  She has been trying to quit smoking but experienced nausea with Chantix. She is encouraged to continue efforts to reduce or quit smoking, as this may naturally lower blood pressure and potentially reduce future medication needs.    3. Shortness of breath.  She underwent an ultrasound of the heart in 2022, ordered by Dr. Rodriguez, due to shortness of breath. The results showed normal heart muscle function, no heart enlargement, and properly functioning valves. Dr. Rodriguez advised her to continue taking her water pill, Lasix 20 mg, which she takes half of most days unless she gains weight.    Follow-up  The patient will follow up in 6 months with Dr. Rodriguez.      Follow Up   Return in about 6 months (around 11/1/2025) for Follow up with Dr Rodriguez.    Patient was given instructions and counseling regarding her condition or for health maintenance advice. Please see specific information pulled into the AVS if appropriate.     Dang Hunt  reports that she has been smoking cigarettes. She started smoking about 40 years ago. She has a 20.2 pack-year smoking history. She has never used smokeless tobacco.          Patient or patient representative verbalized consent for the use of Ambient Listening during the visit with  ROSHNI Sandoval for chart documentation. 5/6/2025  23:13 EDT    ROSHNI Sandoval  05/01/25  13:44 EDT    Dictated Utilizing  [employer]oh[employer] Dictation

## 2025-06-03 ENCOUNTER — TELEPHONE (OUTPATIENT)
Dept: CARDIOLOGY | Facility: CLINIC | Age: 60
End: 2025-06-03
Payer: COMMERCIAL

## 2025-06-03 NOTE — TELEPHONE ENCOUNTER
----- Message from Lynda Engle sent at 6/2/2025 10:23 PM EDT -----  Blood pressures seem to be improving on current medication regimen, find out if she is tolerating new medications without side effect  ----- Message -----  From: Lamar Delgado RegSched Rep  Sent: 5/29/2025   1:24 PM EDT  To: ROSHNI Whitt

## 2025-07-08 ENCOUNTER — LAB (OUTPATIENT)
Dept: FAMILY MEDICINE CLINIC | Facility: CLINIC | Age: 60
End: 2025-07-08
Payer: COMMERCIAL

## 2025-07-08 DIAGNOSIS — E78.5 HYPERLIPIDEMIA LDL GOAL <70: ICD-10-CM

## 2025-07-08 DIAGNOSIS — E11.9 TYPE 2 DIABETES MELLITUS WITHOUT COMPLICATION, WITHOUT LONG-TERM CURRENT USE OF INSULIN: ICD-10-CM

## 2025-07-08 DIAGNOSIS — E55.9 VITAMIN D DEFICIENCY: ICD-10-CM

## 2025-07-08 LAB
25(OH)D3 SERPL-MCNC: 55.5 NG/ML (ref 30–100)
ALBUMIN SERPL-MCNC: 3.8 G/DL (ref 3.5–5.2)
ALBUMIN/GLOB SERPL: 1.3 G/DL
ALP SERPL-CCNC: 126 U/L (ref 39–117)
ALT SERPL W P-5'-P-CCNC: 8 U/L (ref 1–33)
ANION GAP SERPL CALCULATED.3IONS-SCNC: 9 MMOL/L (ref 5–15)
AST SERPL-CCNC: 16 U/L (ref 1–32)
BASOPHILS # BLD AUTO: 0.1 10*3/MM3 (ref 0–0.2)
BASOPHILS NFR BLD AUTO: 1.4 % (ref 0–1.5)
BILIRUB SERPL-MCNC: 0.2 MG/DL (ref 0–1.2)
BUN SERPL-MCNC: 12 MG/DL (ref 6–20)
BUN/CREAT SERPL: 10.7 (ref 7–25)
CALCIUM SPEC-SCNC: 9.3 MG/DL (ref 8.6–10.5)
CHLORIDE SERPL-SCNC: 105 MMOL/L (ref 98–107)
CHOLEST SERPL-MCNC: 200 MG/DL (ref 0–200)
CO2 SERPL-SCNC: 26 MMOL/L (ref 22–29)
CREAT SERPL-MCNC: 1.12 MG/DL (ref 0.57–1)
DEPRECATED RDW RBC AUTO: 42.5 FL (ref 37–54)
EGFRCR SERPLBLD CKD-EPI 2021: 56.8 ML/MIN/1.73
EOSINOPHIL # BLD AUTO: 0.26 10*3/MM3 (ref 0–0.4)
EOSINOPHIL NFR BLD AUTO: 3.6 % (ref 0.3–6.2)
ERYTHROCYTE [DISTWIDTH] IN BLOOD BY AUTOMATED COUNT: 13.7 % (ref 12.3–15.4)
GLOBULIN UR ELPH-MCNC: 3 GM/DL
GLUCOSE SERPL-MCNC: 87 MG/DL (ref 65–99)
HBA1C MFR BLD: 5.2 % (ref 4.8–5.6)
HCT VFR BLD AUTO: 40.5 % (ref 34–46.6)
HDLC SERPL-MCNC: 31 MG/DL (ref 40–60)
HGB BLD-MCNC: 13.8 G/DL (ref 12–15.9)
IMM GRANULOCYTES # BLD AUTO: 0.02 10*3/MM3 (ref 0–0.05)
IMM GRANULOCYTES NFR BLD AUTO: 0.3 % (ref 0–0.5)
LDLC SERPL CALC-MCNC: 119 MG/DL (ref 0–100)
LDLC/HDLC SERPL: 3.62 {RATIO}
LYMPHOCYTES # BLD AUTO: 2.99 10*3/MM3 (ref 0.7–3.1)
LYMPHOCYTES NFR BLD AUTO: 41.4 % (ref 19.6–45.3)
MCH RBC QN AUTO: 29.4 PG (ref 26.6–33)
MCHC RBC AUTO-ENTMCNC: 34.1 G/DL (ref 31.5–35.7)
MCV RBC AUTO: 86.2 FL (ref 79–97)
MONOCYTES # BLD AUTO: 0.44 10*3/MM3 (ref 0.1–0.9)
MONOCYTES NFR BLD AUTO: 6.1 % (ref 5–12)
NEUTROPHILS NFR BLD AUTO: 3.41 10*3/MM3 (ref 1.7–7)
NEUTROPHILS NFR BLD AUTO: 47.2 % (ref 42.7–76)
NRBC BLD AUTO-RTO: 0 /100 WBC (ref 0–0.2)
PLATELET # BLD AUTO: 258 10*3/MM3 (ref 140–450)
PMV BLD AUTO: 10.3 FL (ref 6–12)
POTASSIUM SERPL-SCNC: 4 MMOL/L (ref 3.5–5.2)
PROT SERPL-MCNC: 6.8 G/DL (ref 6–8.5)
RBC # BLD AUTO: 4.7 10*6/MM3 (ref 3.77–5.28)
SODIUM SERPL-SCNC: 140 MMOL/L (ref 136–145)
TRIGL SERPL-MCNC: 284 MG/DL (ref 0–150)
TSH SERPL DL<=0.05 MIU/L-ACNC: 2.72 UIU/ML (ref 0.27–4.2)
VLDLC SERPL-MCNC: 50 MG/DL (ref 5–40)
WBC NRBC COR # BLD AUTO: 7.22 10*3/MM3 (ref 3.4–10.8)

## 2025-07-08 PROCEDURE — 82306 VITAMIN D 25 HYDROXY: CPT | Performed by: NURSE PRACTITIONER

## 2025-07-08 PROCEDURE — 36415 COLL VENOUS BLD VENIPUNCTURE: CPT | Performed by: NURSE PRACTITIONER

## 2025-07-08 PROCEDURE — 83036 HEMOGLOBIN GLYCOSYLATED A1C: CPT | Performed by: NURSE PRACTITIONER

## 2025-07-08 PROCEDURE — 80061 LIPID PANEL: CPT | Performed by: NURSE PRACTITIONER

## 2025-07-08 PROCEDURE — 80050 GENERAL HEALTH PANEL: CPT | Performed by: NURSE PRACTITIONER

## 2025-07-10 DIAGNOSIS — R11.0 NAUSEA: ICD-10-CM

## 2025-07-10 RX ORDER — ONDANSETRON 4 MG/1
4 TABLET, ORALLY DISINTEGRATING ORAL EVERY 8 HOURS PRN
Qty: 30 TABLET | Refills: 2 | Status: SHIPPED | OUTPATIENT
Start: 2025-07-10

## 2025-07-10 NOTE — TELEPHONE ENCOUNTER
Additional details provided by patient: PATIENT WILL BE OUT OF THIS BY HER APPOINTMENT, 07/31/2025

## 2025-07-10 NOTE — TELEPHONE ENCOUNTER
Caller: Dang Hunt    Relationship: Self    Best call back number: [phone]    Requested Prescriptions:   Requested Prescriptions     Pending Prescriptions Disp Refills    ondansetron ODT (ZOFRAN-ODT) 4 MG disintegrating tablet 30 tablet 2     Sig: Place 1 tablet on the tongue Every 8 (Eight) Hours As Needed for Nausea or Vomiting.        Pharmacy where request should be sent: [id number] - [place], [place] - [address] AT [facility] [phone] [facility] [phone] FX     Last office visit with prescribing clinician: 4/3/2025   Last telemedicine visit with prescribing clinician: Visit date not found   Next office visit with prescribing clinician: 7/31/2025     Additional details provided by patient: PATIENT WILL BE OUT OF THIS BY HER APPOINTMENT, 07/31/2025    Does the patient have less than a 3 day supply:  [] Yes  [] No    Would you like a call back once the refill request has been completed: [] Yes [] No    If the office needs to give you a call back, can they leave a voicemail: [] Yes [] No    Harvey Landers   07/10/25 12:12 EDT

## 2025-07-24 ENCOUNTER — TELEPHONE (OUTPATIENT)
Dept: FAMILY MEDICINE CLINIC | Facility: CLINIC | Age: 60
End: 2025-07-24
Payer: COMMERCIAL

## 2025-07-29 NOTE — PROGRESS NOTES
Chief Complaint  Hypertension, Depression, and Back Pain    Patient or patient representative verbalized consent for the use of Ambient Listening during the visit with  ROSHNI Varma for chart documentation. 7/31/2025  11:20 EDT    Subjective            Dang Hunt is a 60 y.o. female who presents to [facility]   Primary Care Follow-Up  Conditions present: other    Treatment compliance:  All of the time  Treatment barriers:  No complaince problems  Exercise:  Rarely    History of Present Illness  The patient presents for a follow-up visit and medication refills.    She is experiencing severe back pain, which has intensified over the past few weeks. The pain worsens with walking and certain movements. She has not sought pain management for over a year and a half. Occasionally, the pain is so severe that she is unable to turn over in bed or walk by the end of the day. She has previously received injections from pain management and is considering returning to them. The pain is located in her lower back and occasionally radiates to her hips. She also experiences pain in her buttock bone when sitting at work, which is more pronounced on the left side. The pain is midline and extends across her back. She describes the pain as nerve-like, causing her to scream and preventing her from standing up. The pain intensifies after prolonged sitting. She has tried marijuana gummies for pain relief but is unsure of their effectiveness. She has some gabapentin left, which she occasionally takes to help her sleep and manage her back pain. She was advised by pain management to take it as needed. She has a history of bulging discs and has undergone back surgery in the past. She believes her current symptoms may be due to bone spurs or arthritis. She also reports a popping sensation in her bones, which she did not experience before, leading her to believe something may be out of  place.    She has been prescribed hydralazine for her blood pressure by her cardiologist, but she discontinued it due to dizziness and balance issues. She has not received any further communication from her cardiologist regarding this. She was taking hydralazine twice daily along with another medication. Her blood pressure was slightly elevated today, but she has not been taking clonidine, which she carries with her as needed. She is currently on losartan 50 mg twice daily and metoprolol 50 mg for blood pressure control. She experienced balance issues with hydralazine, particularly when walking or descending stairs, and also noticed changes in her depth perception. She has a history of congestive heart failure, which was diagnosed during a hospital admission years ago. She has a follow-up appointment with her cardiologist on 11/06/2025. During a recent emergency room visit for high blood pressure, scans revealed small arteries in her heart, which may be contributing to her condition.    She has been experiencing constipation, with bowel movements occurring approximately once a week. She has tried Metamucil but struggles to maintain consistency with it. She has also tried probiotics and magnesium but did not find them helpful. She was previously prescribed a medication for irritable bowel syndrome, which improved her bowel movements.    She has been taking Mounjaro for her diabetes, which appears to be effective. She is currently on a weekly dose of Mounjaro 5 mg and does not wish to increase the dosage at this time. She reports tolerating the medication well. She has been adhering to the weekly schedule and has noticed a decrease in her appetite. However, she has regained approximately 7 or 8 pounds.    She was using Chantix to quit smoking but ran out of her nausea medication. She reports that as long as she takes the nausea medication with Chantix, she tolerates it well. However, she experienced severe gas while  on Chantix, which she identified as a side effect. She has been off Chantix for a while and is considering restarting it.    She has been taking Advair for her asthma and COPD but has not had a refill in a while. She has been using other medications provided by her pulmonologist and needs to schedule an appointment with him.    She has been taking Effexor 75 mg and 150 mg for her mental health.      She has been taking potassium supplements regularly. She believes her potassium levels may have decreased slightly based on her last blood work.    She has been using Ubrelvy for her migraines and has had a few episodes recently. She is unsure if the migraines are triggered by allergies or heat. She requests a refill of Ubrelvy.    She plans to schedule a mammogram in the future. She is due for lung cancer screening and wishes to proceed with the CT scan. She also needs to schedule an eye exam.    She has chronic neck pain, which she attributes to muscle tightness from prolonged computer use. She has seen a specialist for her back and neck issues, who advised her to return if her symptoms worsened. She is not interested in surgical intervention and would prefer injections or other non-surgical treatments.    Social History:  Tobacco: She has been using Chantix to quit smoking.  Recreational Drugs: She has tried marijuana gummies for pain relief.  Sleep: She occasionally takes gabapentin to help her sleep.    PAST SURGICAL HISTORY:  She has undergone back surgery in the past.    Low-Dose CT: Lung Cancer Screening  Criteria:  Age 50-77 years of age (CMS) , 50-80 years of age (Commercial) and;  Patient Age: 60 y.o.  20 pack-year smoking history (# yrs smoked X avg #ppd = pack/yrs); if pt has quit smoking it must be within <15yrs and;  Asymptomatic for lung cancer  ICD-10 Codes:  History of smoking  Tobacco abuse/addiction  Z72.0 (current smoker)  Z87.891 (personal history of smoking/nicotine dependence) use with CMS  "  Patient Smoking History  Social History     Tobacco Use   Smoking Status Every Day    Current packs/day: 0.50    Average packs/day: 0.5 packs/day for 40.6 years (20.3 ttl pk-yrs)    Types: Cigarettes    Start date: 1/1/1985   Smokeless Tobacco Never     CPT Codes:  75157 low dose (must say low dose otherwise is CT without contrast)  / (for patients with [facility] and CMS)        Tobacco Use: High Risk (7/31/2025)    Patient History     Smoking Tobacco Use: Every Day     Smokeless Tobacco Use: Never     Passive Exposure: Not on file      E-cigarette/Vaping    E-cigarette/Vaping Use Never User      E-cigarette/Vaping Substances    Nicotine No     THC No     CBD No     Flavoring No      E-cigarette/Vaping Devices    Disposable No     Pre-filled or Refillable Cartridge No     Refillable Tank No     Pre-filled Pod No        Alcohol Use: Not on file         Objective   Vital Signs:   Vitals:    07/31/25 1101 07/31/25 1110   BP: 142/89 150/84   BP Location: Left arm    Patient Position: Sitting    Cuff Size: Adult    Pulse: 82    Temp: 97.3 °F (36.3 °C)    SpO2: 96%    Weight: 91.8 kg (202 lb 4.8 oz)    Height: 165.1 cm (65\")    PainSc: 8     PainLoc: Back      Body mass index is 33.66 kg/m².    Wt Readings from Last 3 Encounters:   07/31/25 91.8 kg (202 lb 4.8 oz)   05/01/25 92.1 kg (203 lb)   04/03/25 91 kg (200 lb 9.6 oz)     BP Readings from Last 3 Encounters:   07/31/25 150/84   05/01/25 142/96   04/03/25 142/88       Health Maintenance   Topic Date Due    ZOSTER VACCINE (1 of 2) Never done    MAMMOGRAM  09/21/2024    DIABETIC FOOT EXAM  Never done    DIABETIC EYE EXAM  01/15/2025    LUNG CANCER SCREENING  05/07/2025    COLORECTAL CANCER SCREENING  10/26/2025    Hepatitis B (1 of 3 - 19+ 3-dose series) 08/09/2025 (Originally 6/8/1984)    TDAP/TD VACCINES (2 - Tdap) 01/27/2026 (Originally 5/24/2012)    COVID-19 Vaccine (2 - 2024-25 season) 04/03/2026 (Originally 9/1/2024)    INFLUENZA VACCINE  " "10/01/2025    HEMOGLOBIN A1C  01/08/2026    ANNUAL PHYSICAL  04/03/2026    URINE MICROALBUMIN-CREATININE RATIO (uACR)  04/03/2026    LIPID PANEL  07/08/2026    HEPATITIS C SCREENING  Completed    Pneumococcal Vaccine 50+  Completed       /84   Pulse 82   Temp 97.3 °F (36.3 °C)   Ht 165.1 cm (65\")   Wt 91.8 kg (202 lb 4.8 oz)   SpO2 96%   BMI 33.66 kg/m²       Current Outpatient Medications:     albuterol sulfate  (90 Base) MCG/ACT inhaler, Inhale 2 puffs Every 4 (Four) Hours As Needed for Wheezing., Disp: , Rfl:     aspirin 81 MG EC tablet, Take 1 tablet by mouth Daily. Indications: preventative, Disp: 90 tablet, Rfl: 1    busPIRone (BUSPAR) 15 MG tablet, TAKE ONE TABLET BY MOUTH THREE TIMES DAILY AS NEEDED, Disp: 90 tablet, Rfl: 1    cloNIDine (CATAPRES) 0.1 MG tablet, Take 1 tablet by mouth Daily As Needed for High Blood Pressure (BP greater than 160/90)., Disp: 30 tablet, Rfl: 0    famotidine (PEPCID) 40 MG tablet, TAKE ONE TABLET BY MOUTH EVERY NIGHT (Patient taking differently: Take 1 tablet by mouth At Night As Needed.), Disp: 30 tablet, Rfl: 3    fluticasone-salmeterol (Advair HFA) 230-21 MCG/ACT inhaler, Inhale 2 puffs 2 (Two) Times a Day. Indications: Asthma, copd, Disp: 1 each, Rfl: 2    furosemide (LASIX) 40 MG tablet, Take 1 tablet by mouth Daily. Indications: Cardiac Failure (Patient taking differently: Take 0.5 tablets by mouth Daily. Indications: Cardiac Failure), Disp: 90 tablet, Rfl: 1    gabapentin (NEURONTIN) 100 MG capsule, Every 12 (Twelve) Hours. (Patient taking differently: Take 1 capsule by mouth As Needed.), Disp: , Rfl:     glucose monitor monitoring kit, Check blood sugar twice daily., Disp: 1 each, Rfl: 0    metoprolol succinate XL (TOPROL-XL) 50 MG 24 hr tablet, Take 1 tablet by mouth Daily. Indications: Cardiac Failure, High Blood Pressure, Disp: 90 tablet, Rfl: 1    montelukast (SINGULAIR) 10 MG tablet, Take 1 tablet by mouth Every Morning. Indications: Asthma, " Perennial Allergic Rhinitis, Disp: 90 tablet, Rfl: 3    ondansetron ODT (ZOFRAN-ODT) 4 MG disintegrating tablet, Place 1 tablet on the tongue Every 8 (Eight) Hours As Needed for Nausea or Vomiting., Disp: 30 tablet, Rfl: 2    potassium chloride (KLOR-CON M20) 20 MEQ CR tablet, Take 1 tablet by mouth Daily. Indications: Low Amount of Potassium in the Blood, Disp: 90 tablet, Rfl: 1    Tirzepatide (Mounjaro) 5 MG/0.5ML solution auto-injector, Inject 5 mg under the skin into the appropriate area as directed 1 (One) Time Per Week. Indications: Type 2 Diabetes, Disp: 2 mL, Rfl: 5    ubrogepant (Ubrelvy) 100 MG tablet, Take 1 tablet by mouth 1 (One) Time As Needed (migraine). Indications: Migraine Headache, Disp: 16 tablet, Rfl: 2    venlafaxine XR (EFFEXOR-XR) 150 MG 24 hr capsule, Take 1 capsule by mouth Daily. Indications: Major Depressive Disorder, Disp: 90 capsule, Rfl: 1    venlafaxine XR (EFFEXOR-XR) 75 MG 24 hr capsule, Take 1 capsule by mouth Daily. Indications: Major Depressive Disorder, Disp: 90 capsule, Rfl: 1    Ventolin  (90 Base) MCG/ACT inhaler, INHALE 2 PUFFS BY MOUTH EVERY 4 HOURS AS NEEDED FOR WHEEZING OR SHORTNESS OF BREATH, Disp: 18 g, Rfl: 6    vitamin D3 125 MCG (5000 UT) capsule capsule, Take 1 capsule by mouth Daily. (Patient taking differently: Take 1 capsule by mouth 2 (Two) Times a Week.), Disp: , Rfl:     Evolocumab (REPATHA) solution prefilled syringe injection, Inject 1 mL under the skin into the appropriate area as directed Every 14 (Fourteen) Days. Indications: High Amount of Fats in the Blood, Disp: 2 mL, Rfl: 5    linaclotide (Linzess) 145 MCG capsule capsule, Take 1 capsule by mouth Every Morning Before Breakfast. Indications: Chronic Constipation of Unknown Cause, Disp: 30 capsule, Rfl: 5    traMADol (ULTRAM) 50 MG tablet, Take 1 tablet by mouth Every 6 (Six) Hours As Needed for Moderate Pain for up to 3 days. Indications: back pain, Disp: 12 tablet, Rfl: 0    valsartan (Diovan)  160 MG tablet, Take 1 tablet by mouth Daily. Indications: High Blood Pressure, Disp: 90 tablet, Rfl: 1    [START ON 8/28/2025] varenicline (Chantix Continuing Month Pak) 1 MG tablet, Take 1 tablet by mouth 2 (Two) Times a Day for 56 days. Indications: Treatment to Stop Smoking, Disp: 56 tablet, Rfl: 1    Varenicline Tartrate, Starter, 0.5 MG X 11 & 1 MG X 42 tablet therapy pack, Take 0.5 mg by mouth Daily for 3 days, THEN 0.5 mg 2 (Two) Times a Day for 4 days, THEN 1 mg 2 (Two) Times a Day for 21 days. Take 0.5 mg po daily x 3 days, then 0.5 mg po bid x 4 days, then 1 mg po bid  Indications: Treatment to Stop Smoking, Disp: 1 each, Rfl: 0   Past Medical History:   Diagnosis Date    Abnormal ECG     Allergic     Anxiety     Arthritis     Asthma     CHF (congestive heart failure)     Chronic diastolic CHF (congestive heart failure) 06/10/2022    Colitis     COPD (chronic obstructive pulmonary disease)     Depression     Dysphagia     Edema     Fatigue     GERD (gastroesophageal reflux disease)     Gout     History of acute pancreatitis     History of histoplasmosis     HTN (hypertension)     Hyperlipidemia     Inflammatory bowel disease     Irritable bowel syndrome     Light headed     Low back pain     Lung nodule     Migraines     Multiple joint pain     On home oxygen therapy     2 L AT NIGHT    MANI (obstructive sleep apnea)     WEARS CPAP    Pancreatitis     RLS (restless legs syndrome)     Type 2 diabetes mellitus without complication, without long-term current use of insulin 01/06/2023        Physical Exam  Vitals reviewed.   Constitutional:       Appearance: Normal appearance. She is well-developed. She is obese.   Neck:      Thyroid: No thyroid mass, thyromegaly or thyroid tenderness.   Cardiovascular:      Rate and Rhythm: Normal rate and regular rhythm.      Heart sounds: No murmur heard.     No friction rub. No gallop.   Pulmonary:      Effort: Pulmonary effort is normal.      Breath sounds: Normal breath  sounds. No wheezing or rhonchi.   Lymphadenopathy:      Cervical: No cervical adenopathy.   Skin:     General: Skin is warm and dry.   Neurological:      Mental Status: She is alert and oriented to person, place, and time.      Cranial Nerves: No cranial nerve deficit.   Psychiatric:         Mood and Affect: Mood and affect normal.         Behavior: Behavior normal.         Thought Content: Thought content normal. Thought content does not include homicidal or suicidal ideation.         Judgment: Judgment normal.       Physical Exam        Result Review :    The following data was reviewed by: ROSHNI Varma on 07/31/2025:      Results  Labs   - A1c: 07/08/2025, Normal range   - Cholesterol:   - LDL: 07/08/2025, 119   - Triglycerides: 07/08/2025, 284   - HDL: 07/08/2025, 31   - Potassium: Good      Assessment & Plan  Chronic bilateral low back pain with bilateral sciatica    Orders:    Ambulatory Referral to Pain Management    traMADol (ULTRAM) 50 MG tablet; Take 1 tablet by mouth Every 6 (Six) Hours As Needed for Moderate Pain for up to 3 days. Indications: back pain    Anxiety    Moderate episode of recurrent major depressive disorder      Primary hypertension      Orders:    valsartan (Diovan) 160 MG tablet; Take 1 tablet by mouth Daily. Indications: High Blood Pressure    Hypokalemia    Orders:    potassium chloride (KLOR-CON M20) 20 MEQ CR tablet; Take 1 tablet by mouth Daily. Indications: Low Amount of Potassium in the Blood    Type 2 diabetes mellitus with stage 3a chronic kidney disease, without long-term current use of insulin      Orders:    Tirzepatide (Mounjaro) 5 MG/0.5ML solution auto-injector; Inject 5 mg under the skin into the appropriate area as directed 1 (One) Time Per Week. Indications: Type 2 Diabetes    Neck pain    Orders:    Ambulatory Referral to Pain Management    Mixed hyperlipidemia     Chronic heart failure with preserved ejection fraction    Cigarette nicotine dependence  without complication      Orders:    Varenicline Tartrate, Starter, 0.5 MG X 11 & 1 MG X 42 tablet therapy pack; Take 0.5 mg by mouth Daily for 3 days, THEN 0.5 mg 2 (Two) Times a Day for 4 days, THEN 1 mg 2 (Two) Times a Day for 21 days. Take 0.5 mg po daily x 3 days, then 0.5 mg po bid x 4 days, then 1 mg po bid  Indications: Treatment to Stop Smoking    varenicline (Chantix Continuing Month Pak) 1 MG tablet; Take 1 tablet by mouth 2 (Two) Times a Day for 56 days. Indications: Treatment to Stop Smoking     CT Chest Low Dose Cancer Screening WO; Future    Intractable migraine with status migrainosus, unspecified migraine type      Orders:    ubrogepant (Ubrelvy) 100 MG tablet; Take 1 tablet by mouth 1 (One) Time As Needed (migraine). Indications: Migraine Headache    Chronic constipation    Orders:    linaclotide (Linzess) 145 MCG capsule capsule; Take 1 capsule by mouth Every Morning Before Breakfast. Indications: Chronic Constipation of Unknown Cause    Encounter for screening for lung cancer    Orders:     CT Chest Low Dose Cancer Screening WO; Future    Asthma-COPD overlap syndrome      Orders:    fluticasone-salmeterol (Advair HFA) 230-21 MCG/ACT inhaler; Inhale 2 puffs 2 (Two) Times a Day. Indications: Asthma, copd       Assessment & Plan  1. Back pain.  - Reports severe back pain radiating to hips and buttocks, worsening with prolonged standing or sitting.  - Given mild chronic kidney disease, NSAIDs are not recommended.  - Referral to pain management will be made for further evaluation and treatment options, including potential injections.  - Prescription for tramadol will be provided for temporary pain relief until she can see pain management. If constipation occurs due to tramadol, she should take stool softeners or MiraLAX as needed.    2. Hypertension.  - Blood pressure is not well controlled at 150/84 mmHg.  - Hydralazine caused significant dizziness and balance issues and will be added to her allergy  list.  - Losartan will be replaced with valsartan 160 mg once daily.  - She should monitor her blood pressure and report any concerns.    3. Chronic heart failure.  - History of chronic heart failure, though there is some uncertainty about the diagnosis.  - Will continue current medications, including metoprolol 50 mg.  - Follow-up appointment with cardiologist is scheduled for 11/06/2025.    4. Constipation.  - Experiences constipation, having bowel movements about once a week.  - Linzess 145 mcg will be prescribed for daily use.  - Should continue using MiraLAX or stool softeners as needed.    5. Diabetes mellitus.  - A1C is within the normal range on Mounjaro.  - Refill for Mounjaro will be sent to pharmacy.    6. Smoking cessation.  - Will restart Chantix with the starter pack regimen due to a lapse in use.  - Prescription for Chantix will be provided.    7. Asthma/COPD.  - Needs a refill for Advair until she can see her pulmonologist.  - Prescription for Advair will be provided.    8. Depression.  - Currently on Effexor 75 mg and 150 mg.  - No changes will be made to her regimen at this time.    9. Hypokalemia.  - Potassium levels are stable with supplementation.  - Refill for potassium will be provided.    10. Migraines.  - Has had a few migraines recently and uses Ubrelvy as needed.  - Refill for Ubrelvy will be provided.    11. Health maintenance.  - Due for a lung cancer screening CT scan and an eye exam.  - Overdue for a Tdap vaccine, which she plans to get at her next visit.  - Order for a mammogram will be placed, and she should reschedule it.    12. Hyperlipidemia.  - Could not tolerate statin medications. Discussed starting Repatha, but she states she never did get the prescription.  - Cholesterol levels are above goal. LDL goal should be <70, and her LDL is 119, triglycerides are 284, and HDL is low at 31.  - Prescription for Repatha will be sent for administration every 2 weeks.    Follow-up: The  patient will follow up in 3 months.          Diagnosis Plan   1. Chronic bilateral low back pain with bilateral sciatica  Ambulatory Referral to Pain Management    traMADol (ULTRAM) 50 MG tablet      2. Anxiety        3. Moderate episode of recurrent major depressive disorder        4. Primary hypertension  valsartan (Diovan) 160 MG tablet      5. Hypokalemia  potassium chloride (KLOR-CON M20) 20 MEQ CR tablet      6. Type 2 diabetes mellitus with stage 3a chronic kidney disease, without long-term current use of insulin  Tirzepatide (Mounjaro) 5 MG/0.5ML solution auto-injector      7. Neck pain  Ambulatory Referral to Pain Management      8. Mixed hyperlipidemia  Evolocumab (REPATHA) solution prefilled syringe injection      9. Chronic heart failure with preserved ejection fraction        10. Cigarette nicotine dependence without complication  Varenicline Tartrate, Starter, 0.5 MG X 11 & 1 MG X 42 tablet therapy pack    varenicline (Chantix Continuing Month Erik) 1 MG tablet     CT Chest Low Dose Cancer Screening WO      11. Intractable migraine with status migrainosus, unspecified migraine type  ubrogepant (Ubrelvy) 100 MG tablet      12. Chronic constipation  linaclotide (Linzess) 145 MCG capsule capsule      13. Encounter for screening for lung cancer   CT Chest Low Dose Cancer Screening WO      14. Asthma-COPD overlap syndrome  fluticasone-salmeterol (Advair HFA) 230-21 MCG/ACT inhaler          I spent 41 minutes caring for Dang on this date of service. This time includes time spent by me in the following activities:preparing for the visit, reviewing tests, performing a medically appropriate examination and/or evaluation , counseling and educating the patient/family/caregiver, ordering medications, tests, or procedures, referring and communicating with other health care professionals , and documenting information in the medical record  FOLLOW UP  Return in about 3 months (around 10/31/2025) for 30 min apt for  complex pt and dm foot exam.  Patient was given instructions and counseling regarding her condition or for health maintenance advice. Please see specific information pulled into the AVS if appropriate.       CURRENT & DISCONTINUED MEDICATIONS  Current Outpatient Medications   Medication Instructions    albuterol sulfate  (90 Base) MCG/ACT inhaler 2 puffs, Every 4 Hours PRN    aspirin 81 mg, Oral, Daily    busPIRone (BUSPAR) 15 MG tablet TAKE ONE TABLET BY MOUTH THREE TIMES DAILY AS NEEDED    cloNIDine (CATAPRES) 0.1 mg, Oral, Daily PRN    Evolocumab (REPATHA) 140 mg, Subcutaneous, Every 14 Days    famotidine (PEPCID) 40 mg, Oral, Every Night at Bedtime    fluticasone-salmeterol (Advair HFA) 230-21 MCG/ACT inhaler 2 puffs, Inhalation, 2 Times Daily - RT    furosemide (LASIX) 40 mg, Oral, Daily    gabapentin (NEURONTIN) 100 MG capsule Every 12 Hours Scheduled    glucose monitor monitoring kit Check blood sugar twice daily.    linaclotide (LINZESS) 145 mcg, Oral, Every Morning Before Breakfast    metoprolol succinate XL (TOPROL-XL) 50 mg, Oral, Daily    montelukast (SINGULAIR) 10 mg, Oral, Every Morning    Mounjaro 5 mg, Subcutaneous, Weekly    ondansetron ODT (ZOFRAN-ODT) 4 mg, Translingual, Every 8 Hours PRN    potassium chloride (KLOR-CON M20) 20 MEQ CR tablet 20 mEq, Oral, Daily    traMADol (ULTRAM) 50 mg, Oral, Every 6 Hours PRN    ubrogepant (UBRELVY) 100 mg, Oral, Once As Needed    valsartan (DIOVAN) 160 mg, Oral, Daily    [START ON 8/28/2025] varenicline (CHANTIX CONTINUING MONTH TEZ) 1 mg, Oral, 2 Times Daily    Varenicline Tartrate, Starter, 0.5 MG X 11 & 1 MG X 42 tablet therapy pack Take 0.5 mg by mouth Daily for 3 days, THEN 0.5 mg 2 (Two) Times a Day for 4 days, THEN 1 mg 2 (Two) Times a Day for 21 days. Take 0.5 mg po daily x 3 days, then 0.5 mg po bid x 4 days, then 1 mg po bid  Indications: Treatment to Stop Smoking    venlafaxine XR (EFFEXOR-XR) 150 mg, Oral, Daily    venlafaxine XR  (EFFEXOR-XR) 75 mg, Oral, Daily    Ventolin  (90 Base) MCG/ACT inhaler 2 puffs, Every 4 Hours PRN    vitamin D3 5,000 Units, Daily       Medications Discontinued During This Encounter   Medication Reason    hydrALAZINE (APRESOLINE) 25 MG tablet Side effects    losartan (COZAAR) 50 MG tablet Not Efficacious    potassium chloride (KLOR-CON M20) 20 MEQ CR tablet Reorder    Mounjaro 5 MG/0.5ML solution auto-injector Reorder    ubrogepant 100 MG tablet Reorder    fluticasone-salmeterol (Advair HFA) 230-21 MCG/ACT inhaler Reorder        Parts of this note are electronic transcriptions/translations of spoken language to printed text using the Dragon Dictation system.    Maria De Jesus Narayanan, ROSHNI  07/31/25  13:18 EDT

## 2025-07-31 ENCOUNTER — OFFICE VISIT (OUTPATIENT)
Dept: FAMILY MEDICINE CLINIC | Facility: CLINIC | Age: 60
End: 2025-07-31
Payer: COMMERCIAL

## 2025-07-31 VITALS
WEIGHT: 202.3 LBS | TEMPERATURE: 97.3 F | HEIGHT: 65 IN | OXYGEN SATURATION: 96 % | HEART RATE: 82 BPM | DIASTOLIC BLOOD PRESSURE: 84 MMHG | SYSTOLIC BLOOD PRESSURE: 150 MMHG | BODY MASS INDEX: 33.7 KG/M2

## 2025-07-31 DIAGNOSIS — G43.911 INTRACTABLE MIGRAINE WITH STATUS MIGRAINOSUS, UNSPECIFIED MIGRAINE TYPE: ICD-10-CM

## 2025-07-31 DIAGNOSIS — I10 PRIMARY HYPERTENSION: ICD-10-CM

## 2025-07-31 DIAGNOSIS — J44.89 ASTHMA-COPD OVERLAP SYNDROME: ICD-10-CM

## 2025-07-31 DIAGNOSIS — M54.2 NECK PAIN: ICD-10-CM

## 2025-07-31 DIAGNOSIS — E87.6 HYPOKALEMIA: ICD-10-CM

## 2025-07-31 DIAGNOSIS — K59.09 CHRONIC CONSTIPATION: ICD-10-CM

## 2025-07-31 DIAGNOSIS — I50.32 CHRONIC HEART FAILURE WITH PRESERVED EJECTION FRACTION: ICD-10-CM

## 2025-07-31 DIAGNOSIS — E11.22 TYPE 2 DIABETES MELLITUS WITH STAGE 3A CHRONIC KIDNEY DISEASE, WITHOUT LONG-TERM CURRENT USE OF INSULIN: ICD-10-CM

## 2025-07-31 DIAGNOSIS — M54.41 CHRONIC BILATERAL LOW BACK PAIN WITH BILATERAL SCIATICA: Primary | ICD-10-CM

## 2025-07-31 DIAGNOSIS — F41.9 ANXIETY: ICD-10-CM

## 2025-07-31 DIAGNOSIS — F33.1 MODERATE EPISODE OF RECURRENT MAJOR DEPRESSIVE DISORDER: ICD-10-CM

## 2025-07-31 DIAGNOSIS — E78.2 MIXED HYPERLIPIDEMIA: ICD-10-CM

## 2025-07-31 DIAGNOSIS — N18.31 TYPE 2 DIABETES MELLITUS WITH STAGE 3A CHRONIC KIDNEY DISEASE, WITHOUT LONG-TERM CURRENT USE OF INSULIN: ICD-10-CM

## 2025-07-31 DIAGNOSIS — G89.29 CHRONIC BILATERAL LOW BACK PAIN WITH BILATERAL SCIATICA: Primary | ICD-10-CM

## 2025-07-31 DIAGNOSIS — Z12.2 ENCOUNTER FOR SCREENING FOR LUNG CANCER: ICD-10-CM

## 2025-07-31 DIAGNOSIS — F17.210 CIGARETTE NICOTINE DEPENDENCE WITHOUT COMPLICATION: ICD-10-CM

## 2025-07-31 DIAGNOSIS — M54.42 CHRONIC BILATERAL LOW BACK PAIN WITH BILATERAL SCIATICA: Primary | ICD-10-CM

## 2025-07-31 PROBLEM — B37.41 YEAST CYSTITIS: Status: RESOLVED | Noted: 2023-01-06 | Resolved: 2025-07-31

## 2025-07-31 RX ORDER — VARENICLINE TARTRATE 0.5 (11)-1
KIT ORAL
Qty: 1 EACH | Refills: 0 | Status: SHIPPED | OUTPATIENT
Start: 2025-07-31 | End: 2025-08-28

## 2025-07-31 RX ORDER — VARENICLINE TARTRATE 1 MG/1
1 TABLET, FILM COATED ORAL 2 TIMES DAILY
Qty: 56 TABLET | Refills: 1 | Status: SHIPPED | OUTPATIENT
Start: 2025-08-28 | End: 2025-10-23

## 2025-07-31 RX ORDER — TRAMADOL HYDROCHLORIDE 50 MG/1
50 TABLET ORAL EVERY 6 HOURS PRN
Qty: 12 TABLET | Refills: 0 | Status: SHIPPED | OUTPATIENT
Start: 2025-07-31 | End: 2025-08-03

## 2025-07-31 RX ORDER — POTASSIUM CHLORIDE 1500 MG/1
20 TABLET, EXTENDED RELEASE ORAL DAILY
Qty: 90 TABLET | Refills: 1 | Status: SHIPPED | OUTPATIENT
Start: 2025-07-31

## 2025-07-31 RX ORDER — VALSARTAN 160 MG/1
160 TABLET ORAL DAILY
Qty: 90 TABLET | Refills: 1 | Status: SHIPPED | OUTPATIENT
Start: 2025-07-31

## 2025-07-31 RX ORDER — FLUTICASONE PROPIONATE AND SALMETEROL XINAFOATE 230; 21 UG/1; UG/1
2 AEROSOL, METERED RESPIRATORY (INHALATION)
Qty: 1 EACH | Refills: 2 | Status: SHIPPED | OUTPATIENT
Start: 2025-07-31

## 2025-07-31 RX ORDER — TIRZEPATIDE 5 MG/.5ML
5 INJECTION, SOLUTION SUBCUTANEOUS WEEKLY
Qty: 2 ML | Refills: 5 | Status: SHIPPED | OUTPATIENT
Start: 2025-07-31

## 2025-07-31 RX ORDER — LOSARTAN POTASSIUM 50 MG/1
50 TABLET ORAL 2 TIMES DAILY
Qty: 180 TABLET | Refills: 0 | Status: CANCELLED | OUTPATIENT
Start: 2025-07-31

## 2025-07-31 NOTE — ASSESSMENT & PLAN NOTE
{Hypertension is (optional):7212594261}    Orders:    valsartan (Diovan) 160 MG tablet; Take 1 tablet by mouth Daily. Indications: High Blood Pressure

## 2025-07-31 NOTE — ASSESSMENT & PLAN NOTE
Orders:    potassium chloride (KLOR-CON M20) 20 MEQ CR tablet; Take 1 tablet by mouth Daily. Indications: Low Amount of Potassium in the Blood

## 2025-07-31 NOTE — ASSESSMENT & PLAN NOTE
{General Headache A/P Smartblock (Optional):4429730858}    Orders:    ubrogepant (Ubrelvy) 100 MG tablet; Take 1 tablet by mouth 1 (One) Time As Needed (migraine). Indications: Migraine Headache

## 2025-07-31 NOTE — ASSESSMENT & PLAN NOTE
Orders:    Ambulatory Referral to Pain Management    traMADol (ULTRAM) 50 MG tablet; Take 1 tablet by mouth Every 6 (Six) Hours As Needed for Moderate Pain for up to 3 days. Indications: back pain     [facility]  [facility] [place]      Pediatric Call Center [phone], option 1  Inez Collier RN Care Coordinator:  [phone]    After Hours Emergency:  [phone].  Ask for the on-call doctor for the specialty you are calling for be paged.    Prescription Renewals:  Your pharmacy must fax requests to [phone].  Please allow 2-3 days for prescriptions to be authorized.    If your physician has ordered an x-ray or MRI, you may schedule this test by calling [facility] in [place] at [phone].

## 2025-07-31 NOTE — ASSESSMENT & PLAN NOTE
Orders:    linaclotide (Linzess) 145 MCG capsule capsule; Take 1 capsule by mouth Every Morning Before Breakfast. Indications: Chronic Constipation of Unknown Cause

## 2025-07-31 NOTE — ASSESSMENT & PLAN NOTE
{Asthma A/P Block (Optional):86177}    Orders:    fluticasone-salmeterol (Advair HFA) 230-21 MCG/ACT inhaler; Inhale 2 puffs 2 (Two) Times a Day. Indications: Asthma, copd

## 2025-07-31 NOTE — ASSESSMENT & PLAN NOTE
Orders:    Varenicline Tartrate, Starter, 0.5 MG X 11 & 1 MG X 42 tablet therapy pack; Take 0.5 mg by mouth Daily for 3 days, THEN 0.5 mg 2 (Two) Times a Day for 4 days, THEN 1 mg 2 (Two) Times a Day for 21 days. Take 0.5 mg po daily x 3 days, then 0.5 mg po bid x 4 days, then 1 mg po bid  Indications: Treatment to Stop Smoking    varenicline (Chantix Continuing Month Erik) 1 MG tablet; Take 1 tablet by mouth 2 (Two) Times a Day for 56 days. Indications: Treatment to Stop Smoking     CT Chest Low Dose Cancer Screening WO; Future

## 2025-07-31 NOTE — ASSESSMENT & PLAN NOTE
{Diabetes (Optional):[id number]}    Orders:    Tirzepatide (Mounjaro) 5 MG/0.5ML solution auto-injector; Inject 5 mg under the skin into the appropriate area as directed 1 (One) Time Per Week. Indications: Type 2 Diabetes

## 2025-08-07 RX ORDER — LOSARTAN POTASSIUM 50 MG/1
50 TABLET ORAL EVERY 12 HOURS SCHEDULED
Qty: 180 TABLET | OUTPATIENT
Start: 2025-08-07

## 2025-08-13 ENCOUNTER — HOSPITAL ENCOUNTER (OUTPATIENT)
Dept: CT IMAGING | Facility: HOSPITAL | Age: 60
Discharge: HOME | End: 2025-08-13
Admitting: NURSE PRACTITIONER
Payer: COMMERCIAL

## 2025-08-13 DIAGNOSIS — F17.210 CIGARETTE NICOTINE DEPENDENCE WITHOUT COMPLICATION: ICD-10-CM

## 2025-08-13 DIAGNOSIS — Z12.2 ENCOUNTER FOR SCREENING FOR LUNG CANCER: ICD-10-CM

## 2025-08-13 PROCEDURE — 71271 CT THORAX LUNG CANCER SCR C-: CPT

## 2025-08-19 DIAGNOSIS — E78.2 MIXED HYPERLIPIDEMIA: Primary | ICD-10-CM

## 2025-08-19 RX ORDER — PRAVASTATIN SODIUM 20 MG
20 TABLET ORAL DAILY
Qty: 30 TABLET | Refills: 2 | Status: SHIPPED | OUTPATIENT
Start: 2025-08-19

## 2025-08-22 RX ORDER — HYDRALAZINE HYDROCHLORIDE 25 MG/1
25 TABLET, FILM COATED ORAL EVERY 12 HOURS SCHEDULED
Qty: 60 TABLET | OUTPATIENT
Start: 2025-08-22

## (undated) DEVICE — GLV SURG SENSICARE GREEN W/ALOE PF LF 8.5 STRL

## (undated) DEVICE — ENSEAL LAPAROSCOPIC TISSUE SEALER G2 ARTICULATING CURVED JAW FOR USE WITH G2 GENERATOR 5MM DIAMETER 45CM SHAFT LENGTH: Brand: ENSEAL

## (undated) DEVICE — PK OSC LAP SLV 40

## (undated) DEVICE — GLV SURG SENSICARE GREEN W/ALOE PF LF 6 STRL

## (undated) DEVICE — ADHS SKIN DERMABOND TOP ADVANCED

## (undated) DEVICE — GLV SURG SENSICARE MICRO PF LF 8.5 STRL

## (undated) DEVICE — GLV SURG SENSICARE MICRO PF LF 6 STRL

## (undated) DEVICE — APL DUPLOSPRAYER MIS 40CM

## (undated) DEVICE — VISIGI 3D®  CALIBRATION SYSTEM  SIZE 36FR STD W/ BULB: Brand: BOEHRINGER® VISIGI 3D™ SLEEVE GASTRECTOMY CALIBRATION SYSTEM, SIZE 36FR W/BULB

## (undated) DEVICE — JELLY,LUBE,STERILE,FLIP TOP,TUBE,4-OZ: Brand: MEDLINE

## (undated) DEVICE — VIOLET BRAIDED (POLYGLACTIN 910), SYNTHETIC ABSORBABLE SUTURE: Brand: COATED VICRYL